# Patient Record
Sex: MALE | Race: BLACK OR AFRICAN AMERICAN | Employment: UNEMPLOYED | URBAN - METROPOLITAN AREA
[De-identification: names, ages, dates, MRNs, and addresses within clinical notes are randomized per-mention and may not be internally consistent; named-entity substitution may affect disease eponyms.]

---

## 2017-05-06 PROBLEM — S12.500A CLOSED DISPLACED FRACTURE OF SIXTH CERVICAL VERTEBRA (HCC): Status: ACTIVE | Noted: 2017-05-06

## 2017-05-06 PROBLEM — S12.301A CLOSED NONDISPLACED FRACTURE OF FOURTH CERVICAL VERTEBRA (HCC): Status: ACTIVE | Noted: 2017-05-06

## 2017-05-06 PROBLEM — S20.311A ABRASION OF RIGHT CHEST WALL: Status: ACTIVE | Noted: 2017-05-06

## 2017-05-06 PROBLEM — S27.322A CONTUSION OF LUNG, BILATERAL, INITIAL ENCOUNTER: Status: ACTIVE | Noted: 2017-05-06

## 2017-05-06 PROBLEM — S12.9XXA CERVICAL SPINE FRACTURE (HCC): Status: ACTIVE | Noted: 2017-05-06

## 2017-05-06 PROBLEM — S12.401A CLOSED NONDISPLACED FRACTURE OF FIFTH CERVICAL VERTEBRA (HCC): Status: ACTIVE | Noted: 2017-05-06

## 2017-05-06 PROBLEM — S12.101A CLOSED NONDISPLACED FRACTURE OF SECOND CERVICAL VERTEBRA (HCC): Status: ACTIVE | Noted: 2017-05-06

## 2017-05-06 PROBLEM — S22.41XA CLOSED FRACTURE OF MULTIPLE RIBS OF RIGHT SIDE: Status: ACTIVE | Noted: 2017-05-06

## 2017-05-06 PROBLEM — S00.81XA FOREHEAD ABRASION: Status: ACTIVE | Noted: 2017-05-06

## 2017-05-06 PROBLEM — I77.74 VERTEBRAL ARTERY DISSECTION (HCC): Status: ACTIVE | Noted: 2017-05-06

## 2017-05-06 PROBLEM — S70.211A ABRASION OF RIGHT HIP: Status: ACTIVE | Noted: 2017-05-06

## 2017-05-06 PROBLEM — S10.93XA HEMATOMA OF NECK: Status: ACTIVE | Noted: 2017-05-06

## 2017-05-06 PROBLEM — F19.90 ILLICIT DRUG USE: Status: ACTIVE | Noted: 2017-05-06
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
## 2017-05-06 PROBLEM — S12.201A CLOSED NONDISPLACED FRACTURE OF THIRD CERVICAL VERTEBRA (HCC): Status: ACTIVE | Noted: 2017-05-06

## 2017-05-06 PROBLEM — S12.030A: Status: ACTIVE | Noted: 2017-05-06

## 2017-05-06 PROBLEM — S80.211A ABRASION OF RIGHT KNEE: Status: ACTIVE | Noted: 2017-05-06

## 2017-05-08 PROBLEM — S06.2XAA DIFFUSE AXONAL BRAIN INJURY (HCC): Status: ACTIVE | Noted: 2017-05-08

## 2017-05-08 PROBLEM — G54.0 BRACHIAL PLEXOPATHY: Status: ACTIVE | Noted: 2017-05-08

## 2017-05-08 PROBLEM — G83.21 PARALYSIS OF RIGHT UPPER EXTREMITY (HCC): Status: ACTIVE | Noted: 2017-05-08

## 2017-05-08 PROBLEM — G83.11 PARALYSIS OF RIGHT LOWER EXTREMITY (HCC): Status: ACTIVE | Noted: 2017-05-08

## 2017-05-11 PROBLEM — I60.9 SAH (SUBARACHNOID HEMORRHAGE) (HCC): Status: ACTIVE | Noted: 2017-05-11

## 2017-05-11 PROBLEM — S06.4XAA EPIDURAL HEMATOMA (HCC): Status: ACTIVE | Noted: 2017-05-11

## 2017-05-11 PROBLEM — S14.109A CONTUSION OF CERVICAL CORD (HCC): Status: ACTIVE | Noted: 2017-05-11

## 2017-05-11 PROBLEM — S14.2XXA CERVICAL NERVE ROOT INJURY: Status: ACTIVE | Noted: 2017-05-11

## 2017-05-11 PROBLEM — T14.8XXA MUSCLE CONTUSION: Status: ACTIVE | Noted: 2017-05-11

## 2017-06-29 PROBLEM — S27.322A CONTUSION OF LUNG, BILATERAL, INITIAL ENCOUNTER: Status: RESOLVED | Noted: 2017-05-06 | Resolved: 2017-06-29

## 2017-06-29 PROBLEM — S12.201A CLOSED NONDISPLACED FRACTURE OF THIRD CERVICAL VERTEBRA (HCC): Status: RESOLVED | Noted: 2017-05-06 | Resolved: 2017-06-29

## 2017-06-29 PROBLEM — S10.93XA HEMATOMA OF NECK: Status: RESOLVED | Noted: 2017-05-06 | Resolved: 2017-06-29

## 2017-06-29 PROBLEM — S12.301A CLOSED NONDISPLACED FRACTURE OF FOURTH CERVICAL VERTEBRA (HCC): Status: RESOLVED | Noted: 2017-05-06 | Resolved: 2017-06-29

## 2017-06-29 PROBLEM — S22.41XA CLOSED FRACTURE OF MULTIPLE RIBS OF RIGHT SIDE: Status: RESOLVED | Noted: 2017-05-06 | Resolved: 2017-06-29

## 2017-06-29 PROBLEM — S12.500A CLOSED DISPLACED FRACTURE OF SIXTH CERVICAL VERTEBRA (HCC): Status: RESOLVED | Noted: 2017-05-06 | Resolved: 2017-06-29

## 2017-06-29 PROBLEM — S12.401A CLOSED NONDISPLACED FRACTURE OF FIFTH CERVICAL VERTEBRA (HCC): Status: RESOLVED | Noted: 2017-05-06 | Resolved: 2017-06-29

## 2017-07-12 ENCOUNTER — TELEPHONE (OUTPATIENT)
Dept: FAMILY MEDICINE CLINIC | Age: 37
End: 2017-07-12

## 2017-07-17 ENCOUNTER — OFFICE VISIT (OUTPATIENT)
Dept: FAMILY MEDICINE CLINIC | Age: 37
End: 2017-07-17
Payer: COMMERCIAL

## 2017-07-17 ENCOUNTER — TELEPHONE (OUTPATIENT)
Dept: FAMILY MEDICINE CLINIC | Age: 37
End: 2017-07-17

## 2017-07-17 VITALS
TEMPERATURE: 99 F | DIASTOLIC BLOOD PRESSURE: 64 MMHG | HEART RATE: 98 BPM | BODY MASS INDEX: 25.05 KG/M2 | HEIGHT: 70 IN | SYSTOLIC BLOOD PRESSURE: 118 MMHG | OXYGEN SATURATION: 98 % | WEIGHT: 175 LBS

## 2017-07-17 DIAGNOSIS — Z23 NEED FOR PROPHYLACTIC VACCINATION AGAINST STREPTOCOCCUS PNEUMONIAE (PNEUMOCOCCUS): ICD-10-CM

## 2017-07-17 DIAGNOSIS — S12.030D CLOSED DISPLACED FRACTURE OF POSTERIOR ARCH OF FIRST CERVICAL VERTEBRA WITH ROUTINE HEALING, SUBSEQUENT ENCOUNTER: ICD-10-CM

## 2017-07-17 DIAGNOSIS — Z23 NEED FOR PROPHYLACTIC VACCINATION AGAINST DIPHTHERIA-TETANUS-PERTUSSIS (DTP): ICD-10-CM

## 2017-07-17 DIAGNOSIS — I77.74 VERTEBRAL ARTERY DISSECTION (HCC): ICD-10-CM

## 2017-07-17 DIAGNOSIS — S12.101D CLOSED NONDISPLACED FRACTURE OF SECOND CERVICAL VERTEBRA WITH ROUTINE HEALING, UNSPECIFIED FRACTURE MORPHOLOGY, SUBSEQUENT ENCOUNTER: ICD-10-CM

## 2017-07-17 DIAGNOSIS — Z11.4 SCREENING FOR HIV WITHOUT PRESENCE OF RISK FACTORS: ICD-10-CM

## 2017-07-17 DIAGNOSIS — S06.2X1D: ICD-10-CM
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
## 2017-07-17 PROCEDURE — 99495 TRANSJ CARE MGMT MOD F2F 14D: CPT | Performed by: FAMILY MEDICINE

## 2017-07-17 RX ORDER — OXYCODONE HCL 10 MG/1
10 TABLET, FILM COATED, EXTENDED RELEASE ORAL EVERY 12 HOURS
Qty: 28 TABLET | Refills: 0 | Status: CANCELLED | OUTPATIENT
Start: 2017-07-17 | End: 2017-07-31

## 2017-07-17 RX ORDER — OXYCODONE HYDROCHLORIDE 10 MG/1
10 TABLET ORAL EVERY 12 HOURS PRN
Refills: 0 | Status: CANCELLED | OUTPATIENT
Start: 2017-07-17 | End: 2017-07-24

## 2017-07-17 RX ORDER — OXYCODONE HYDROCHLORIDE 10 MG/1
10 TABLET ORAL EVERY 12 HOURS PRN
Qty: 7 TABLET | Refills: 0 | Status: CANCELLED | OUTPATIENT
Start: 2017-07-17 | End: 2017-07-24

## 2017-07-17 ASSESSMENT — PATIENT HEALTH QUESTIONNAIRE - PHQ9
SUM OF ALL RESPONSES TO PHQ QUESTIONS 1-9: 1
1. LITTLE INTEREST OR PLEASURE IN DOING THINGS: 1
SUM OF ALL RESPONSES TO PHQ9 QUESTIONS 1 & 2: 1
2. FEELING DOWN, DEPRESSED OR HOPELESS: 0

## 2017-07-28 ENCOUNTER — TELEPHONE (OUTPATIENT)
Dept: FAMILY MEDICINE CLINIC | Age: 37
End: 2017-07-28
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
## 2017-08-25 ENCOUNTER — HOSPITAL ENCOUNTER (OUTPATIENT)
Dept: CT IMAGING | Age: 37
Discharge: HOME | End: 2017-08-25
Payer: COMMERCIAL

## 2017-08-25 DIAGNOSIS — S12.030S: ICD-10-CM

## 2017-08-25 DIAGNOSIS — S12.101S CLOSED NONDISPLACED FRACTURE OF SECOND CERVICAL VERTEBRA, UNSPECIFIED FRACTURE MORPHOLOGY, SEQUELA: ICD-10-CM

## 2017-08-25 PROCEDURE — 72125 CT NECK SPINE W/O DYE: CPT

## 2017-09-13 ENCOUNTER — OFFICE VISIT (OUTPATIENT)
Dept: FAMILY MEDICINE CLINIC | Age: 37
End: 2017-09-13
Payer: COMMERCIAL

## 2017-09-13 VITALS
SYSTOLIC BLOOD PRESSURE: 124 MMHG | HEIGHT: 66 IN | BODY MASS INDEX: 26.03 KG/M2 | WEIGHT: 162 LBS | DIASTOLIC BLOOD PRESSURE: 80 MMHG | OXYGEN SATURATION: 96 % | HEART RATE: 93 BPM

## 2017-09-13 DIAGNOSIS — Z00.00 ENCOUNTER FOR WELLNESS EXAMINATION: Primary | ICD-10-CM

## 2017-09-13 PROCEDURE — 90732 PPSV23 VACC 2 YRS+ SUBQ/IM: CPT | Performed by: FAMILY MEDICINE

## 2017-09-13 PROCEDURE — 99395 PREV VISIT EST AGE 18-39: CPT | Performed by: FAMILY MEDICINE

## 2017-09-13 PROCEDURE — 90688 IIV4 VACCINE SPLT 0.5 ML IM: CPT | Performed by: FAMILY MEDICINE

## 2017-09-13 PROCEDURE — 90471 IMMUNIZATION ADMIN: CPT | Performed by: FAMILY MEDICINE

## 2017-09-13 ASSESSMENT — ENCOUNTER SYMPTOMS
CHEST TIGHTNESS: 0
ABDOMINAL DISTENTION: 0
DIARRHEA: 0
ANAL BLEEDING: 0
APNEA: 0
COLOR CHANGE: 0
RECTAL PAIN: 0
ABDOMINAL PAIN: 0
SORE THROAT: 0
SINUS PRESSURE: 0
BACK PAIN: 0
CHOKING: 0
STRIDOR: 0
NAUSEA: 0
EYE DISCHARGE: 0
EYE ITCHING: 0
EYE PAIN: 0
CONSTIPATION: 0
COUGH: 0
RHINORRHEA: 0
WHEEZING: 0
BLOOD IN STOOL: 0
PHOTOPHOBIA: 0
VOMITING: 0
EYE REDNESS: 0
TROUBLE SWALLOWING: 0
FACIAL SWELLING: 0
VOICE CHANGE: 0
SHORTNESS OF BREATH: 0

## 2017-09-14 ENCOUNTER — TELEPHONE (OUTPATIENT)
Dept: FAMILY MEDICINE CLINIC | Age: 37
End: 2017-09-14

## 2017-11-21 ENCOUNTER — TELEPHONE (OUTPATIENT)
Dept: FAMILY MEDICINE CLINIC | Age: 37
End: 2017-11-21

## 2017-11-21 NOTE — TELEPHONE ENCOUNTER
Please can you find out who requested that test.  I know my name is in the order, but I did not requested that.

## 2017-11-21 NOTE — TELEPHONE ENCOUNTER
CALLED AND SPOKE WITH PATIENT. PATIENT STATES THAT  DID A NERVE TEST ON HIM AND SENT HIM TO [facility]. PATIENT STATES THAT HE WAS GOING TO HAVE TO WAIT SEVERAL HOURS TO GET TEST PERFORMED THERE SO HE CALLED [facility] TO SCHEDULE TEST. TEST WAS NEVER ORDERED BY DR. Kem Serrato, IT WAS ORDERED BY [facility]. PATIENT STATES HE DID RECEIVE ON ORDER FROM THEM. PATIENT SCHEDULED TEST HIMSELF BY CALLING [facility]. ADVISED TONO OF INFORMATION.

## 2017-11-21 NOTE — TELEPHONE ENCOUNTER
Spoke to [employer] at [facility] regarding order entered. Informed her of above information.  She verbalized understanding and said she would forward to appropriate team.

## 2017-11-21 NOTE — TELEPHONE ENCOUNTER
Central scheduling at [facility] called regarding needing an order for a MRI Brachial Plexus Right WO Contrast. Order is currently found under the Referral tab however is not the correct order. Please cancel and put in new order. Testing will need to be prior auth before appointment.

## 2017-12-13 ENCOUNTER — OFFICE VISIT (OUTPATIENT)
Dept: FAMILY MEDICINE CLINIC | Age: 37
End: 2017-12-13
Payer: COMMERCIAL

## 2017-12-13 VITALS
SYSTOLIC BLOOD PRESSURE: 116 MMHG | HEART RATE: 81 BPM | OXYGEN SATURATION: 97 % | HEIGHT: 66 IN | RESPIRATION RATE: 12 BRPM | DIASTOLIC BLOOD PRESSURE: 76 MMHG | BODY MASS INDEX: 26.52 KG/M2 | WEIGHT: 165 LBS | TEMPERATURE: 98.5 F

## 2017-12-13 DIAGNOSIS — S14.3XXS INJURY OF BRACHIAL PLEXUS, SEQUELA: Primary | ICD-10-CM

## 2017-12-13 DIAGNOSIS — Z23 NEED FOR PROPHYLACTIC VACCINATION AGAINST DIPHTHERIA-TETANUS-PERTUSSIS (DTP): ICD-10-CM

## 2017-12-13 PROCEDURE — G8417 CALC BMI ABV UP PARAM F/U: HCPCS | Performed by: FAMILY MEDICINE

## 2017-12-13 PROCEDURE — 90471 IMMUNIZATION ADMIN: CPT | Performed by: FAMILY MEDICINE

## 2017-12-13 PROCEDURE — G8484 FLU IMMUNIZE NO ADMIN: HCPCS | Performed by: FAMILY MEDICINE

## 2017-12-13 PROCEDURE — 4004F PT TOBACCO SCREEN RCVD TLK: CPT | Performed by: FAMILY MEDICINE

## 2017-12-13 PROCEDURE — 90715 TDAP VACCINE 7 YRS/> IM: CPT | Performed by: FAMILY MEDICINE

## 2017-12-13 PROCEDURE — 99212 OFFICE O/P EST SF 10 MIN: CPT | Performed by: FAMILY MEDICINE

## 2017-12-13 PROCEDURE — G8427 DOCREV CUR MEDS BY ELIG CLIN: HCPCS | Performed by: FAMILY MEDICINE

## 2017-12-13 RX ORDER — BACLOFEN 10 MG/1
10 TABLET ORAL 3 TIMES DAILY
Qty: 90 TABLET | Refills: 0 | Status: CANCELLED | OUTPATIENT
Start: 2017-12-13

## 2017-12-13 RX ORDER — HYDROCODONE BITARTRATE AND ACETAMINOPHEN 5; 325 MG/1; MG/1
1 TABLET ORAL EVERY 6 HOURS PRN
COMMUNITY
End: 2018-06-13 | Stop reason: DRUGHIGH

## 2017-12-13 NOTE — PATIENT INSTRUCTIONS
You may receive a survey about your visit with us today. The feedback from our patients helps us identify what is working well and where the service to all patients can be enhanced. Thank you! Patient Education        Tdap (Tetanus, Diphtheria, Pertussis) Vaccine: What You Need to Know  Why get vaccinated? Tetanus, diphtheria, and pertussis are very serious diseases. Tdap vaccine can protect us from these diseases. And Tdap vaccine given to pregnant women can protect  babies against pertussis. Tetanus (lockjaw) is rare in the [facility] today. It causes painful muscle tightening and stiffness, usually all over the body. · It can lead to tightening of muscles in the head and neck so you can't open your mouth, swallow, or sometimes even breathe. Tetanus kills about 1 out of 10 people who are infected even after receiving the best medical care. Diphtheria is also rare in the [place] today. It can cause a thick coating to form in the back of the throat. · It can lead to breathing problems, heart failure, paralysis, and death. Pertussis (whooping cough) causes severe coughing spells, which can cause difficulty breathing, vomiting, and disturbed sleep. · It can also lead to weight loss, incontinence, and rib fractures. Up to 2 in 100 adolescents and 5 in 100 adults with pertussis are hospitalized or have complications, which could include pneumonia or death. These diseases are caused by bacteria. Diphtheria and pertussis are spread from person to person through secretions from coughing or sneezing. Tetanus enters the body through cuts, scratches, or wounds. Before vaccines, as many as 200,000 cases of diphtheria, 200,000 cases of pertussis, and hundreds of cases of tetanus were reported in the [place] each year. Since vaccination began, reports of cases for tetanus and diphtheria have dropped by about 99% and for pertussis by about 80%.   Tdap vaccine  The Tdap vaccine can protect adolescents and adults from tetanus, diphtheria, and pertussis. One dose of Tdap is routinely given at age 6 or 15. People who did not get Tdap at that age should get it as soon as possible. Tdap is especially important for health care professionals and anyone having close contact with a baby younger than 12 months. Pregnant women should get a dose of Tdap during every pregnancy, to protect the  from pertussis. Infants are most at risk for severe, life-threatening complications from pertussis. Another vaccine, called Td, protects against tetanus and diphtheria, but not pertussis. A Td booster should be given every 10 years. Tdap may be given as one of these boosters if you have never gotten Tdap before. Tdap may also be given after a severe cut or burn to prevent tetanus infection. Your doctor or the person giving you the vaccine can give you more information. Tdap may safely be given at the same time as other vaccines. Some people should not get this vaccine  · A person who has ever had a life-threatening allergic reaction after a previous dose of any diphtheria-, tetanus-, or pertussis-containing vaccine, OR has a severe allergy to any part of this vaccine, should not get Tdap vaccine. Tell the person giving the vaccine about any severe allergies. · Anyone who had coma or long repeated seizures within 7 days after a childhood dose of DTP or DTaP, or a previous dose of Tdap, should not get Tdap, unless a cause other than the vaccine was found. They can still get Td. · Talk to your doctor if you:  ¨ Have seizures or another nervous system problem. ¨ Had severe pain or swelling after any vaccine containing diphtheria, tetanus, or pertussis. ¨ Ever had a condition called Guillain-Barré Syndrome (GBS). ¨ Aren't feeling well on the day the shot is scheduled. Risks  With any medicine, including vaccines, there is a chance of side effects. These are usually mild and go away on their own.  Serious very rare, estimated at fewer than 1 in a million doses, and would happen within a few minutes to a few hours after the vaccination. As with any medicine, there is a very remote chance of a vaccine causing a serious injury or death. The safety of vaccines is always being monitored. For more information, visit: www.cdc.gov/vaccinesafety. What if there is a serious problem? What should I look for? · Look for anything that concerns you, such as signs of a severe allergic reaction, very high fever, or unusual behavior. Signs of a severe allergic reaction can include hives, swelling of the face and throat, difficulty breathing, a fast heartbeat, dizziness, and weakness. These would usually start a few minutes to a few hours after the vaccination. What should I do? · If you think it is a severe allergic reaction or other emergency that can't wait, call 9-1-1 or get the person to the nearest hospital. Otherwise, call your doctor. · Afterward, the reaction should be reported to the Vaccine Adverse Event Reporting System (VAERS). Your doctor might file this report, or you can do it yourself through the VAERS web site at www.vaers. [facility].gov, or by calling 4-903.892.1353. [employer] does not give medical advice. The National Vaccine Injury Compensation Program  The National Vaccine Injury Compensation Program (VICP) is a federal program that was created to compensate people who may have been injured by certain vaccines. Persons who believe they may have been injured by a vaccine can learn about the program and about filing a claim by calling 1-515.847.9585 or visiting the [employer] website at www.[facility].gov/vaccinecompensation. There is a time limit to file a claim for compensation. How can I learn more? · Ask your doctor. He or she can give you the vaccine package insert or suggest other sources of information. · Call your local or state health department.   · Contact the Centers for Disease Control and Prevention ([facility]):  ¨ Call 9-886.545.3100 (1-800-CDC-INFO) or  ¨ Visit CDC's website at www.cdc.gov/vaccines  Vaccine Information Statement (Interim)  Tdap Vaccine  (2/24/15)  42 KJ Lackey [id number]  Department of Health and Human Services  Centers for Disease Control and Prevention  Many Vaccine Information Statements are available in [place] and other languages. See www.immunize.org/vis. Muchas hojas de información sobre vacunas están disponibles en español y en otros idiomas. Visite www.immunize.org/vis. Care instructions adapted under license by [facility] ([facility]). If you have questions about a medical condition or this instruction, always ask your healthcare professional. [place] [address] any warranty or liability for your use of this information. Patient Education        Stopping Smoking: Care Instructions  Your Care Instructions    Cigarette smokers crave the nicotine in cigarettes. Giving it up is much harder than simply changing a habit. Your body has to stop craving the nicotine. It is hard to quit, but you can do it. There are many tools that people use to quit smoking. You may find that combining tools works best for you. There are several steps to quitting. First you get ready to quit. Then you get support to help you. After that, you learn new skills and behaviors to become a nonsmoker. For many people, a necessary step is getting and using medicine. Your doctor will help you set up the plan that best meets your needs. You may want to attend a smoking cessation program to help you quit smoking. When you choose a program, look for one that has proven success. Ask your doctor for ideas. You will greatly increase your chances of success if you take medicine as well as get counseling or join a cessation program.  Some of the changes you feel when you first quit tobacco are uncomfortable. Your body will miss the nicotine at first, and you may feel short-tempered and grumpy.  You may have trouble sleeping or concentrating. Medicine can help you deal with these symptoms. You may struggle with changing your smoking habits and rituals. The last step is the tricky one: Be prepared for the smoking urge to continue for a time. This is a lot to deal with, but keep at it. You will feel better. Follow-up care is a key part of your treatment and safety. Be sure to make and go to all appointments, and call your doctor if you are having problems. It's also a good idea to know your test results and keep a list of the medicines you take. How can you care for yourself at home? · Ask your family, friends, and coworkers for support. You have a better chance of quitting if you have help and support. · Join a support group, such as Nicotine Anonymous, for people who are trying to quit smoking. · Consider signing up for a smoking cessation program, such as the American Lung Association's Freedom from Smoking program.  · Set a quit date. Pick your date carefully so that it is not right in the middle of a big deadline or stressful time. Once you quit, do not even take a puff. Get rid of all ashtrays and lighters after your last cigarette. Clean your house and your clothes so that they do not smell of smoke. · Learn how to be a nonsmoker. Think about ways you can avoid those things that make you reach for a cigarette. ¨ Avoid situations that put you at greatest risk for smoking. For some people, it is hard to have a drink with friends without smoking. For others, they might skip a coffee break with coworkers who smoke. ¨ Change your daily routine. Take a different route to work or eat a meal in a different place. · Cut down on stress. Calm yourself or release tension by doing an activity you enjoy, such as reading a book, taking a hot bath, or gardening. · Talk to your doctor or pharmacist about nicotine replacement therapy, which replaces the nicotine in your body.  You still get nicotine but you do not use tobacco. Nicotine replacement products help you slowly reduce the amount of nicotine you need. These products come in several forms, many of them available over-the-counter:  ¨ Nicotine patches  ¨ Nicotine gum and lozenges  ¨ Nicotine inhaler  · Ask your doctor about bupropion (Wellbutrin) or varenicline (Chantix), which are prescription medicines. They do not contain nicotine. They help you by reducing withdrawal symptoms, such as stress and anxiety. · Some people find hypnosis, acupuncture, and massage helpful for ending the smoking habit. · Eat a healthy diet and get regular exercise. Having healthy habits will help your body move past its craving for nicotine. · Be prepared to keep trying. Most people are not successful the first few times they try to quit. Do not get mad at yourself if you smoke again. Make a list of things you learned and think about when you want to try again, such as next week, next month, or next year. Where can you learn more? Go to https://Yoka.Jobbr. org and sign in to your [employer] account. Enter W676 in the iPointer box to learn more about \"Stopping Smoking: Care Instructions. \"     If you do not have an account, please click on the \"Sign Up Now\" link. Current as of: March 20, 2017  Content Version: 11.4  © 9048-1692 [employer]. Care instructions adapted under license by [facility] ([facility]). If you have questions about a medical condition or this instruction, always ask your healthcare professional. [place] [address] any warranty or liability for your use of this information. Patient Education        Learning About Benefits From Quitting Smoking  How does quitting smoking make you healthier? If you're thinking about quitting smoking, you may have a few reasons to be smoke-free. Your health may be one of them.   · When you quit smoking, you lower your risks for cancer, lung disease, heart attack, stroke, blood vessel disease, and blindness from macular degeneration. · When you're smoke-free, you get sick less often, and you heal faster. You are less likely to get colds, flu, bronchitis, and pneumonia. · As a nonsmoker, you may find that your mood is better and you are less stressed. When and how will you feel healthier? Quitting has real health benefits that start from day 1 of being smoke-free. And the longer you stay smoke-free, the healthier you get and the better you feel. The first hours  · After just 20 minutes, your blood pressure and heart rate go down. That means there's less stress on your heart and blood vessels. · Within 12 hours, the level of carbon monoxide in your blood drops back to normal. That makes room for more oxygen. With more oxygen in your body, you may notice that you have more energy than when you smoked. After 2 weeks  · Your lungs start to work better. · Your risk of heart attack starts to drop. After 1 month  · When your lungs are clear, you cough less and breathe deeper, so it's easier to be active. · Your sense of taste and smell return. That means you can enjoy food more than you have since you started smoking. Over the years  · After 1 year, your risk of heart disease is half what it would be if you kept smoking. · After 5 years, your risk of stroke starts to shrink. Within a few years after that, it's about the same as if you'd never smoked. · After 10 years, your risk of dying from lung cancer is cut by about half. And your risk for many other types of cancer is lower too. How would quitting help others in your life? When you quit smoking, you improve the health of everyone who now breathes in your smoke. · Their heart, lung, and cancer risks drop, much like yours. · They are sick less. For babies and small children, living smoke-free means they're less likely to have ear infections, pneumonia, and bronchitis.   · If you're a woman who is or will be pregnant someday, quitting smoking means a healthier . · Children who are close to you are less likely to become adult smokers. Where can you learn more? Go to https://chpepiceweb.healthBroota. org and sign in to your [employer] account. Enter [id number] [phone] in the Sphere 3d box to learn more about \"Learning About Benefits From Quitting Smoking. \"     If you do not have an account, please click on the \"Sign Up Now\" link. Current as of: 2017  Content Version: 11.4  © 7655-7531 Healthwise, Incorporated. Care instructions adapted under license by [facility] ([facility]). If you have questions about a medical condition or this instruction, always ask your healthcare professional. [address] any warranty or liability for your use of this information.

## 2017-12-20 ENCOUNTER — HOSPITAL ENCOUNTER (OUTPATIENT)
Dept: MRI IMAGING | Age: 37
Discharge: HOME | End: 2017-12-20
Payer: COMMERCIAL

## 2017-12-20 DIAGNOSIS — G54.0 BRACHIAL PLEXUS DISORDERS: ICD-10-CM

## 2017-12-20 DIAGNOSIS — S14.109A INJURY OF CERVICAL SPINAL CORD, INITIAL ENCOUNTER (HCC): ICD-10-CM

## 2017-12-20 PROCEDURE — 73218 MRI UPPER EXTREMITY W/O DYE: CPT

## 2018-06-13 ENCOUNTER — OFFICE VISIT (OUTPATIENT)
Dept: FAMILY MEDICINE CLINIC | Age: 38
End: 2018-06-13
Payer: COMMERCIAL

## 2018-06-13 VITALS
HEART RATE: 83 BPM | BODY MASS INDEX: 24.4 KG/M2 | SYSTOLIC BLOOD PRESSURE: 139 MMHG | OXYGEN SATURATION: 96 % | DIASTOLIC BLOOD PRESSURE: 79 MMHG | HEIGHT: 68 IN | RESPIRATION RATE: 10 BRPM | TEMPERATURE: 98 F | WEIGHT: 161 LBS

## 2018-06-13 DIAGNOSIS — Z93.0 TRACHEOSTOMY IN PLACE (HCC): ICD-10-CM

## 2018-06-13 DIAGNOSIS — Z13.1 DIABETES MELLITUS SCREENING: ICD-10-CM

## 2018-06-13 DIAGNOSIS — G83.21 PARALYSIS OF RIGHT UPPER EXTREMITY (HCC): ICD-10-CM

## 2018-06-13 DIAGNOSIS — S14.3XXS INJURY OF BRACHIAL PLEXUS, SEQUELA: ICD-10-CM

## 2018-06-13 DIAGNOSIS — E55.9 VITAMIN D DEFICIENCY: Primary | ICD-10-CM

## 2018-06-13 DIAGNOSIS — Z13.220 SCREENING CHOLESTEROL LEVEL: ICD-10-CM

## 2018-06-13 DIAGNOSIS — G83.11 PARALYSIS OF RIGHT LOWER EXTREMITY (HCC): ICD-10-CM

## 2018-06-13 PROCEDURE — 99213 OFFICE O/P EST LOW 20 MIN: CPT | Performed by: FAMILY MEDICINE

## 2018-06-13 PROCEDURE — G8427 DOCREV CUR MEDS BY ELIG CLIN: HCPCS | Performed by: FAMILY MEDICINE

## 2018-06-13 PROCEDURE — 4004F PT TOBACCO SCREEN RCVD TLK: CPT | Performed by: FAMILY MEDICINE

## 2018-06-13 PROCEDURE — G8420 CALC BMI NORM PARAMETERS: HCPCS | Performed by: FAMILY MEDICINE

## 2018-06-13 RX ORDER — ERGOCALCIFEROL (VITAMIN D2) 50 MCG
1 CAPSULE ORAL DAILY
Qty: 90 CAPSULE | Refills: 1 | Status: SHIPPED | OUTPATIENT
Start: 2018-06-13

## 2018-06-13 RX ORDER — HYDROCODONE BITARTRATE AND ACETAMINOPHEN 10; 325 MG/1; MG/1
TABLET ORAL
Refills: 0 | COMMUNITY
Start: 2018-05-11

## 2018-08-08 ENCOUNTER — TELEPHONE (OUTPATIENT)
Dept: FAMILY MEDICINE CLINIC | Age: 38
End: 2018-08-08

## 2023-06-08 ENCOUNTER — ANESTHESIA (OUTPATIENT)
Dept: OPERATING ROOM | Age: 43
End: 2023-06-08
Payer: COMMERCIAL

## 2023-06-08 ENCOUNTER — ANESTHESIA EVENT (OUTPATIENT)
Dept: OPERATING ROOM | Age: 43
End: 2023-06-08
Payer: COMMERCIAL

## 2023-06-08 PROBLEM — S72.141A INTERTROCHANTERIC FRACTURE OF RIGHT FEMUR, CLOSED, INITIAL ENCOUNTER (HCC): Status: ACTIVE | Noted: 2023-06-08

## 2023-06-08 PROCEDURE — 2580000003 HC RX 258: Performed by: STUDENT IN AN ORGANIZED HEALTH CARE EDUCATION/TRAINING PROGRAM

## 2023-06-08 PROCEDURE — 2500000003 HC RX 250 WO HCPCS: Performed by: STUDENT IN AN ORGANIZED HEALTH CARE EDUCATION/TRAINING PROGRAM

## 2023-06-08 PROCEDURE — 6360000002 HC RX W HCPCS: Performed by: STUDENT IN AN ORGANIZED HEALTH CARE EDUCATION/TRAINING PROGRAM

## 2023-06-08 PROCEDURE — 6360000002 HC RX W HCPCS: Performed by: ORTHOPAEDIC SURGERY

## 2023-06-08 RX ORDER — TRANEXAMIC ACID 100 MG/ML
INJECTION, SOLUTION INTRAVENOUS PRN
Status: DISCONTINUED | OUTPATIENT
Start: 2023-06-08 | End: 2023-06-08 | Stop reason: SDUPTHER

## 2023-06-08 RX ORDER — ROCURONIUM BROMIDE 10 MG/ML
INJECTION, SOLUTION INTRAVENOUS PRN
Status: DISCONTINUED | OUTPATIENT
Start: 2023-06-08 | End: 2023-06-08 | Stop reason: SDUPTHER

## 2023-06-08 RX ORDER — ONDANSETRON 2 MG/ML
INJECTION INTRAMUSCULAR; INTRAVENOUS PRN
Status: DISCONTINUED | OUTPATIENT
Start: 2023-06-08 | End: 2023-06-08 | Stop reason: SDUPTHER

## 2023-06-08 RX ORDER — PROPOFOL 10 MG/ML
INJECTION, EMULSION INTRAVENOUS PRN
Status: DISCONTINUED | OUTPATIENT
Start: 2023-06-08 | End: 2023-06-08 | Stop reason: SDUPTHER

## 2023-06-08 RX ORDER — DEXAMETHASONE SODIUM PHOSPHATE 10 MG/ML
INJECTION, EMULSION INTRAMUSCULAR; INTRAVENOUS PRN
Status: DISCONTINUED | OUTPATIENT
Start: 2023-06-08 | End: 2023-06-08 | Stop reason: SDUPTHER

## 2023-06-08 RX ORDER — SODIUM CHLORIDE 9 MG/ML
INJECTION, SOLUTION INTRAVENOUS CONTINUOUS PRN
Status: DISCONTINUED | OUTPATIENT
Start: 2023-06-08 | End: 2023-06-08 | Stop reason: SDUPTHER

## 2023-06-08 RX ORDER — LIDOCAINE HCL/PF 100 MG/5ML
SYRINGE (ML) INJECTION PRN
Status: DISCONTINUED | OUTPATIENT
Start: 2023-06-08 | End: 2023-06-08 | Stop reason: SDUPTHER

## 2023-06-08 RX ORDER — FENTANYL CITRATE 50 UG/ML
INJECTION, SOLUTION INTRAMUSCULAR; INTRAVENOUS PRN
Status: DISCONTINUED | OUTPATIENT
Start: 2023-06-08 | End: 2023-06-08 | Stop reason: SDUPTHER

## 2023-06-08 RX ADMIN — ROCURONIUM BROMIDE 10 MG: 10 INJECTION INTRAVENOUS at 08:06

## 2023-06-08 RX ADMIN — PROPOFOL 50 MG: 10 INJECTION, EMULSION INTRAVENOUS at 08:00

## 2023-06-08 RX ADMIN — TRANEXAMIC ACID 1000 MG: 100 INJECTION, SOLUTION INTRAVENOUS at 07:51

## 2023-06-08 RX ADMIN — SODIUM CHLORIDE: 9 INJECTION, SOLUTION INTRAVENOUS at 07:33

## 2023-06-08 RX ADMIN — FENTANYL CITRATE 100 MCG: 50 INJECTION, SOLUTION INTRAMUSCULAR; INTRAVENOUS at 07:28

## 2023-06-08 RX ADMIN — ROCURONIUM BROMIDE 50 MG: 10 INJECTION INTRAVENOUS at 07:37

## 2023-06-08 RX ADMIN — PROPOFOL 100 MG: 10 INJECTION, EMULSION INTRAVENOUS at 07:39

## 2023-06-08 RX ADMIN — DEXAMETHASONE SODIUM PHOSPHATE 10 MG: 10 INJECTION, EMULSION INTRAMUSCULAR; INTRAVENOUS at 07:49

## 2023-06-08 RX ADMIN — Medication 100 MG: at 07:36

## 2023-06-08 RX ADMIN — SUGAMMADEX 200 MG: 100 INJECTION, SOLUTION INTRAVENOUS at 08:21

## 2023-06-08 RX ADMIN — Medication 2000 MG: at 07:48

## 2023-06-08 RX ADMIN — ONDANSETRON 4 MG: 2 INJECTION INTRAMUSCULAR; INTRAVENOUS at 08:03

## 2023-06-08 RX ADMIN — PROPOFOL 200 MG: 10 INJECTION, EMULSION INTRAVENOUS at 07:36

## 2023-06-08 ASSESSMENT — LIFESTYLE VARIABLES: SMOKING_STATUS: 1
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
## 2023-06-08 NOTE — ANESTHESIA POSTPROCEDURE EVALUATION
Department of Anesthesiology  Postprocedure Note    Patient: Rina Corona  MRN: [id number]  YOB: 1980  Date of evaluation: 6/8/2023      Procedure Summary     Date: 06/08/23 Room / Location: [facility] Tavia Agueroferhussein Durbin    Anesthesia Start: 9753 Anesthesia Stop: Lyda Sayres    Procedure: Right Hip Metatan (Right: Hip) Diagnosis:       Closed displaced intertrochanteric fracture of right femur, initial encounter ([facility]      (Closed displaced intertrochanteric fracture of right femur, initial encounter ([facility] [V13.068T])    Surgeons: Lyn Collins MD Responsible Provider: Nita Bah DO    Anesthesia Type: general ASA Status: 2          Anesthesia Type: No value filed.     Angeli Phase I: Angeli Score: 8    Angeli Phase II:        Anesthesia Post Evaluation    Patient location during evaluation: PACU  Patient participation: complete - patient participated  Level of consciousness: awake and alert  Airway patency: patent  Nausea & Vomiting: no vomiting and no nausea  Complications: no  Cardiovascular status: hemodynamically stable  Respiratory status: acceptable  Hydration status: stable

## 2023-06-15 ENCOUNTER — APPOINTMENT (OUTPATIENT)
Dept: GENERAL RADIOLOGY | Age: 43
End: 2023-06-15
Payer: COMMERCIAL

## 2023-06-15 ENCOUNTER — HOSPITAL ENCOUNTER (EMERGENCY)
Age: 43
Discharge: HOME | End: 2023-06-15
Attending: STUDENT IN AN ORGANIZED HEALTH CARE EDUCATION/TRAINING PROGRAM
Payer: COMMERCIAL

## 2023-06-15 VITALS
BODY MASS INDEX: 24.16 KG/M2 | DIASTOLIC BLOOD PRESSURE: 89 MMHG | WEIGHT: 145 LBS | SYSTOLIC BLOOD PRESSURE: 136 MMHG | TEMPERATURE: 98.1 F | OXYGEN SATURATION: 98 % | HEIGHT: 65 IN | HEART RATE: 104 BPM | RESPIRATION RATE: 20 BRPM

## 2023-06-15 DIAGNOSIS — M96.89 POSTOPERATIVE SURGICAL COMPLICATION INVOLVING MUSCULOSKELETAL SYSTEM ASSOCIATED WITH MUSCULOSKELETAL PROCEDURE, UNSPECIFIED COMPLICATION: Primary | ICD-10-CM

## 2023-06-15 PROCEDURE — 6370000000 HC RX 637 (ALT 250 FOR IP): Performed by: STUDENT IN AN ORGANIZED HEALTH CARE EDUCATION/TRAINING PROGRAM

## 2023-06-15 PROCEDURE — 99283 EMERGENCY DEPT VISIT LOW MDM: CPT

## 2023-06-15 PROCEDURE — 73502 X-RAY EXAM HIP UNI 2-3 VIEWS: CPT

## 2023-06-15 RX ORDER — SULFAMETHOXAZOLE AND TRIMETHOPRIM 800; 160 MG/1; MG/1
1 TABLET ORAL 2 TIMES DAILY
Qty: 14 TABLET | Refills: 0 | Status: SHIPPED | OUTPATIENT
Start: 2023-06-15 | End: 2023-06-22

## 2023-06-15 RX ORDER — NAPROXEN 500 MG/1
500 TABLET ORAL 2 TIMES DAILY PRN
Qty: 14 TABLET | Refills: 0 | Status: SHIPPED | OUTPATIENT
Start: 2023-06-15 | End: 2023-06-22

## 2023-06-15 RX ORDER — OXYCODONE HYDROCHLORIDE 5 MG/1
5 TABLET ORAL ONCE
Status: COMPLETED | OUTPATIENT
Start: 2023-06-15 | End: 2023-06-15

## 2023-06-15 RX ADMIN — OXYCODONE 5 MG: 5 TABLET ORAL at 15:34

## 2023-06-15 ASSESSMENT — PAIN SCALES - GENERAL: PAINLEVEL_OUTOF10: 7

## 2023-06-15 NOTE — DISCHARGE INSTRUCTIONS
Take the full course of antibiotics  You can use Tylenol as needed for pain  You can use your oxycodone as needed for pain  You can use naproxen as needed for pain, do not use with ibuprofen or other NSAIDs  Call and follow-up with Dr. Keri Aponte regarding your leg. If they cannot get you in early next week, you can go to the walk in clinic at [facility] (orthopedics).   Return to the ED for symptoms worsen or you feel you need to be reevaluated
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
## 2023-10-09 ENCOUNTER — APPOINTMENT (OUTPATIENT)
Dept: CT IMAGING | Age: 43
DRG: 710 | End: 2023-10-09
Payer: COMMERCIAL

## 2023-10-09 ENCOUNTER — APPOINTMENT (OUTPATIENT)
Dept: GENERAL RADIOLOGY | Age: 43
DRG: 710 | End: 2023-10-09
Payer: COMMERCIAL

## 2023-10-09 ENCOUNTER — HOSPITAL ENCOUNTER (INPATIENT)
Age: 43
LOS: 15 days | Discharge: NURSING, REHAB OR LONG-TERM CARE | DRG: 710 | End: 2023-10-24
Attending: EMERGENCY MEDICINE | Admitting: INTERNAL MEDICINE
Payer: COMMERCIAL

## 2023-10-09 DIAGNOSIS — I61.9 CEREBRAL BRAIN HEMORRHAGE (HCC): ICD-10-CM

## 2023-10-09 DIAGNOSIS — L02.91 ABSCESS: ICD-10-CM

## 2023-10-09 DIAGNOSIS — I61.9 BRAIN BLEED (HCC): Primary | ICD-10-CM

## 2023-10-09 LAB
ALBUMIN SERPL BCG-MCNC: 3.5 G/DL (ref 3.5–5.1)
ALP SERPL-CCNC: 106 U/L (ref 38–126)
ALT SERPL W/O P-5'-P-CCNC: 36 U/L (ref 11–66)
AMMONIA PLAS-MCNC: 25 UMOL/L (ref 11–60)
AMPHETAMINES UR QL SCN: NEGATIVE
ANION GAP SERPL CALC-SCNC: 21 MEQ/L (ref 8–16)
ANION GAP SERPL CALC-SCNC: 22 MEQ/L (ref 8–16)
APAP SERPL-MCNC: < 5 UG/ML (ref 0–20)
APTT PPP: 37.2 SECONDS (ref 22–38)
ARTERIAL PATENCY WRIST A: POSITIVE
AST SERPL-CCNC: 91 U/L (ref 5–40)
BACTERIA: ABNORMAL
BARBITURATES UR QL SCN: NEGATIVE
BASE EXCESS BLDA CALC-SCNC: -0.8 MMOL/L (ref -2.5–2.5)
BDY SITE: ABNORMAL
BENZODIAZ UR QL SCN: NEGATIVE
BILIRUB SERPL-MCNC: 0.8 MG/DL (ref 0.3–1.2)
BILIRUB UR QL STRIP: NEGATIVE
BUN SERPL-MCNC: 58 MG/DL (ref 7–22)
BUN SERPL-MCNC: 63 MG/DL (ref 7–22)
BZE UR QL SCN: NEGATIVE
CA-I BLD ISE-SCNC: 0.99 MMOL/L (ref 1.12–1.32)
CALCIUM SERPL-MCNC: 8.6 MG/DL (ref 8.5–10.5)
CALCIUM SERPL-MCNC: 9.5 MG/DL (ref 8.5–10.5)
CANNABINOIDS UR QL SCN: POSITIVE
CASTS #/AREA URNS LPF: ABNORMAL /LPF
CASTS #/AREA URNS LPF: ABNORMAL /LPF
CHARACTER UR: ABNORMAL
CHARCOAL URNS QL MICRO: ABNORMAL
CHLORIDE SERPL-SCNC: 83 MEQ/L (ref 98–111)
CHLORIDE SERPL-SCNC: 88 MEQ/L (ref 98–111)
CK SERPL-CCNC: 835 U/L (ref 55–170)
CO2 SERPL-SCNC: 18 MEQ/L (ref 23–33)
CO2 SERPL-SCNC: 22 MEQ/L (ref 23–33)
COLLECTED BY:: ABNORMAL
COLOR UR: YELLOW
CREAT SERPL-MCNC: 1.5 MG/DL (ref 0.4–1.2)
CREAT SERPL-MCNC: 1.8 MG/DL (ref 0.4–1.2)
CREAT UR-MCNC: 89.2 MG/DL
CRYSTALS URNS QL MICRO: ABNORMAL
DEVICE: ABNORMAL
EKG ATRIAL RATE: 155 BPM
EKG P AXIS: 79 DEGREES
EKG P-R INTERVAL: 136 MS
EKG Q-T INTERVAL: 322 MS
EKG QRS DURATION: 80 MS
EKG QTC CALCULATION (BAZETT): 517 MS
EKG R AXIS: 78 DEGREES
EKG T AXIS: 67 DEGREES
EKG VENTRICULAR RATE: 155 BPM
EPITHELIAL CELLS, UA: ABNORMAL /HPF
ETHANOL SERPL-MCNC: < 0.01 %
FENTANYL: NEGATIVE
GFR SERPL CREATININE-BSD FRML MDRD: 48 ML/MIN/1.73M2
GFR SERPL CREATININE-BSD FRML MDRD: 59 ML/MIN/1.73M2
GLUCOSE BLD STRIP.AUTO-MCNC: 118 MG/DL (ref 70–108)
GLUCOSE BLD-MCNC: 134 MG/DL (ref 70–108)
GLUCOSE SERPL-MCNC: 126 MG/DL (ref 70–108)
GLUCOSE SERPL-MCNC: 152 MG/DL (ref 70–108)
GLUCOSE UR QL STRIP.AUTO: NEGATIVE MG/DL
HCO3 BLDA-SCNC: 20 MMOL/L (ref 23–28)
HCT VFR BLD AUTO: 35.9 % (ref 42–52)
HGB BLD-MCNC: 12.2 GM/DL (ref 14–18)
HGB UR QL STRIP.AUTO: ABNORMAL
INR PPP: 1.01 (ref 0.85–1.13)
KETONES UR QL STRIP.AUTO: NEGATIVE
LACTATE SERPL-SCNC: 3.8 MMOL/L (ref 0.5–2)
LACTATE SERPL-SCNC: 4.1 MMOL/L (ref 0.5–2)
LACTIC ACID, SEPSIS: 4.5 MMOL/L (ref 0.5–1.9)
LACTIC ACID, SEPSIS: 4.9 MMOL/L (ref 0.5–1.9)
LEUKOCYTE ESTERASE UR QL STRIP.AUTO: NEGATIVE
MAGNESIUM SERPL-MCNC: 2 MG/DL (ref 1.6–2.4)
NITRITE UR QL STRIP.AUTO: NEGATIVE
OPIATES UR QL SCN: NEGATIVE
OSMOLALITY SERPL CALC.SUM OF ELEC: 272.9 MOSMOL/KG (ref 275–300)
OSMOLALITY SERPL CALC.SUM OF ELEC: 276.2 MOSMOL/KG (ref 275–300)
OXYCODONE: NEGATIVE
PCO2 BLDA: 23 MMHG (ref 35–45)
PCP UR QL SCN: NEGATIVE
PH BLDA: 7.55 [PH] (ref 7.35–7.45)
PH UR STRIP.AUTO: 5.5 [PH] (ref 5–9)
PO2 BLDA: 88 MMHG (ref 71–104)
POTASSIUM SERPL-SCNC: 4.2 MEQ/L (ref 3.5–5.2)
POTASSIUM SERPL-SCNC: 4.3 MEQ/L (ref 3.5–5.2)
PROT SERPL-MCNC: 7.9 G/DL (ref 6.1–8)
PROT UR STRIP.AUTO-MCNC: 100 MG/DL
RBC #/AREA URNS HPF: ABNORMAL /HPF
RENAL EPI CELLS #/AREA URNS HPF: ABNORMAL /[HPF]
SALICYLATES SERPL-MCNC: < 0.3 MG/DL (ref 2–10)
SAO2 % BLDA: 98 %
SCAN OF BLOOD SMEAR: NORMAL
SODIUM SERPL-SCNC: 127 MEQ/L (ref 135–145)
SODIUM SERPL-SCNC: 127 MEQ/L (ref 135–145)
SODIUM UR-SCNC: < 20 MEQ/L
SPECIFIC GRAVITY UA: > 1.03 (ref 1–1.03)
T4 FREE SERPL-MCNC: 0.57 NG/DL (ref 0.93–1.76)
TROPONIN, HIGH SENSITIVITY: 37 NG/L (ref 0–12)
TROPONIN, HIGH SENSITIVITY: 41 NG/L (ref 0–12)
TSH SERPL DL<=0.005 MIU/L-ACNC: 0.86 UIU/ML (ref 0.4–4.2)
UROBILINOGEN, URINE: 0.2 EU/DL (ref 0–1)
WBC #/AREA URNS HPF: ABNORMAL /HPF
YEAST LIKE FUNGI URNS QL MICRO: ABNORMAL

## 2023-10-09 PROCEDURE — 70450 CT HEAD/BRAIN W/O DYE: CPT

## 2023-10-09 PROCEDURE — 87641 MR-STAPH DNA AMP PROBE: CPT

## 2023-10-09 PROCEDURE — 99254 IP/OBS CNSLTJ NEW/EST MOD 60: CPT | Performed by: NEUROLOGICAL SURGERY

## 2023-10-09 PROCEDURE — 80179 DRUG ASSAY SALICYLATE: CPT

## 2023-10-09 PROCEDURE — 81001 URINALYSIS AUTO W/SCOPE: CPT

## 2023-10-09 PROCEDURE — 87040 BLOOD CULTURE FOR BACTERIA: CPT

## 2023-10-09 PROCEDURE — 71045 X-RAY EXAM CHEST 1 VIEW: CPT

## 2023-10-09 PROCEDURE — 87086 URINE CULTURE/COLONY COUNT: CPT

## 2023-10-09 PROCEDURE — 87070 CULTURE OTHR SPECIMN AEROBIC: CPT

## 2023-10-09 PROCEDURE — 6360000002 HC RX W HCPCS: Performed by: EMERGENCY MEDICINE

## 2023-10-09 PROCEDURE — 80053 COMPREHEN METABOLIC PANEL: CPT

## 2023-10-09 PROCEDURE — 82570 ASSAY OF URINE CREATININE: CPT

## 2023-10-09 PROCEDURE — 82948 REAGENT STRIP/BLOOD GLUCOSE: CPT

## 2023-10-09 PROCEDURE — 83735 ASSAY OF MAGNESIUM: CPT

## 2023-10-09 PROCEDURE — 82140 ASSAY OF AMMONIA: CPT

## 2023-10-09 PROCEDURE — 84439 ASSAY OF FREE THYROXINE: CPT

## 2023-10-09 PROCEDURE — 6370000000 HC RX 637 (ALT 250 FOR IP): Performed by: EMERGENCY MEDICINE

## 2023-10-09 PROCEDURE — 2580000003 HC RX 258: Performed by: EMERGENCY MEDICINE

## 2023-10-09 PROCEDURE — 84300 ASSAY OF URINE SODIUM: CPT

## 2023-10-09 PROCEDURE — 99285 EMERGENCY DEPT VISIT HI MDM: CPT

## 2023-10-09 PROCEDURE — 82803 BLOOD GASES ANY COMBINATION: CPT

## 2023-10-09 PROCEDURE — 85610 PROTHROMBIN TIME: CPT

## 2023-10-09 PROCEDURE — 36600 WITHDRAWAL OF ARTERIAL BLOOD: CPT

## 2023-10-09 PROCEDURE — 6360000004 HC RX CONTRAST MEDICATION: Performed by: EMERGENCY MEDICINE

## 2023-10-09 PROCEDURE — 2000000000 HC ICU R&B

## 2023-10-09 PROCEDURE — 80143 DRUG ASSAY ACETAMINOPHEN: CPT

## 2023-10-09 PROCEDURE — 2500000003 HC RX 250 WO HCPCS: Performed by: EMERGENCY MEDICINE

## 2023-10-09 PROCEDURE — 96376 TX/PRO/DX INJ SAME DRUG ADON: CPT

## 2023-10-09 PROCEDURE — 96375 TX/PRO/DX INJ NEW DRUG ADDON: CPT

## 2023-10-09 PROCEDURE — 94761 N-INVAS EAR/PLS OXIMETRY MLT: CPT

## 2023-10-09 PROCEDURE — 70496 CT ANGIOGRAPHY HEAD: CPT

## 2023-10-09 PROCEDURE — 36415 COLL VENOUS BLD VENIPUNCTURE: CPT

## 2023-10-09 PROCEDURE — 6360000002 HC RX W HCPCS: Performed by: NURSE PRACTITIONER

## 2023-10-09 PROCEDURE — 93010 ELECTROCARDIOGRAM REPORT: CPT | Performed by: INTERNAL MEDICINE

## 2023-10-09 PROCEDURE — 82330 ASSAY OF CALCIUM: CPT

## 2023-10-09 PROCEDURE — 6360000002 HC RX W HCPCS

## 2023-10-09 PROCEDURE — 84484 ASSAY OF TROPONIN QUANT: CPT

## 2023-10-09 PROCEDURE — 96365 THER/PROPH/DIAG IV INF INIT: CPT

## 2023-10-09 PROCEDURE — 96361 HYDRATE IV INFUSION ADD-ON: CPT

## 2023-10-09 PROCEDURE — 80307 DRUG TEST PRSMV CHEM ANLYZR: CPT

## 2023-10-09 PROCEDURE — 85018 HEMOGLOBIN: CPT

## 2023-10-09 PROCEDURE — 94660 CPAP INITIATION&MGMT: CPT

## 2023-10-09 PROCEDURE — 85025 COMPLETE CBC W/AUTO DIFF WBC: CPT

## 2023-10-09 PROCEDURE — 85014 HEMATOCRIT: CPT

## 2023-10-09 PROCEDURE — 82077 ASSAY SPEC XCP UR&BREATH IA: CPT

## 2023-10-09 PROCEDURE — 82550 ASSAY OF CK (CPK): CPT

## 2023-10-09 PROCEDURE — 2700000000 HC OXYGEN THERAPY PER DAY

## 2023-10-09 PROCEDURE — 83605 ASSAY OF LACTIC ACID: CPT

## 2023-10-09 PROCEDURE — 93005 ELECTROCARDIOGRAM TRACING: CPT

## 2023-10-09 PROCEDURE — 82947 ASSAY GLUCOSE BLOOD QUANT: CPT

## 2023-10-09 PROCEDURE — 85730 THROMBOPLASTIN TIME PARTIAL: CPT

## 2023-10-09 PROCEDURE — 2580000003 HC RX 258: Performed by: NURSE PRACTITIONER

## 2023-10-09 PROCEDURE — 84443 ASSAY THYROID STIM HORMONE: CPT

## 2023-10-09 PROCEDURE — 71275 CT ANGIOGRAPHY CHEST: CPT

## 2023-10-09 RX ORDER — ACETAMINOPHEN 325 MG/1
650 TABLET ORAL EVERY 6 HOURS PRN
Status: DISCONTINUED | OUTPATIENT
Start: 2023-10-09 | End: 2023-10-11

## 2023-10-09 RX ORDER — LORAZEPAM 2 MG/ML
INJECTION INTRAMUSCULAR
Status: COMPLETED
Start: 2023-10-09 | End: 2023-10-09

## 2023-10-09 RX ORDER — ONDANSETRON 4 MG/1
4 TABLET, ORALLY DISINTEGRATING ORAL EVERY 8 HOURS PRN
Status: DISCONTINUED | OUTPATIENT
Start: 2023-10-09 | End: 2023-10-24 | Stop reason: HOSPADM

## 2023-10-09 RX ORDER — LORAZEPAM 2 MG/ML
2 INJECTION INTRAMUSCULAR ONCE
Status: COMPLETED | OUTPATIENT
Start: 2023-10-09 | End: 2023-10-09

## 2023-10-09 RX ORDER — PHENOBARBITAL SODIUM 65 MG/ML
65 INJECTION INTRAMUSCULAR 2 TIMES DAILY
Status: COMPLETED | OUTPATIENT
Start: 2023-10-10 | End: 2023-10-11

## 2023-10-09 RX ORDER — SODIUM CHLORIDE 9 MG/ML
INJECTION, SOLUTION INTRAVENOUS PRN
Status: DISCONTINUED | OUTPATIENT
Start: 2023-10-09 | End: 2023-10-14 | Stop reason: SDUPTHER

## 2023-10-09 RX ORDER — LORAZEPAM 2 MG/ML
1 INJECTION INTRAMUSCULAR ONCE
Status: COMPLETED | OUTPATIENT
Start: 2023-10-09 | End: 2023-10-09

## 2023-10-09 RX ORDER — SODIUM CHLORIDE 9 MG/ML
INJECTION, SOLUTION INTRAVENOUS PRN
Status: DISCONTINUED | OUTPATIENT
Start: 2023-10-09 | End: 2023-10-24 | Stop reason: HOSPADM

## 2023-10-09 RX ORDER — SODIUM CHLORIDE 0.9 % (FLUSH) 0.9 %
5-40 SYRINGE (ML) INJECTION PRN
Status: DISCONTINUED | OUTPATIENT
Start: 2023-10-09 | End: 2023-10-24 | Stop reason: HOSPADM

## 2023-10-09 RX ORDER — SODIUM CHLORIDE 9 MG/ML
INJECTION, SOLUTION INTRAVENOUS CONTINUOUS
Status: DISCONTINUED | OUTPATIENT
Start: 2023-10-09 | End: 2023-10-20

## 2023-10-09 RX ORDER — POLYETHYLENE GLYCOL 3350 17 G/17G
17 POWDER, FOR SOLUTION ORAL DAILY PRN
Status: DISCONTINUED | OUTPATIENT
Start: 2023-10-09 | End: 2023-10-24 | Stop reason: HOSPADM

## 2023-10-09 RX ORDER — SODIUM CHLORIDE 0.9 % (FLUSH) 0.9 %
5-40 SYRINGE (ML) INJECTION EVERY 12 HOURS SCHEDULED
Status: DISCONTINUED | OUTPATIENT
Start: 2023-10-09 | End: 2023-10-12 | Stop reason: SDUPTHER

## 2023-10-09 RX ORDER — MULTIVITAMIN WITH IRON
1 TABLET ORAL DAILY
Status: DISCONTINUED | OUTPATIENT
Start: 2023-10-09 | End: 2023-10-24 | Stop reason: HOSPADM

## 2023-10-09 RX ORDER — THIAMINE HYDROCHLORIDE 100 MG/ML
100 INJECTION, SOLUTION INTRAMUSCULAR; INTRAVENOUS DAILY
Status: DISCONTINUED | OUTPATIENT
Start: 2023-10-09 | End: 2023-10-19

## 2023-10-09 RX ORDER — LABETALOL HYDROCHLORIDE 5 MG/ML
10 INJECTION INTRAVENOUS ONCE
Status: COMPLETED | OUTPATIENT
Start: 2023-10-09 | End: 2023-10-09

## 2023-10-09 RX ORDER — PHENOBARBITAL SODIUM 65 MG/ML
130 INJECTION INTRAMUSCULAR
Status: COMPLETED | OUTPATIENT
Start: 2023-10-09 | End: 2023-10-12

## 2023-10-09 RX ORDER — ONDANSETRON 2 MG/ML
4 INJECTION INTRAMUSCULAR; INTRAVENOUS EVERY 6 HOURS PRN
Status: DISCONTINUED | OUTPATIENT
Start: 2023-10-09 | End: 2023-10-24 | Stop reason: HOSPADM

## 2023-10-09 RX ORDER — ACETAMINOPHEN 650 MG/1
650 SUPPOSITORY RECTAL EVERY 6 HOURS PRN
Status: DISCONTINUED | OUTPATIENT
Start: 2023-10-09 | End: 2023-10-11

## 2023-10-09 RX ORDER — LEVOTHYROXINE SODIUM 0.1 MG/1
100 TABLET ORAL DAILY
Status: CANCELLED | OUTPATIENT
Start: 2023-10-09

## 2023-10-09 RX ORDER — 0.9 % SODIUM CHLORIDE 0.9 %
1000 INTRAVENOUS SOLUTION INTRAVENOUS ONCE
Status: COMPLETED | OUTPATIENT
Start: 2023-10-09 | End: 2023-10-09

## 2023-10-09 RX ORDER — SODIUM CHLORIDE 0.9 % (FLUSH) 0.9 %
5-40 SYRINGE (ML) INJECTION EVERY 12 HOURS SCHEDULED
Status: DISCONTINUED | OUTPATIENT
Start: 2023-10-09 | End: 2023-10-24 | Stop reason: HOSPADM

## 2023-10-09 RX ORDER — SODIUM CHLORIDE 0.9 % (FLUSH) 0.9 %
5-40 SYRINGE (ML) INJECTION PRN
Status: DISCONTINUED | OUTPATIENT
Start: 2023-10-09 | End: 2023-10-12 | Stop reason: SDUPTHER

## 2023-10-09 RX ORDER — SODIUM CHLORIDE 9 MG/ML
50 INJECTION, SOLUTION INTRAVENOUS ONCE
Status: DISCONTINUED | OUTPATIENT
Start: 2023-10-09 | End: 2023-10-09 | Stop reason: ALTCHOICE

## 2023-10-09 RX ORDER — DEXMEDETOMIDINE HYDROCHLORIDE 4 UG/ML
.1-1.5 INJECTION, SOLUTION INTRAVENOUS CONTINUOUS
Status: DISCONTINUED | OUTPATIENT
Start: 2023-10-09 | End: 2023-10-11

## 2023-10-09 RX ADMIN — LEVETIRACETAM 1500 MG: 100 INJECTION, SOLUTION INTRAVENOUS at 22:57

## 2023-10-09 RX ADMIN — PHENOBARBITAL SODIUM 130 MG: 65 INJECTION INTRAMUSCULAR at 20:52

## 2023-10-09 RX ADMIN — SODIUM CHLORIDE: 9 INJECTION, SOLUTION INTRAVENOUS at 20:02

## 2023-10-09 RX ADMIN — SODIUM CHLORIDE 1000 ML: 9 INJECTION, SOLUTION INTRAVENOUS at 21:14

## 2023-10-09 RX ADMIN — ACETAMINOPHEN 650 MG: 650 SUPPOSITORY RECTAL at 21:38

## 2023-10-09 RX ADMIN — CALCIUM GLUCONATE 4000 MG: 98 INJECTION, SOLUTION INTRAVENOUS at 20:07

## 2023-10-09 RX ADMIN — Medication 0.2 MCG/KG/HR: at 22:58

## 2023-10-09 RX ADMIN — LORAZEPAM 1 MG: 2 INJECTION INTRAMUSCULAR; INTRAVENOUS at 15:35

## 2023-10-09 RX ADMIN — SODIUM CHLORIDE: 9 INJECTION, SOLUTION INTRAVENOUS at 22:27

## 2023-10-09 RX ADMIN — THIAMINE HYDROCHLORIDE 100 MG: 100 INJECTION, SOLUTION INTRAMUSCULAR; INTRAVENOUS at 22:47

## 2023-10-09 RX ADMIN — LORAZEPAM 2 MG: 2 INJECTION, SOLUTION INTRAMUSCULAR; INTRAVENOUS at 21:26

## 2023-10-09 RX ADMIN — IOPAMIDOL 80 ML: 755 INJECTION, SOLUTION INTRAVENOUS at 15:43

## 2023-10-09 RX ADMIN — LORAZEPAM 2 MG: 2 INJECTION INTRAMUSCULAR at 21:26

## 2023-10-09 RX ADMIN — LABETALOL HYDROCHLORIDE 10 MG: 5 INJECTION INTRAVENOUS at 18:37

## 2023-10-09 RX ADMIN — SODIUM CHLORIDE 1000 ML: 9 INJECTION, SOLUTION INTRAVENOUS at 15:34

## 2023-10-09 RX ADMIN — SODIUM CHLORIDE 5 MG/HR: 9 INJECTION, SOLUTION INTRAVENOUS at 17:13

## 2023-10-09 RX ADMIN — LORAZEPAM 1 MG: 2 INJECTION INTRAMUSCULAR; INTRAVENOUS at 17:17

## 2023-10-09 ASSESSMENT — ENCOUNTER SYMPTOMS
COUGH: 0
NAUSEA: 0
VOMITING: 0
DIARRHEA: 0
ABDOMINAL PAIN: 0

## 2023-10-09 ASSESSMENT — PAIN - FUNCTIONAL ASSESSMENT: PAIN_FUNCTIONAL_ASSESSMENT: NONE - DENIES PAIN

## 2023-10-09 NOTE — FLOWSHEET NOTE
Pt admit to ICU report given to Micah Cheung RN. Writer is scribe. Pt attached to ICU monitor and HR in 160s. Cardene infusion at 5 mg/Hr through left forearm 20 gauge IV. Pt also with a right hand 20 gauge INT. Sheri Burkett CNP called to bedside per Micah Cheung for tachycardia. Pt difficult to understand with garbled speech verses very dry mucous membranes. pt following some commands.

## 2023-10-09 NOTE — ED NOTES
Medication administered per MAR. Patient resting in bed. Respirations easy and unlabored. No distress noted. Call light within reach.      Carmenza Chandler RN  10/09/23 6010

## 2023-10-09 NOTE — H&P
CRITICAL CARE PROGRESS NOTE      Patient:  Roxanne Garsia    Unit/Bed:05/005A  YOB: 1980  MRN: [id number]   PCP: No primary care provider on file. Date of Admission: 10/9/2023  Chief Complaint:- AMS    Assessment and Plan:    Left basal ganglia hematoma:  Seen on [facility] 10/09, 2 x 1.3 x 1.6 cm. Dr Cari Rebolledo, neurosurgery, consulted in the ED. Unclear etiology, underlying cause includes but is not limited to uncontrolled blood pressure, trauma, aneurysm. CTA Head 10/09 negative for aneurysm. No visible trauma on Pt, no witnessed falls. Pt drinks 1 bottle of liquor a day, he is a high fall risk. I have spoken with the neurosurgeon after the repeat [facility]. He suspects this hematoma likely occurred on Saturday when the Pt's mental status changed, he states that it is stable. Repeat CTH 6 hours after initial. Repeat CTH 24 hours after 2nd CT. Neurochecks q1hr  Neurosurgery following  SBP goal 100-160 mmHg  Head of bed to 30 degrees  Maintain PaCO2 35-45  EtOH withdrawal:  Drinks 1 bottle of vodka or tequila a day. ED EtOH level <0.01. Started on Ativan in the ED. Start Phenobarbital.   Multivitamin  NS @ 100 cc/hr  Daily Thiamine  Respiratory alkalosis with metabolic compensation: pH 7.55, pCO2 23, pO2 88, HCO3 20. Pt is tachypneic, possibly from intracranial hematoma or alcohol withdrawal. Pt is agitated, we will start precedex hoping that it will relax the Pt so he doesn't suffer respiratory fatigue necessitating intubation. Tachycardia, improving:  HR around 160 upon arrival to the ICU, appears sinus. Will bolus 2L IVF. He received 1L IVF in the ED, has very dry mucous membranes. 10 mg Labetalol IV  Management as above  On telemetry  Repeat EKG  Sepsis, suspected: Initially afebrile. Jenaro a temperature of 103.3 a couple hours after arriving to ICU. UA negative for UTI. CTA chest significant for minimal atelectasis/pneumonia.  Pt is tachycardic and tachypneic, initial WBC normal. Pt is coughing
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
## 2023-10-09 NOTE — ED TRIAGE NOTES
Patient to ED with friends c/o AMS. Friends at bedside states patient has been \"in and out\" for a couple days. States patient has been talking to things/people that are not there. Patient denies hallucinations. Reports patient has been having hiccups for about 5-6 days. Patient denies pain.

## 2023-10-10 ENCOUNTER — APPOINTMENT (OUTPATIENT)
Dept: GENERAL RADIOLOGY | Age: 43
DRG: 710 | End: 2023-10-10
Payer: COMMERCIAL

## 2023-10-10 LAB
ACINETOBACTER CALCOACETICUS-BAUMANNII BY PCR: NOT DETECTED
ANION GAP SERPL CALC-SCNC: 15 MEQ/L (ref 8–16)
ANION GAP SERPL CALC-SCNC: 15 MEQ/L (ref 8–16)
ARTERIAL PATENCY WRIST A: POSITIVE
BASE EXCESS BLDA CALC-SCNC: -2.5 MMOL/L (ref -2.5–2.5)
BASOPHILS ABSOLUTE: 0 THOU/MM3 (ref 0–0.1)
BASOPHILS NFR BLD AUTO: 0.2 %
BDY SITE: ABNORMAL
BLACTX-M ISLT/SPM QL: ABNORMAL
BLAIMP ISLT/SPM QL: ABNORMAL
BLAKPC ISLT/SPM QL: ABNORMAL
BLAOXA-48-LIKE ISLT/SPM QL: ABNORMAL
BLAVIM ISLT/SPM QL: ABNORMAL
BREATHS SETTING VENT: 16 BPM
BUN SERPL-MCNC: 41 MG/DL (ref 7–22)
BUN SERPL-MCNC: 58 MG/DL (ref 7–22)
C PNEUM DNA LOWER RESP QL NAA+NON-PROBE: NOT DETECTED
CA-I BLD ISE-SCNC: 1.21 MMOL/L (ref 1.12–1.32)
CALCIUM SERPL-MCNC: 8.4 MG/DL (ref 8.5–10.5)
CALCIUM SERPL-MCNC: 8.7 MG/DL (ref 8.5–10.5)
CHLORIDE SERPL-SCNC: 102 MEQ/L (ref 98–111)
CHLORIDE SERPL-SCNC: 97 MEQ/L (ref 98–111)
CO2 SERPL-SCNC: 18 MEQ/L (ref 23–33)
CO2 SERPL-SCNC: 21 MEQ/L (ref 23–33)
COLLECTED BY:: ABNORMAL
CREAT SERPL-MCNC: 1 MG/DL (ref 0.4–1.2)
CREAT SERPL-MCNC: 1.4 MG/DL (ref 0.4–1.2)
DEPRECATED RDW RBC AUTO: 55.1 FL (ref 35–45)
DEPRECATED RDW RBC AUTO: 57.9 FL (ref 35–45)
DEPRECATED RDW RBC AUTO: 59.3 FL (ref 35–45)
DEVICE: ABNORMAL
ENTEROBACTER CLOACAE COMPLEX BY PCR: NOT DETECTED
EOSINOPHIL NFR BLD AUTO: 0 %
EOSINOPHILS ABSOLUTE: 0 THOU/MM3 (ref 0–0.4)
ERYTHROCYTE [DISTWIDTH] IN BLOOD BY AUTOMATED COUNT: 18.3 % (ref 11.5–14.5)
ERYTHROCYTE [DISTWIDTH] IN BLOOD BY AUTOMATED COUNT: 18.5 % (ref 11.5–14.5)
ERYTHROCYTE [DISTWIDTH] IN BLOOD BY AUTOMATED COUNT: 19 % (ref 11.5–14.5)
ESCHERICHIA COLI BY PCR: NOT DETECTED
FIO2 ON VENT O2 ANALYZER: 80 %
FLUAV RNA LOWER RESP QL NAA+NON-PROBE: NOT DETECTED
FLUBV RNA LOWER RESP QL NAA+NON-PROBE: NOT DETECTED
GFR SERPL CREATININE-BSD FRML MDRD: > 60 ML/MIN/1.73M2
GFR SERPL CREATININE-BSD FRML MDRD: > 60 ML/MIN/1.73M2
GLUCOSE BLD-MCNC: 142 MG/DL (ref 70–108)
GLUCOSE SERPL-MCNC: 140 MG/DL (ref 70–108)
GLUCOSE SERPL-MCNC: 78 MG/DL (ref 70–108)
HADV DNA LOWER RESP QL NAA+NON-PROBE: NOT DETECTED
HAEMOPHILUS INFLUENZAE BY PCR: NOT DETECTED
HCO3 BLDA-SCNC: 21 MMOL/L (ref 23–28)
HCOV RNA LOWER RESP QL NAA+NON-PROBE: NOT DETECTED
HCT VFR BLD AUTO: 29.3 % (ref 42–52)
HCT VFR BLD AUTO: 33 % (ref 42–52)
HCT VFR BLD AUTO: 42.2 % (ref 42–52)
HGB BLD-MCNC: 10 GM/DL (ref 14–18)
HGB BLD-MCNC: 11 GM/DL (ref 14–18)
HGB BLD-MCNC: 14.2 GM/DL (ref 14–18)
HMPV RNA LOWER RESP QL NAA+NON-PROBE: NOT DETECTED
HPIV RNA LOWER RESP QL NAA+NON-PROBE: NOT DETECTED
IMM GRANULOCYTES # BLD AUTO: 0.48 THOU/MM3 (ref 0–0.07)
IMM GRANULOCYTES NFR BLD AUTO: 5.3 %
KLEBSIELLA AEROGENES BY PCR: NOT DETECTED
KLEBSIELLA OXYTOCA BY PCR: NOT DETECTED
KLEBSIELLA PNEUMONIAE GROUP BY PCR: NOT DETECTED
L PNEUMO DNA LOWER RESP QL NAA+NON-PROBE: NOT DETECTED
LACTATE SERPL-SCNC: 2.8 MMOL/L (ref 0.5–2)
LACTATE SERPL-SCNC: 3 MMOL/L (ref 0.5–2)
LACTATE SERPL-SCNC: 3.6 MMOL/L (ref 0.5–2)
LYMPHOCYTES ABSOLUTE: 0.4 THOU/MM3 (ref 1–4.8)
LYMPHOCYTES NFR BLD AUTO: 3.9 %
M PNEUMO DNA LOWER RESP QL NAA+NON-PROBE: NOT DETECTED
MCH RBC QN AUTO: 28.5 PG (ref 26–33)
MCH RBC QN AUTO: 28.8 PG (ref 26–33)
MCH RBC QN AUTO: 29.2 PG (ref 26–33)
MCHC RBC AUTO-ENTMCNC: 33.3 GM/DL (ref 32.2–35.5)
MCHC RBC AUTO-ENTMCNC: 33.6 GM/DL (ref 32.2–35.5)
MCHC RBC AUTO-ENTMCNC: 34.1 GM/DL (ref 32.2–35.5)
MCV RBC AUTO: 84.6 FL (ref 80–94)
MCV RBC AUTO: 85.7 FL (ref 80–94)
MCV RBC AUTO: 86.4 FL (ref 80–94)
MONOCYTES ABSOLUTE: 0.5 THOU/MM3 (ref 0.4–1.3)
MONOCYTES NFR BLD AUTO: 5.9 %
MORAXELLA CATARRHALIS BY PCR: DETECTED
MRSA DNA SPEC QL NAA+PROBE: NEGATIVE
NEUTROPHILS NFR BLD AUTO: 84.7 %
NRBC BLD AUTO-RTO: 1 /100 WBC
PATHOLOGIST REVIEW: ABNORMAL
PCO2 BLDA: 30 MMHG (ref 35–45)
PEEP SETTING VENT: 6 MMHG
PH BLDA: 7.45 [PH] (ref 7.35–7.45)
PIP: 24 CMH2O
PLATELET # BLD AUTO: 137 THOU/MM3 (ref 130–400)
PLATELET # BLD AUTO: 148 THOU/MM3 (ref 130–400)
PLATELET # BLD AUTO: 200 THOU/MM3 (ref 130–400)
PLATELET BLD QL SMEAR: ADEQUATE
PMV BLD AUTO: 11.1 FL (ref 9.4–12.4)
PMV BLD AUTO: 11.3 FL (ref 9.4–12.4)
PMV BLD AUTO: 11.9 FL (ref 9.4–12.4)
PO2 BLDA: 431 MMHG (ref 71–104)
POTASSIUM SERPL-SCNC: 3.8 MEQ/L (ref 3.5–5.2)
POTASSIUM SERPL-SCNC: 4.3 MEQ/L (ref 3.5–5.2)
PROTEUS SPECIES BY PCR: NOT DETECTED
PSEUDOMONAS AERUGINOSA BY PCR: NOT DETECTED
RBC # BLD AUTO: 3.42 MILL/MM3 (ref 4.7–6.1)
RBC # BLD AUTO: 3.82 MILL/MM3 (ref 4.7–6.1)
RBC # BLD AUTO: 4.99 MILL/MM3 (ref 4.7–6.1)
RESISTANT GENE MECA/C & MREJ BY PCR: ABNORMAL
RESISTANT GENE NDM BY PCR: ABNORMAL
RSV RNA LOWER RESP QL NAA+NON-PROBE: NOT DETECTED
RV+EV RNA LOWER RESP QL NAA+NON-PROBE: NOT DETECTED
SAO2 % BLDA: 100 %
SEGMENTED NEUTROPHILS ABSOLUTE COUNT: 7.6 THOU/MM3 (ref 1.8–7.7)
SERRATIA MARCESCENS BY PCR: NOT DETECTED
SODIUM SERPL-SCNC: 133 MEQ/L (ref 135–145)
SODIUM SERPL-SCNC: 135 MEQ/L (ref 135–145)
SOURCE: ABNORMAL
SPECIMEN ACCEPTABILITY: ABNORMAL
STAPH AUREUS BY PCR: NOT DETECTED
STREP AGALACTIAE BY PCR: NOT DETECTED
STREP PNEUMONIAE BY PCR: NOT DETECTED
STREP PYOGENES BY PCR: NOT DETECTED
VENTILATION MODE VENT: ABNORMAL
WBC # BLD AUTO: 9 THOU/MM3 (ref 4.8–10.8)
WBC # BLD AUTO: 9.4 THOU/MM3 (ref 4.8–10.8)
WBC # BLD AUTO: 9.8 THOU/MM3 (ref 4.8–10.8)

## 2023-10-10 PROCEDURE — 6360000002 HC RX W HCPCS: Performed by: NURSE PRACTITIONER

## 2023-10-10 PROCEDURE — 2500000003 HC RX 250 WO HCPCS

## 2023-10-10 PROCEDURE — 94002 VENT MGMT INPAT INIT DAY: CPT

## 2023-10-10 PROCEDURE — 6360000002 HC RX W HCPCS

## 2023-10-10 PROCEDURE — 94761 N-INVAS EAR/PLS OXIMETRY MLT: CPT

## 2023-10-10 PROCEDURE — 82803 BLOOD GASES ANY COMBINATION: CPT

## 2023-10-10 PROCEDURE — 2000000000 HC ICU R&B

## 2023-10-10 PROCEDURE — 87205 SMEAR GRAM STAIN: CPT

## 2023-10-10 PROCEDURE — 0BH17EZ INSERTION OF ENDOTRACHEAL AIRWAY INTO TRACHEA, VIA NATURAL OR ARTIFICIAL OPENING: ICD-10-PCS | Performed by: EMERGENCY MEDICINE

## 2023-10-10 PROCEDURE — 02HV33Z INSERTION OF INFUSION DEVICE INTO SUPERIOR VENA CAVA, PERCUTANEOUS APPROACH: ICD-10-PCS | Performed by: EMERGENCY MEDICINE

## 2023-10-10 PROCEDURE — 87631 RESP VIRUS 3-5 TARGETS: CPT

## 2023-10-10 PROCEDURE — 80048 BASIC METABOLIC PNL TOTAL CA: CPT

## 2023-10-10 PROCEDURE — 71045 X-RAY EXAM CHEST 1 VIEW: CPT

## 2023-10-10 PROCEDURE — 83605 ASSAY OF LACTIC ACID: CPT

## 2023-10-10 PROCEDURE — 87798 DETECT AGENT NOS DNA AMP: CPT

## 2023-10-10 PROCEDURE — 2700000000 HC OXYGEN THERAPY PER DAY

## 2023-10-10 PROCEDURE — 2580000003 HC RX 258: Performed by: EMERGENCY MEDICINE

## 2023-10-10 PROCEDURE — 36556 INSERT NON-TUNNEL CV CATH: CPT

## 2023-10-10 PROCEDURE — 36600 WITHDRAWAL OF ARTERIAL BLOOD: CPT

## 2023-10-10 PROCEDURE — 87541 LEGION PNEUMO DNA AMP PROB: CPT

## 2023-10-10 PROCEDURE — 87486 CHLMYD PNEUM DNA AMP PROBE: CPT

## 2023-10-10 PROCEDURE — 36415 COLL VENOUS BLD VENIPUNCTURE: CPT

## 2023-10-10 PROCEDURE — 6370000000 HC RX 637 (ALT 250 FOR IP)

## 2023-10-10 PROCEDURE — 6360000002 HC RX W HCPCS: Performed by: EMERGENCY MEDICINE

## 2023-10-10 PROCEDURE — 99291 CRITICAL CARE FIRST HOUR: CPT | Performed by: INTERNAL MEDICINE

## 2023-10-10 PROCEDURE — 89220 SPUTUM SPECIMEN COLLECTION: CPT

## 2023-10-10 PROCEDURE — 85027 COMPLETE CBC AUTOMATED: CPT

## 2023-10-10 PROCEDURE — 5A1955Z RESPIRATORY VENTILATION, GREATER THAN 96 CONSECUTIVE HOURS: ICD-10-PCS | Performed by: SURGERY

## 2023-10-10 PROCEDURE — 82947 ASSAY GLUCOSE BLOOD QUANT: CPT

## 2023-10-10 PROCEDURE — 82330 ASSAY OF CALCIUM: CPT

## 2023-10-10 PROCEDURE — 6370000000 HC RX 637 (ALT 250 FOR IP): Performed by: EMERGENCY MEDICINE

## 2023-10-10 PROCEDURE — 31500 INSERT EMERGENCY AIRWAY: CPT

## 2023-10-10 PROCEDURE — 99231 SBSQ HOSP IP/OBS SF/LOW 25: CPT | Performed by: NEUROLOGICAL SURGERY

## 2023-10-10 PROCEDURE — 2500000003 HC RX 250 WO HCPCS: Performed by: EMERGENCY MEDICINE

## 2023-10-10 PROCEDURE — 87581 M.PNEUMON DNA AMP PROBE: CPT

## 2023-10-10 PROCEDURE — 87070 CULTURE OTHR SPECIMN AEROBIC: CPT

## 2023-10-10 RX ORDER — NOREPINEPHRINE BITARTRATE 0.06 MG/ML
INJECTION, SOLUTION INTRAVENOUS
Status: COMPLETED
Start: 2023-10-10 | End: 2023-10-10

## 2023-10-10 RX ORDER — LORAZEPAM 2 MG/ML
4 INJECTION INTRAMUSCULAR ONCE
Status: COMPLETED | OUTPATIENT
Start: 2023-10-10 | End: 2023-10-10

## 2023-10-10 RX ORDER — MIDAZOLAM HYDROCHLORIDE 1 MG/ML
4 INJECTION INTRAMUSCULAR; INTRAVENOUS ONCE
Status: COMPLETED | OUTPATIENT
Start: 2023-10-10 | End: 2023-10-10

## 2023-10-10 RX ORDER — NOREPINEPHRINE BITARTRATE 0.06 MG/ML
1-100 INJECTION, SOLUTION INTRAVENOUS CONTINUOUS
Status: DISCONTINUED | OUTPATIENT
Start: 2023-10-10 | End: 2023-10-10

## 2023-10-10 RX ORDER — KETAMINE HCL IN NACL, ISO-OSM 100MG/10ML
SYRINGE (ML) INJECTION
Status: DISPENSED
Start: 2023-10-10 | End: 2023-10-10

## 2023-10-10 RX ORDER — LORAZEPAM 2 MG/ML
INJECTION INTRAMUSCULAR
Status: DISPENSED
Start: 2023-10-10 | End: 2023-10-10

## 2023-10-10 RX ORDER — MORPHINE SULFATE 4 MG/ML
4 INJECTION, SOLUTION INTRAMUSCULAR; INTRAVENOUS ONCE
Status: COMPLETED | OUTPATIENT
Start: 2023-10-10 | End: 2023-10-10

## 2023-10-10 RX ORDER — CHLORHEXIDINE GLUCONATE ORAL RINSE 1.2 MG/ML
15 SOLUTION DENTAL 2 TIMES DAILY
Status: DISCONTINUED | OUTPATIENT
Start: 2023-10-10 | End: 2023-10-18

## 2023-10-10 RX ORDER — GABAPENTIN 300 MG/1
300 CAPSULE ORAL 3 TIMES DAILY
Status: ON HOLD | COMMUNITY
Start: 2023-10-05

## 2023-10-10 RX ORDER — NOREPINEPHRINE BITARTRATE 0.06 MG/ML
1-100 INJECTION, SOLUTION INTRAVENOUS CONTINUOUS
Status: DISCONTINUED | OUTPATIENT
Start: 2023-10-10 | End: 2023-10-17

## 2023-10-10 RX ORDER — HYDROCODONE BITARTRATE AND ACETAMINOPHEN 10; 325 MG/1; MG/1
1 TABLET ORAL EVERY 8 HOURS PRN
Status: ON HOLD | COMMUNITY
Start: 2023-10-05 | End: 2023-10-24 | Stop reason: HOSPADM

## 2023-10-10 RX ORDER — ETOMIDATE 2 MG/ML
INJECTION INTRAVENOUS
Status: COMPLETED
Start: 2023-10-10 | End: 2023-10-10

## 2023-10-10 RX ORDER — ERGOCALCIFEROL 1.25 MG/1
1 CAPSULE ORAL
Status: ON HOLD | COMMUNITY
Start: 2023-08-30 | End: 2023-10-24 | Stop reason: HOSPADM

## 2023-10-10 RX ORDER — PROPOFOL 10 MG/ML
5-50 INJECTION, EMULSION INTRAVENOUS CONTINUOUS
Status: DISCONTINUED | OUTPATIENT
Start: 2023-10-10 | End: 2023-10-10

## 2023-10-10 RX ORDER — PROPOFOL 10 MG/ML
5-50 INJECTION, EMULSION INTRAVENOUS CONTINUOUS
Status: DISCONTINUED | OUTPATIENT
Start: 2023-10-10 | End: 2023-10-11

## 2023-10-10 RX ORDER — HYDROCODONE BITARTRATE AND ACETAMINOPHEN 5; 325 MG/1; MG/1
1 TABLET ORAL
Status: ON HOLD | COMMUNITY
Start: 2023-10-05 | End: 2023-10-24 | Stop reason: HOSPADM

## 2023-10-10 RX ORDER — MORPHINE SULFATE 2 MG/ML
INJECTION, SOLUTION INTRAMUSCULAR; INTRAVENOUS
Status: DISPENSED
Start: 2023-10-10 | End: 2023-10-10

## 2023-10-10 RX ORDER — LORAZEPAM 2 MG/ML
INJECTION INTRAMUSCULAR
Status: COMPLETED
Start: 2023-10-10 | End: 2023-10-10

## 2023-10-10 RX ORDER — PROPOFOL 10 MG/ML
INJECTION, EMULSION INTRAVENOUS
Status: COMPLETED
Start: 2023-10-10 | End: 2023-10-10

## 2023-10-10 RX ORDER — ETOMIDATE 2 MG/ML
20 INJECTION INTRAVENOUS ONCE
Status: COMPLETED | OUTPATIENT
Start: 2023-10-10 | End: 2023-10-10

## 2023-10-10 RX ADMIN — LEVETIRACETAM 500 MG: 100 INJECTION, SOLUTION INTRAVENOUS at 22:58

## 2023-10-10 RX ADMIN — Medication 10 MCG/MIN: at 06:36

## 2023-10-10 RX ADMIN — PHENOBARBITAL SODIUM 65 MG: 65 INJECTION INTRAMUSCULAR at 19:52

## 2023-10-10 RX ADMIN — LORAZEPAM 4 MG: 2 INJECTION INTRAMUSCULAR at 00:27

## 2023-10-10 RX ADMIN — Medication 24 MCG/MIN: at 23:59

## 2023-10-10 RX ADMIN — PROPOFOL 45 MCG/KG/MIN: 10 INJECTION, EMULSION INTRAVENOUS at 06:33

## 2023-10-10 RX ADMIN — Medication 1.2 MCG/KG/HR: at 11:40

## 2023-10-10 RX ADMIN — MIDAZOLAM 4 MG: 1 INJECTION INTRAMUSCULAR; INTRAVENOUS at 19:52

## 2023-10-10 RX ADMIN — Medication 1 TABLET: at 09:15

## 2023-10-10 RX ADMIN — CHLORHEXIDINE GLUCONATE 15 ML: 1.2 RINSE ORAL at 19:52

## 2023-10-10 RX ADMIN — NOREPINEPHRINE BITARTRATE 5 MCG/MIN: 0.06 INJECTION, SOLUTION INTRAVENOUS at 01:22

## 2023-10-10 RX ADMIN — PHENOBARBITAL SODIUM 65 MG: 65 INJECTION INTRAMUSCULAR at 06:44

## 2023-10-10 RX ADMIN — ETOMIDATE 20 MG: 2 INJECTION INTRAVENOUS at 00:28

## 2023-10-10 RX ADMIN — CEFTRIAXONE SODIUM 1000 MG: 1 INJECTION, POWDER, FOR SOLUTION INTRAMUSCULAR; INTRAVENOUS at 02:15

## 2023-10-10 RX ADMIN — SODIUM CHLORIDE, PRESERVATIVE FREE 10 ML: 5 INJECTION INTRAVENOUS at 08:59

## 2023-10-10 RX ADMIN — Medication 5 MCG/MIN: at 01:22

## 2023-10-10 RX ADMIN — THIAMINE HYDROCHLORIDE 100 MG: 100 INJECTION, SOLUTION INTRAMUSCULAR; INTRAVENOUS at 08:52

## 2023-10-10 RX ADMIN — ACETAMINOPHEN 650 MG: 650 SUPPOSITORY RECTAL at 19:53

## 2023-10-10 RX ADMIN — SODIUM CHLORIDE: 9 INJECTION, SOLUTION INTRAVENOUS at 09:26

## 2023-10-10 RX ADMIN — Medication 1.2 MCG/KG/HR: at 06:34

## 2023-10-10 RX ADMIN — SODIUM CHLORIDE, PRESERVATIVE FREE 10 ML: 5 INJECTION INTRAVENOUS at 19:53

## 2023-10-10 RX ADMIN — PROPOFOL 20 MCG/KG/MIN: 10 INJECTION, EMULSION INTRAVENOUS at 00:59

## 2023-10-10 RX ADMIN — CHLORHEXIDINE GLUCONATE 15 ML: 1.2 RINSE ORAL at 17:25

## 2023-10-10 RX ADMIN — SODIUM CHLORIDE: 9 INJECTION, SOLUTION INTRAVENOUS at 19:44

## 2023-10-10 RX ADMIN — FOLIC ACID 1 MG: 5 INJECTION, SOLUTION INTRAMUSCULAR; INTRAVENOUS; SUBCUTANEOUS at 08:58

## 2023-10-10 RX ADMIN — FOLIC ACID 1 MG: 5 INJECTION, SOLUTION INTRAMUSCULAR; INTRAVENOUS; SUBCUTANEOUS at 01:11

## 2023-10-10 RX ADMIN — MORPHINE SULFATE 4 MG: 4 INJECTION, SOLUTION INTRAMUSCULAR; INTRAVENOUS at 00:28

## 2023-10-10 RX ADMIN — ACETAMINOPHEN 650 MG: 325 TABLET ORAL at 13:36

## 2023-10-10 RX ADMIN — ACETAMINOPHEN 650 MG: 650 SUPPOSITORY RECTAL at 07:35

## 2023-10-10 RX ADMIN — LORAZEPAM 4 MG: 2 INJECTION INTRAMUSCULAR; INTRAVENOUS at 00:27

## 2023-10-10 RX ADMIN — PROPOFOL 20 MCG/KG/MIN: 10 INJECTION, EMULSION INTRAVENOUS at 13:35

## 2023-10-10 RX ADMIN — ETOMIDATE 20 MG: 20 INJECTION, SOLUTION INTRAVENOUS at 00:28

## 2023-10-10 RX ADMIN — LEVETIRACETAM 500 MG: 100 INJECTION, SOLUTION INTRAVENOUS at 11:29

## 2023-10-10 ASSESSMENT — PULMONARY FUNCTION TESTS
PIF_VALUE: 20
PIF_VALUE: 24
PIF_VALUE: 28
PIF_VALUE: 26
PIF_VALUE: 29
PIF_VALUE: 22

## 2023-10-10 NOTE — PROCEDURES
Central Line    Date/Time: 10/10/2023 2:52 AM    Performed by: Luiz Last DO  Authorized by: Luiz Last DO  Consent: Verbal consent obtained. Risks and benefits: risks, benefits and alternatives were discussed  Consent given by: siblings, only son.   Patient identity confirmed: arm band and hospital-assigned identification number  Indications: vascular access  Anesthesia: local infiltration    Anesthesia:  Local Anesthetic: lidocaine 1% without epinephrine    Sedation:  Patient sedated: yes  Sedatives: propofol    Preparation: skin prepped with 2% chlorhexidine  Skin prep agent dried: skin prep agent completely dried prior to procedure  Sterile barriers: all five maximum sterile barriers used - cap, mask, sterile gown, sterile gloves, and large sterile sheet  Hand hygiene: hand hygiene performed prior to central venous catheter insertion  Location details: right internal jugular  Patient position: Trendelenburg  Catheter type: triple lumen  Catheter size: 7 Fr  Pre-procedure: landmarks identified  Ultrasound guidance: yes  Sterile ultrasound techniques: sterile gel and sterile probe covers were used  Number of attempts: 5 or more (2 failed attempts at subclavian, 3 attempts at IJ)  Successful placement: yes  Post-procedure: line sutured and dressing applied  Assessment: blood return through all ports, free fluid flow, placement verified by x-ray and no pneumothorax on x-ray  Patient tolerance: patient tolerated the procedure well with no immediate complications

## 2023-10-10 NOTE — FLOWSHEET NOTE
7923 Scribing for Primary RN Rafael Sanders RN. Also present at bedside [facility] RT, Rafael Sanders RN, Dr Welch Officer, and Allyson FERNANDEZ.     8787 Timeout performed by Dr. Welch Officer and this RN for intubation. Pt being pre-oxygenated, sats 99% at this time on bipap, RR 40.    0025 Ralph Milesing RN at bedside to place PIV 22g L wrist at this time. Sat 100% on bipap, RR 39.    0027 4mg Ativan given by Rafael Sanders RN. Bipap placed on standby, pt now being bagged manually by [facility] RT.   0028 4mg Morphine given by   0028 10mg Etomidate given by Rafael Sanders RN.   9639 Sats 100% at this time. 0030 10mg Etomidate given by Rafael Sanders RN.     0030 Pt being manually bagged again at this time by RT [facility]. Sat 100%    0033 Sat 100% 7.5 ETT placed, secured at lip @26 positive color change, equal chest rise/fall , negative epigastric sounds, bilateral breath sounds auscultated.     0035 OG placed and secured at 65cm

## 2023-10-10 NOTE — SIGNIFICANT EVENT
Pt was hypercapnic but not hypoxic. First we tried high flow in the hopes that the Pt wouldn't feel an oxygen demand causing him to breath more. That did not bring relief. Pt was a little agitated and quite tachypneic. I started low dose Precedex in the hope to get the Pt to relax and breathe a normal rate, while still maintaining his respiratory drive. The became more relaxed, and at this point we thought he could tolerate BIPAP. We did not start it initially as I was concerned he was high risk for aspiration. About 15 minutes after BIPAP Pt began to snore and was no longer arousable by loud voice or sternal rub. I was concerned the Pt was not protecting his airway and that this persistent tachypnea would lead to respiratory muscle fatigue and hypoxia, as well as this hypercapnia causing worsening of his mental status. We decided to intubate. I spoke with family beforehand about this possibility. After intubation I updated them and also discussed risks and benefits of central line, Pt's brother and his sister Jill Jansen verbally consented.

## 2023-10-10 NOTE — PROCEDURES
Intubation    Date/Time: 10/10/2023 1:43 AM    Performed by: Kane Stroud DO  Authorized by: Kane Stroud DO  Consent: The procedure was performed in an emergent situation. Patient identity confirmed: arm band and hospital-assigned identification number  Time out: Immediately prior to procedure a \"time out\" was called to verify the correct patient, procedure, equipment, support staff and site/side marked as required. Indications: airway protection and hypercapnia (Pt was hypercapnic, and despite attempts with high flow and BIPAP we were unable to get the Pt to settle down. He had a persistent tachypnea with rate)  Intubation method: video-assisted  Patient status: sedated  Preoxygenation: BVM  Sedation: Ativan, morphine, and etomidate. Paralytic: none  Laryngoscope size: s4. Tube size: 8.0 mm  Tube type: cuffed  Number of attempts: 2  Ventilation between attempts: BVM  Cricoid pressure: yes  Cords visualized: yes  Post-procedure assessment: chest rise and ETCO2 monitor  Breath sounds: equal  Cuff inflated: yes  ETT to lip: 24 cm  Tube secured with: ETT hart  Chest x-ray interpreted by me.   Chest x-ray findings: endotracheal tube too low  Tube repositioned: tube repositioned successfully  Patient tolerance: patient tolerated the procedure well with no immediate complications  Comments: Initial CXR reading, ETT was about 2.3 cm above the michelle, it was retracted to 24 at the lip

## 2023-10-11 ENCOUNTER — APPOINTMENT (OUTPATIENT)
Dept: GENERAL RADIOLOGY | Age: 43
DRG: 710 | End: 2023-10-11
Payer: COMMERCIAL

## 2023-10-11 ENCOUNTER — APPOINTMENT (OUTPATIENT)
Dept: CT IMAGING | Age: 43
DRG: 710 | End: 2023-10-11
Payer: COMMERCIAL

## 2023-10-11 PROBLEM — E43 SEVERE MALNUTRITION (HCC): Status: ACTIVE | Noted: 2023-10-11

## 2023-10-11 LAB
ALBUMIN SERPL BCG-MCNC: 1.9 G/DL (ref 3.5–5.1)
ALP SERPL-CCNC: 99 U/L (ref 38–126)
ALT SERPL W/O P-5'-P-CCNC: 38 U/L (ref 11–66)
ANION GAP SERPL CALC-SCNC: 16 MEQ/L (ref 8–16)
ANION GAP SERPL CALC-SCNC: 17 MEQ/L (ref 8–16)
AST SERPL-CCNC: 114 U/L (ref 5–40)
BACTERIA UR CULT: NORMAL
BILIRUB SERPL-MCNC: 0.7 MG/DL (ref 0.3–1.2)
BUN SERPL-MCNC: 40 MG/DL (ref 7–22)
BUN SERPL-MCNC: 41 MG/DL (ref 7–22)
CA-I BLD ISE-SCNC: 1.09 MMOL/L (ref 1.12–1.32)
CA-I BLD ISE-SCNC: 1.14 MMOL/L (ref 1.12–1.32)
CALCIUM SERPL-MCNC: 8 MG/DL (ref 8.5–10.5)
CALCIUM SERPL-MCNC: 8 MG/DL (ref 8.5–10.5)
CHLORIDE SERPL-SCNC: 105 MEQ/L (ref 98–111)
CHLORIDE SERPL-SCNC: 108 MEQ/L (ref 98–111)
CO2 SERPL-SCNC: 15 MEQ/L (ref 23–33)
CO2 SERPL-SCNC: 17 MEQ/L (ref 23–33)
CREAT SERPL-MCNC: 1 MG/DL (ref 0.4–1.2)
CREAT SERPL-MCNC: 1 MG/DL (ref 0.4–1.2)
DEPRECATED RDW RBC AUTO: 59.7 FL (ref 35–45)
ERYTHROCYTE [DISTWIDTH] IN BLOOD BY AUTOMATED COUNT: 18.9 % (ref 11.5–14.5)
GFR SERPL CREATININE-BSD FRML MDRD: > 60 ML/MIN/1.73M2
GFR SERPL CREATININE-BSD FRML MDRD: > 60 ML/MIN/1.73M2
GLUCOSE BLD STRIP.AUTO-MCNC: 54 MG/DL (ref 70–108)
GLUCOSE BLD STRIP.AUTO-MCNC: 77 MG/DL (ref 70–108)
GLUCOSE BLD STRIP.AUTO-MCNC: 83 MG/DL (ref 70–108)
GLUCOSE SERPL-MCNC: 79 MG/DL (ref 70–108)
GLUCOSE SERPL-MCNC: 97 MG/DL (ref 70–108)
HCT VFR BLD AUTO: 29.6 % (ref 42–52)
HGB BLD-MCNC: 9.9 GM/DL (ref 14–18)
LACTATE SERPL-SCNC: 3.2 MMOL/L (ref 0.5–2)
LACTATE SERPL-SCNC: 3.7 MMOL/L (ref 0.5–2)
LACTATE SERPL-SCNC: 3.7 MMOL/L (ref 0.5–2)
LACTATE SERPL-SCNC: 3.8 MMOL/L (ref 0.5–2)
MAGNESIUM SERPL-MCNC: 2 MG/DL (ref 1.6–2.4)
MCH RBC QN AUTO: 28.9 PG (ref 26–33)
MCHC RBC AUTO-ENTMCNC: 33.4 GM/DL (ref 32.2–35.5)
MCV RBC AUTO: 86.3 FL (ref 80–94)
PLATELET # BLD AUTO: 114 THOU/MM3 (ref 130–400)
PMV BLD AUTO: 10.8 FL (ref 9.4–12.4)
POTASSIUM SERPL-SCNC: 3.7 MEQ/L (ref 3.5–5.2)
POTASSIUM SERPL-SCNC: 3.9 MEQ/L (ref 3.5–5.2)
PROT SERPL-MCNC: 5.1 G/DL (ref 6.1–8)
RBC # BLD AUTO: 3.43 MILL/MM3 (ref 4.7–6.1)
SODIUM SERPL-SCNC: 138 MEQ/L (ref 135–145)
SODIUM SERPL-SCNC: 140 MEQ/L (ref 135–145)
T4 FREE SERPL-MCNC: 0.46 NG/DL (ref 0.93–1.76)
TSH SERPL DL<=0.005 MIU/L-ACNC: 2.03 UIU/ML (ref 0.4–4.2)
WBC # BLD AUTO: 11.9 THOU/MM3 (ref 4.8–10.8)

## 2023-10-11 PROCEDURE — 85027 COMPLETE CBC AUTOMATED: CPT

## 2023-10-11 PROCEDURE — 6370000000 HC RX 637 (ALT 250 FOR IP): Performed by: EMERGENCY MEDICINE

## 2023-10-11 PROCEDURE — 80053 COMPREHEN METABOLIC PANEL: CPT

## 2023-10-11 PROCEDURE — 6360000002 HC RX W HCPCS: Performed by: INTERNAL MEDICINE

## 2023-10-11 PROCEDURE — 6360000002 HC RX W HCPCS

## 2023-10-11 PROCEDURE — 6370000000 HC RX 637 (ALT 250 FOR IP): Performed by: INTERNAL MEDICINE

## 2023-10-11 PROCEDURE — 2000000000 HC ICU R&B

## 2023-10-11 PROCEDURE — 82330 ASSAY OF CALCIUM: CPT

## 2023-10-11 PROCEDURE — 2500000003 HC RX 250 WO HCPCS: Performed by: INTERNAL MEDICINE

## 2023-10-11 PROCEDURE — 82948 REAGENT STRIP/BLOOD GLUCOSE: CPT

## 2023-10-11 PROCEDURE — 71045 X-RAY EXAM CHEST 1 VIEW: CPT

## 2023-10-11 PROCEDURE — 6360000002 HC RX W HCPCS: Performed by: STUDENT IN AN ORGANIZED HEALTH CARE EDUCATION/TRAINING PROGRAM

## 2023-10-11 PROCEDURE — 2500000003 HC RX 250 WO HCPCS: Performed by: EMERGENCY MEDICINE

## 2023-10-11 PROCEDURE — 70450 CT HEAD/BRAIN W/O DYE: CPT

## 2023-10-11 PROCEDURE — 95711 VEEG 2-12 HR UNMONITORED: CPT

## 2023-10-11 PROCEDURE — 95819 EEG AWAKE AND ASLEEP: CPT

## 2023-10-11 PROCEDURE — 2580000003 HC RX 258: Performed by: STUDENT IN AN ORGANIZED HEALTH CARE EDUCATION/TRAINING PROGRAM

## 2023-10-11 PROCEDURE — P9047 ALBUMIN (HUMAN), 25%, 50ML: HCPCS | Performed by: STUDENT IN AN ORGANIZED HEALTH CARE EDUCATION/TRAINING PROGRAM

## 2023-10-11 PROCEDURE — 6370000000 HC RX 637 (ALT 250 FOR IP): Performed by: NURSE PRACTITIONER

## 2023-10-11 PROCEDURE — 2580000003 HC RX 258

## 2023-10-11 PROCEDURE — 95718 EEG PHYS/QHP 2-12 HR W/VEEG: CPT | Performed by: PSYCHIATRY & NEUROLOGY

## 2023-10-11 PROCEDURE — 83735 ASSAY OF MAGNESIUM: CPT

## 2023-10-11 PROCEDURE — 6370000000 HC RX 637 (ALT 250 FOR IP)

## 2023-10-11 PROCEDURE — 94761 N-INVAS EAR/PLS OXIMETRY MLT: CPT

## 2023-10-11 PROCEDURE — 36415 COLL VENOUS BLD VENIPUNCTURE: CPT

## 2023-10-11 PROCEDURE — 2700000000 HC OXYGEN THERAPY PER DAY

## 2023-10-11 PROCEDURE — 99291 CRITICAL CARE FIRST HOUR: CPT | Performed by: INTERNAL MEDICINE

## 2023-10-11 PROCEDURE — 83605 ASSAY OF LACTIC ACID: CPT

## 2023-10-11 PROCEDURE — 2580000003 HC RX 258: Performed by: EMERGENCY MEDICINE

## 2023-10-11 PROCEDURE — 84439 ASSAY OF FREE THYROXINE: CPT

## 2023-10-11 PROCEDURE — 6360000002 HC RX W HCPCS: Performed by: EMERGENCY MEDICINE

## 2023-10-11 PROCEDURE — 94003 VENT MGMT INPAT SUBQ DAY: CPT

## 2023-10-11 PROCEDURE — 84481 FREE ASSAY (FT-3): CPT

## 2023-10-11 PROCEDURE — 84443 ASSAY THYROID STIM HORMONE: CPT

## 2023-10-11 RX ORDER — DEXMEDETOMIDINE HYDROCHLORIDE 4 UG/ML
.1-1.5 INJECTION, SOLUTION INTRAVENOUS CONTINUOUS
Status: DISCONTINUED | OUTPATIENT
Start: 2023-10-11 | End: 2023-10-17

## 2023-10-11 RX ORDER — HYDROCODONE BITARTRATE AND ACETAMINOPHEN 5; 325 MG/1; MG/1
2 TABLET ORAL EVERY 6 HOURS PRN
Status: DISCONTINUED | OUTPATIENT
Start: 2023-10-11 | End: 2023-10-24 | Stop reason: HOSPADM

## 2023-10-11 RX ORDER — DEXTROSE MONOHYDRATE 100 MG/ML
INJECTION, SOLUTION INTRAVENOUS CONTINUOUS PRN
Status: DISCONTINUED | OUTPATIENT
Start: 2023-10-11 | End: 2023-10-24 | Stop reason: HOSPADM

## 2023-10-11 RX ORDER — CALCIUM GLUCONATE 20 MG/ML
2000 INJECTION, SOLUTION INTRAVENOUS ONCE
Status: COMPLETED | OUTPATIENT
Start: 2023-10-11 | End: 2023-10-11

## 2023-10-11 RX ORDER — LEVOTHYROXINE SODIUM 0.05 MG/1
50 TABLET ORAL DAILY
Status: DISCONTINUED | OUTPATIENT
Start: 2023-10-11 | End: 2023-10-24 | Stop reason: HOSPADM

## 2023-10-11 RX ORDER — MORPHINE SULFATE 2 MG/ML
2 INJECTION, SOLUTION INTRAMUSCULAR; INTRAVENOUS
Status: DISCONTINUED | OUTPATIENT
Start: 2023-10-11 | End: 2023-10-18

## 2023-10-11 RX ORDER — IBUPROFEN 600 MG/1
1 TABLET ORAL PRN
Status: DISCONTINUED | OUTPATIENT
Start: 2023-10-11 | End: 2023-10-24 | Stop reason: HOSPADM

## 2023-10-11 RX ORDER — HYDROCODONE BITARTRATE AND ACETAMINOPHEN 5; 325 MG/1; MG/1
1 TABLET ORAL EVERY 6 HOURS PRN
Status: DISCONTINUED | OUTPATIENT
Start: 2023-10-11 | End: 2023-10-24 | Stop reason: HOSPADM

## 2023-10-11 RX ORDER — ALBUMIN (HUMAN) 12.5 G/50ML
25 SOLUTION INTRAVENOUS EVERY 6 HOURS
Status: COMPLETED | OUTPATIENT
Start: 2023-10-11 | End: 2023-10-12

## 2023-10-11 RX ORDER — ACETAMINOPHEN 325 MG/1
650 TABLET ORAL EVERY 6 HOURS PRN
Status: DISCONTINUED | OUTPATIENT
Start: 2023-10-11 | End: 2023-10-11

## 2023-10-11 RX ORDER — ACETAMINOPHEN 325 MG/1
650 TABLET ORAL EVERY 6 HOURS PRN
Status: DISCONTINUED | OUTPATIENT
Start: 2023-10-11 | End: 2023-10-18

## 2023-10-11 RX ORDER — LANSOPRAZOLE 30 MG/1
30 TABLET, ORALLY DISINTEGRATING, DELAYED RELEASE ORAL DAILY
Status: DISCONTINUED | OUTPATIENT
Start: 2023-10-11 | End: 2023-10-20

## 2023-10-11 RX ORDER — PROPOFOL 10 MG/ML
5-50 INJECTION, EMULSION INTRAVENOUS CONTINUOUS
Status: DISCONTINUED | OUTPATIENT
Start: 2023-10-11 | End: 2023-10-16

## 2023-10-11 RX ADMIN — MORPHINE SULFATE 2 MG: 2 INJECTION, SOLUTION INTRAMUSCULAR; INTRAVENOUS at 09:44

## 2023-10-11 RX ADMIN — PROPOFOL 10 MCG/KG/MIN: 10 INJECTION, EMULSION INTRAVENOUS at 15:40

## 2023-10-11 RX ADMIN — CEFTRIAXONE SODIUM 1000 MG: 1 INJECTION, POWDER, FOR SOLUTION INTRAMUSCULAR; INTRAVENOUS at 02:09

## 2023-10-11 RX ADMIN — DEXTROSE MONOHYDRATE 125 ML: 100 INJECTION, SOLUTION INTRAVENOUS at 20:32

## 2023-10-11 RX ADMIN — Medication 0.8 MCG/KG/HR: at 00:18

## 2023-10-11 RX ADMIN — Medication 20 MCG/MIN: at 22:15

## 2023-10-11 RX ADMIN — PHENOBARBITAL SODIUM 65 MG: 65 INJECTION INTRAMUSCULAR at 09:46

## 2023-10-11 RX ADMIN — Medication 5 MCG/MIN: at 09:33

## 2023-10-11 RX ADMIN — Medication 1 TABLET: at 09:50

## 2023-10-11 RX ADMIN — SODIUM CHLORIDE, PRESERVATIVE FREE 10 ML: 5 INJECTION INTRAVENOUS at 21:23

## 2023-10-11 RX ADMIN — PHENOBARBITAL SODIUM 65 MG: 65 INJECTION INTRAMUSCULAR at 21:23

## 2023-10-11 RX ADMIN — Medication 1.2 MCG/KG/HR: at 20:09

## 2023-10-11 RX ADMIN — LEVETIRACETAM 500 MG: 100 INJECTION, SOLUTION INTRAVENOUS at 23:23

## 2023-10-11 RX ADMIN — Medication 0.8 MCG/KG/HR: at 09:28

## 2023-10-11 RX ADMIN — THIAMINE HYDROCHLORIDE 100 MG: 100 INJECTION, SOLUTION INTRAMUSCULAR; INTRAVENOUS at 09:55

## 2023-10-11 RX ADMIN — CALCIUM GLUCONATE 2000 MG: 20 INJECTION, SOLUTION INTRAVENOUS at 13:08

## 2023-10-11 RX ADMIN — LANSOPRAZOLE 30 MG: 30 TABLET, ORALLY DISINTEGRATING, DELAYED RELEASE ORAL at 16:11

## 2023-10-11 RX ADMIN — SODIUM CHLORIDE: 9 INJECTION, SOLUTION INTRAVENOUS at 21:27

## 2023-10-11 RX ADMIN — SODIUM CHLORIDE: 9 INJECTION, SOLUTION INTRAVENOUS at 13:06

## 2023-10-11 RX ADMIN — LEVETIRACETAM 500 MG: 100 INJECTION, SOLUTION INTRAVENOUS at 11:57

## 2023-10-11 RX ADMIN — CHLORHEXIDINE GLUCONATE 15 ML: 1.2 RINSE ORAL at 21:23

## 2023-10-11 RX ADMIN — LEVOTHYROXINE SODIUM 50 MCG: 0.05 TABLET ORAL at 16:24

## 2023-10-11 RX ADMIN — SODIUM CHLORIDE, PRESERVATIVE FREE 10 ML: 5 INJECTION INTRAVENOUS at 09:52

## 2023-10-11 RX ADMIN — ALBUMIN (HUMAN) 25 G: 0.25 INJECTION, SOLUTION INTRAVENOUS at 23:44

## 2023-10-11 RX ADMIN — ACETAMINOPHEN 650 MG: 325 TABLET ORAL at 09:50

## 2023-10-11 RX ADMIN — HYDROCODONE BITARTRATE AND ACETAMINOPHEN 2 TABLET: 5; 325 TABLET ORAL at 13:02

## 2023-10-11 RX ADMIN — DEXTROSE MONOHYDRATE 125 ML: 100 INJECTION, SOLUTION INTRAVENOUS at 03:58

## 2023-10-11 RX ADMIN — FOLIC ACID 1 MG: 5 INJECTION, SOLUTION INTRAMUSCULAR; INTRAVENOUS; SUBCUTANEOUS at 10:00

## 2023-10-11 RX ADMIN — CHLORHEXIDINE GLUCONATE 15 ML: 1.2 RINSE ORAL at 09:53

## 2023-10-11 RX ADMIN — Medication 1.2 MCG/KG/HR: at 14:34

## 2023-10-11 RX ADMIN — ACETAMINOPHEN 650 MG: 325 TABLET ORAL at 20:35

## 2023-10-11 RX ADMIN — ACETAMINOPHEN 650 MG: 325 TABLET ORAL at 02:15

## 2023-10-11 RX ADMIN — SODIUM CHLORIDE: 9 INJECTION, SOLUTION INTRAVENOUS at 04:22

## 2023-10-11 ASSESSMENT — PULMONARY FUNCTION TESTS
PIF_VALUE: 30
PIF_VALUE: 30
PIF_VALUE: 28
PIF_VALUE: 29

## 2023-10-11 ASSESSMENT — PAIN SCALES - GENERAL: PAINLEVEL_OUTOF10: 7
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
## 2023-10-12 ENCOUNTER — ANESTHESIA EVENT (OUTPATIENT)
Dept: OPERATING ROOM | Age: 43
End: 2023-10-12
Payer: COMMERCIAL

## 2023-10-12 ENCOUNTER — APPOINTMENT (OUTPATIENT)
Dept: GENERAL RADIOLOGY | Age: 43
DRG: 710 | End: 2023-10-12
Payer: COMMERCIAL

## 2023-10-12 LAB
ALBUMIN SERPL BCG-MCNC: 2.6 G/DL (ref 3.5–5.1)
ALP SERPL-CCNC: 82 U/L (ref 38–126)
ALT SERPL W/O P-5'-P-CCNC: 31 U/L (ref 11–66)
ANION GAP SERPL CALC-SCNC: 14 MEQ/L (ref 8–16)
APTT PPP: 41 SECONDS (ref 22–38)
AST SERPL-CCNC: 86 U/L (ref 5–40)
BACTERIA SPEC AEROBE CULT: NORMAL
BACTERIA SPEC RESP CULT: NORMAL
BASE EXCESS BLDA CALC-SCNC: -7.6 MMOL/L (ref -2–3)
BILIRUB SERPL-MCNC: 0.7 MG/DL (ref 0.3–1.2)
BUN SERPL-MCNC: 36 MG/DL (ref 7–22)
CALCIUM SERPL-MCNC: 8.1 MG/DL (ref 8.5–10.5)
CHLORIDE SERPL-SCNC: 111 MEQ/L (ref 98–111)
CO2 SERPL-SCNC: 17 MEQ/L (ref 23–33)
COLLECTED BY:: ABNORMAL
CREAT SERPL-MCNC: 0.8 MG/DL (ref 0.4–1.2)
DEPRECATED RDW RBC AUTO: 62.3 FL (ref 35–45)
ERYTHROCYTE [DISTWIDTH] IN BLOOD BY AUTOMATED COUNT: 19.6 % (ref 11.5–14.5)
FIBRINOGEN PPP-MCNC: 848 MG/100ML (ref 155–475)
GFR SERPL CREATININE-BSD FRML MDRD: > 60 ML/MIN/1.73M2
GLUCOSE BLD STRIP.AUTO-MCNC: 103 MG/DL (ref 70–108)
GLUCOSE BLD STRIP.AUTO-MCNC: 105 MG/DL (ref 70–108)
GLUCOSE BLD STRIP.AUTO-MCNC: 106 MG/DL (ref 70–108)
GLUCOSE BLD STRIP.AUTO-MCNC: 122 MG/DL (ref 70–108)
GLUCOSE BLD STRIP.AUTO-MCNC: 141 MG/DL (ref 70–108)
GLUCOSE BLD STRIP.AUTO-MCNC: 152 MG/DL (ref 70–108)
GLUCOSE BLD STRIP.AUTO-MCNC: 160 MG/DL (ref 70–108)
GLUCOSE BLD STRIP.AUTO-MCNC: 61 MG/DL (ref 70–108)
GLUCOSE BLD STRIP.AUTO-MCNC: 62 MG/DL (ref 70–108)
GLUCOSE BLD STRIP.AUTO-MCNC: 69 MG/DL (ref 70–108)
GLUCOSE BLD-MCNC: 170 MG/DL (ref 70–108)
GLUCOSE SERPL-MCNC: 157 MG/DL (ref 70–108)
GRAM STN SPEC: NORMAL
HCO3 BLDA-SCNC: 17 MMOL/L (ref 23–28)
HCT VFR BLD AUTO: 26.1 % (ref 42–52)
HGB BLD-MCNC: 8.6 GM/DL (ref 14–18)
INR PPP: 1.37 (ref 0.85–1.13)
LACTATE SERPL-SCNC: 2.8 MMOL/L (ref 0.5–2)
LACTATE SERPL-SCNC: 3 MMOL/L (ref 0.5–2)
LACTATE SERPL-SCNC: 3.1 MMOL/L (ref 0.5–2)
LACTATE SERPL-SCNC: 3.3 MMOL/L (ref 0.5–2)
LACTATE SERPL-SCNC: 3.4 MMOL/L (ref 0.5–2)
LIPASE SERPL-CCNC: 40 U/L (ref 5.6–51.3)
MCH RBC QN AUTO: 28.8 PG (ref 26–33)
MCHC RBC AUTO-ENTMCNC: 33 GM/DL (ref 32.2–35.5)
MCV RBC AUTO: 87.3 FL (ref 80–94)
PCO2 TEMP ADJ BLDMV: 30 MMHG (ref 41–51)
PH BLDMV: 7.35 [PH] (ref 7.31–7.41)
PLATELET # BLD AUTO: 106 THOU/MM3 (ref 130–400)
PMV BLD AUTO: 11.7 FL (ref 9.4–12.4)
PO2 BLDMV: 38 MMHG (ref 25–40)
POTASSIUM SERPL-SCNC: 3.7 MEQ/L (ref 3.5–5.2)
PROT SERPL-MCNC: 5.3 G/DL (ref 6.1–8)
RBC # BLD AUTO: 2.99 MILL/MM3 (ref 4.7–6.1)
SAO2 % BLDMV: 70 %
SODIUM SERPL-SCNC: 142 MEQ/L (ref 135–145)
WBC # BLD AUTO: 12.9 THOU/MM3 (ref 4.8–10.8)

## 2023-10-12 PROCEDURE — 85610 PROTHROMBIN TIME: CPT

## 2023-10-12 PROCEDURE — 2580000003 HC RX 258: Performed by: EMERGENCY MEDICINE

## 2023-10-12 PROCEDURE — 2500000003 HC RX 250 WO HCPCS: Performed by: INTERNAL MEDICINE

## 2023-10-12 PROCEDURE — 83525 ASSAY OF INSULIN: CPT

## 2023-10-12 PROCEDURE — 2580000003 HC RX 258: Performed by: STUDENT IN AN ORGANIZED HEALTH CARE EDUCATION/TRAINING PROGRAM

## 2023-10-12 PROCEDURE — 2500000003 HC RX 250 WO HCPCS: Performed by: EMERGENCY MEDICINE

## 2023-10-12 PROCEDURE — 80053 COMPREHEN METABOLIC PANEL: CPT

## 2023-10-12 PROCEDURE — 82803 BLOOD GASES ANY COMBINATION: CPT

## 2023-10-12 PROCEDURE — 99291 CRITICAL CARE FIRST HOUR: CPT | Performed by: INTERNAL MEDICINE

## 2023-10-12 PROCEDURE — 36415 COLL VENOUS BLD VENIPUNCTURE: CPT

## 2023-10-12 PROCEDURE — 85027 COMPLETE CBC AUTOMATED: CPT

## 2023-10-12 PROCEDURE — 2580000003 HC RX 258: Performed by: INTERNAL MEDICINE

## 2023-10-12 PROCEDURE — 2000000000 HC ICU R&B

## 2023-10-12 PROCEDURE — 94003 VENT MGMT INPAT SUBQ DAY: CPT

## 2023-10-12 PROCEDURE — 6370000000 HC RX 637 (ALT 250 FOR IP): Performed by: EMERGENCY MEDICINE

## 2023-10-12 PROCEDURE — 6360000002 HC RX W HCPCS: Performed by: INTERNAL MEDICINE

## 2023-10-12 PROCEDURE — 6360000002 HC RX W HCPCS: Performed by: EMERGENCY MEDICINE

## 2023-10-12 PROCEDURE — 83690 ASSAY OF LIPASE: CPT

## 2023-10-12 PROCEDURE — 82947 ASSAY GLUCOSE BLOOD QUANT: CPT

## 2023-10-12 PROCEDURE — 94761 N-INVAS EAR/PLS OXIMETRY MLT: CPT

## 2023-10-12 PROCEDURE — P9047 ALBUMIN (HUMAN), 25%, 50ML: HCPCS | Performed by: STUDENT IN AN ORGANIZED HEALTH CARE EDUCATION/TRAINING PROGRAM

## 2023-10-12 PROCEDURE — 6360000002 HC RX W HCPCS: Performed by: STUDENT IN AN ORGANIZED HEALTH CARE EDUCATION/TRAINING PROGRAM

## 2023-10-12 PROCEDURE — 82948 REAGENT STRIP/BLOOD GLUCOSE: CPT

## 2023-10-12 PROCEDURE — 85384 FIBRINOGEN ACTIVITY: CPT

## 2023-10-12 PROCEDURE — 6370000000 HC RX 637 (ALT 250 FOR IP): Performed by: NURSE PRACTITIONER

## 2023-10-12 PROCEDURE — 71045 X-RAY EXAM CHEST 1 VIEW: CPT

## 2023-10-12 PROCEDURE — 85730 THROMBOPLASTIN TIME PARTIAL: CPT

## 2023-10-12 PROCEDURE — 6370000000 HC RX 637 (ALT 250 FOR IP)

## 2023-10-12 PROCEDURE — 2700000000 HC OXYGEN THERAPY PER DAY

## 2023-10-12 PROCEDURE — 83605 ASSAY OF LACTIC ACID: CPT

## 2023-10-12 PROCEDURE — 6370000000 HC RX 637 (ALT 250 FOR IP): Performed by: INTERNAL MEDICINE

## 2023-10-12 RX ADMIN — SODIUM CHLORIDE, PRESERVATIVE FREE 10 ML: 5 INJECTION INTRAVENOUS at 08:51

## 2023-10-12 RX ADMIN — ALBUMIN (HUMAN) 25 G: 0.25 INJECTION, SOLUTION INTRAVENOUS at 04:31

## 2023-10-12 RX ADMIN — CHLORHEXIDINE GLUCONATE 15 ML: 1.2 RINSE ORAL at 09:05

## 2023-10-12 RX ADMIN — Medication 1.2 MCG/KG/HR: at 06:57

## 2023-10-12 RX ADMIN — SODIUM CHLORIDE, PRESERVATIVE FREE 10 ML: 5 INJECTION INTRAVENOUS at 20:40

## 2023-10-12 RX ADMIN — Medication 1 TABLET: at 08:50

## 2023-10-12 RX ADMIN — DEXTROSE MONOHYDRATE: 100 INJECTION, SOLUTION INTRAVENOUS at 11:55

## 2023-10-12 RX ADMIN — PROPOFOL 15 MCG/KG/MIN: 10 INJECTION, EMULSION INTRAVENOUS at 04:35

## 2023-10-12 RX ADMIN — ALBUMIN (HUMAN) 25 G: 0.25 INJECTION, SOLUTION INTRAVENOUS at 10:45

## 2023-10-12 RX ADMIN — SODIUM CHLORIDE: 9 INJECTION, SOLUTION INTRAVENOUS at 20:31

## 2023-10-12 RX ADMIN — HYDROCODONE BITARTRATE AND ACETAMINOPHEN 2 TABLET: 5; 325 TABLET ORAL at 13:38

## 2023-10-12 RX ADMIN — FOLIC ACID 1 MG: 5 INJECTION, SOLUTION INTRAMUSCULAR; INTRAVENOUS; SUBCUTANEOUS at 08:51

## 2023-10-12 RX ADMIN — CEFTRIAXONE SODIUM 2000 MG: 2 INJECTION, POWDER, FOR SOLUTION INTRAMUSCULAR; INTRAVENOUS at 01:47

## 2023-10-12 RX ADMIN — ACETAMINOPHEN 650 MG: 325 TABLET ORAL at 08:50

## 2023-10-12 RX ADMIN — Medication 1.2 MCG/KG/HR: at 12:25

## 2023-10-12 RX ADMIN — DEXTROSE MONOHYDRATE: 100 INJECTION, SOLUTION INTRAVENOUS at 01:43

## 2023-10-12 RX ADMIN — PHENOBARBITAL SODIUM 130 MG: 65 INJECTION INTRAMUSCULAR at 13:37

## 2023-10-12 RX ADMIN — Medication 1.2 MCG/KG/HR: at 18:00

## 2023-10-12 RX ADMIN — Medication 1.2 MCG/KG/HR: at 01:45

## 2023-10-12 RX ADMIN — THIAMINE HYDROCHLORIDE 100 MG: 100 INJECTION, SOLUTION INTRAMUSCULAR; INTRAVENOUS at 08:50

## 2023-10-12 RX ADMIN — DEXTROSE MONOHYDRATE 125 ML: 100 INJECTION, SOLUTION INTRAVENOUS at 00:32

## 2023-10-12 RX ADMIN — ACETAMINOPHEN 650 MG: 325 TABLET ORAL at 20:40

## 2023-10-12 RX ADMIN — Medication 1.2 MCG/KG/HR: at 23:09

## 2023-10-12 RX ADMIN — LEVETIRACETAM 500 MG: 100 INJECTION, SOLUTION INTRAVENOUS at 10:42

## 2023-10-12 RX ADMIN — LANSOPRAZOLE 30 MG: 30 TABLET, ORALLY DISINTEGRATING, DELAYED RELEASE ORAL at 08:51

## 2023-10-12 RX ADMIN — LEVOTHYROXINE SODIUM 50 MCG: 0.05 TABLET ORAL at 06:16

## 2023-10-12 RX ADMIN — PROPOFOL 20 MCG/KG/MIN: 10 INJECTION, EMULSION INTRAVENOUS at 14:01

## 2023-10-12 RX ADMIN — CHLORHEXIDINE GLUCONATE 15 ML: 1.2 RINSE ORAL at 20:40

## 2023-10-12 ASSESSMENT — PAIN SCALES - GENERAL: PAINLEVEL_OUTOF10: 0

## 2023-10-12 ASSESSMENT — PULMONARY FUNCTION TESTS
PIF_VALUE: 23
PIF_VALUE: 24
PIF_VALUE: 26
PIF_VALUE: 23
PIF_VALUE: 24
PIF_VALUE: 27
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
## 2023-10-13 ENCOUNTER — ANESTHESIA (OUTPATIENT)
Dept: OPERATING ROOM | Age: 43
End: 2023-10-13
Payer: COMMERCIAL

## 2023-10-13 ENCOUNTER — APPOINTMENT (OUTPATIENT)
Dept: GENERAL RADIOLOGY | Age: 43
DRG: 710 | End: 2023-10-13
Payer: COMMERCIAL

## 2023-10-13 ENCOUNTER — APPOINTMENT (OUTPATIENT)
Dept: CT IMAGING | Age: 43
DRG: 710 | End: 2023-10-13
Payer: COMMERCIAL

## 2023-10-13 LAB
ALBUMIN SERPL BCG-MCNC: 2.4 G/DL (ref 3.5–5.1)
ALP SERPL-CCNC: 119 U/L (ref 38–126)
ALT SERPL W/O P-5'-P-CCNC: 41 U/L (ref 11–66)
ANION GAP SERPL CALC-SCNC: 16 MEQ/L (ref 8–16)
AST SERPL-CCNC: 157 U/L (ref 5–40)
BILIRUB SERPL-MCNC: 0.7 MG/DL (ref 0.3–1.2)
BUN SERPL-MCNC: 35 MG/DL (ref 7–22)
CALCIUM SERPL-MCNC: 8.4 MG/DL (ref 8.5–10.5)
CHLORIDE SERPL-SCNC: 111 MEQ/L (ref 98–111)
CO2 SERPL-SCNC: 15 MEQ/L (ref 23–33)
CREAT SERPL-MCNC: 0.8 MG/DL (ref 0.4–1.2)
DEPRECATED RDW RBC AUTO: 62.8 FL (ref 35–45)
ERYTHROCYTE [DISTWIDTH] IN BLOOD BY AUTOMATED COUNT: 19.8 % (ref 11.5–14.5)
GFR SERPL CREATININE-BSD FRML MDRD: > 60 ML/MIN/1.73M2
GLUCOSE BLD STRIP.AUTO-MCNC: 106 MG/DL (ref 70–108)
GLUCOSE BLD STRIP.AUTO-MCNC: 82 MG/DL (ref 70–108)
GLUCOSE BLD STRIP.AUTO-MCNC: 84 MG/DL (ref 70–108)
GLUCOSE BLD STRIP.AUTO-MCNC: 89 MG/DL (ref 70–108)
GLUCOSE SERPL-MCNC: 105 MG/DL (ref 70–108)
HCT VFR BLD AUTO: 25.8 % (ref 42–52)
HGB BLD-MCNC: 8.4 GM/DL (ref 14–18)
INSULIN SERPL-ACNC: 8.3 MU/L
LACTATE SERPL-SCNC: 2.2 MMOL/L (ref 0.5–2)
LACTATE SERPL-SCNC: 2.9 MMOL/L (ref 0.5–2)
LACTATE SERPL-SCNC: 3.8 MMOL/L (ref 0.5–2)
MAGNESIUM SERPL-MCNC: 1.9 MG/DL (ref 1.6–2.4)
MCH RBC QN AUTO: 28.5 PG (ref 26–33)
MCHC RBC AUTO-ENTMCNC: 32.6 GM/DL (ref 32.2–35.5)
MCV RBC AUTO: 87.5 FL (ref 80–94)
PLATELET # BLD AUTO: 124 THOU/MM3 (ref 130–400)
PMV BLD AUTO: 11.7 FL (ref 9.4–12.4)
POTASSIUM SERPL-SCNC: 3.5 MEQ/L (ref 3.5–5.2)
PROT SERPL-MCNC: 5.3 G/DL (ref 6.1–8)
RBC # BLD AUTO: 2.95 MILL/MM3 (ref 4.7–6.1)
SODIUM SERPL-SCNC: 142 MEQ/L (ref 135–145)
WBC # BLD AUTO: 16.6 THOU/MM3 (ref 4.8–10.8)

## 2023-10-13 PROCEDURE — 6360000002 HC RX W HCPCS: Performed by: NURSE ANESTHETIST, CERTIFIED REGISTERED

## 2023-10-13 PROCEDURE — 2500000003 HC RX 250 WO HCPCS: Performed by: EMERGENCY MEDICINE

## 2023-10-13 PROCEDURE — 2709999900 HC NON-CHARGEABLE SUPPLY: Performed by: SURGERY

## 2023-10-13 PROCEDURE — 87186 SC STD MICRODIL/AGAR DIL: CPT

## 2023-10-13 PROCEDURE — 2580000003 HC RX 258: Performed by: INTERNAL MEDICINE

## 2023-10-13 PROCEDURE — 6360000004 HC RX CONTRAST MEDICATION: Performed by: INTERNAL MEDICINE

## 2023-10-13 PROCEDURE — 80053 COMPREHEN METABOLIC PANEL: CPT

## 2023-10-13 PROCEDURE — 3600000002 HC SURGERY LEVEL 2 BASE: Performed by: SURGERY

## 2023-10-13 PROCEDURE — 87147 CULTURE TYPE IMMUNOLOGIC: CPT

## 2023-10-13 PROCEDURE — 83735 ASSAY OF MAGNESIUM: CPT

## 2023-10-13 PROCEDURE — 94003 VENT MGMT INPAT SUBQ DAY: CPT

## 2023-10-13 PROCEDURE — 71045 X-RAY EXAM CHEST 1 VIEW: CPT

## 2023-10-13 PROCEDURE — 2500000003 HC RX 250 WO HCPCS: Performed by: INTERNAL MEDICINE

## 2023-10-13 PROCEDURE — 3700000000 HC ANESTHESIA ATTENDED CARE: Performed by: SURGERY

## 2023-10-13 PROCEDURE — 2500000003 HC RX 250 WO HCPCS: Performed by: NURSE ANESTHETIST, CERTIFIED REGISTERED

## 2023-10-13 PROCEDURE — 3600000012 HC SURGERY LEVEL 2 ADDTL 15MIN: Performed by: SURGERY

## 2023-10-13 PROCEDURE — 11043 DBRDMT MUSC&/FSCA 1ST 20/<: CPT | Performed by: SURGERY

## 2023-10-13 PROCEDURE — 6370000000 HC RX 637 (ALT 250 FOR IP): Performed by: NURSE PRACTITIONER

## 2023-10-13 PROCEDURE — 6370000000 HC RX 637 (ALT 250 FOR IP): Performed by: SURGERY

## 2023-10-13 PROCEDURE — 6360000002 HC RX W HCPCS: Performed by: INTERNAL MEDICINE

## 2023-10-13 PROCEDURE — 99291 CRITICAL CARE FIRST HOUR: CPT | Performed by: INTERNAL MEDICINE

## 2023-10-13 PROCEDURE — 3700000001 HC ADD 15 MINUTES (ANESTHESIA): Performed by: SURGERY

## 2023-10-13 PROCEDURE — 87205 SMEAR GRAM STAIN: CPT

## 2023-10-13 PROCEDURE — 6370000000 HC RX 637 (ALT 250 FOR IP)

## 2023-10-13 PROCEDURE — 11046 DBRDMT MUSC&/FSCA EA ADDL: CPT | Performed by: SURGERY

## 2023-10-13 PROCEDURE — 6370000000 HC RX 637 (ALT 250 FOR IP): Performed by: EMERGENCY MEDICINE

## 2023-10-13 PROCEDURE — 6360000002 HC RX W HCPCS: Performed by: EMERGENCY MEDICINE

## 2023-10-13 PROCEDURE — 82948 REAGENT STRIP/BLOOD GLUCOSE: CPT

## 2023-10-13 PROCEDURE — 83605 ASSAY OF LACTIC ACID: CPT

## 2023-10-13 PROCEDURE — 87070 CULTURE OTHR SPECIMN AEROBIC: CPT

## 2023-10-13 PROCEDURE — 85027 COMPLETE CBC AUTOMATED: CPT

## 2023-10-13 PROCEDURE — 94761 N-INVAS EAR/PLS OXIMETRY MLT: CPT

## 2023-10-13 PROCEDURE — 2580000003 HC RX 258: Performed by: NURSE ANESTHETIST, CERTIFIED REGISTERED

## 2023-10-13 PROCEDURE — 74177 CT ABD & PELVIS W/CONTRAST: CPT

## 2023-10-13 PROCEDURE — 88304 TISSUE EXAM BY PATHOLOGIST: CPT

## 2023-10-13 PROCEDURE — 99232 SBSQ HOSP IP/OBS MODERATE 35: CPT | Performed by: SURGERY

## 2023-10-13 PROCEDURE — 2000000000 HC ICU R&B

## 2023-10-13 PROCEDURE — 45990 SURG DX EXAM ANORECTAL: CPT | Performed by: SURGERY

## 2023-10-13 PROCEDURE — 87075 CULTR BACTERIA EXCEPT BLOOD: CPT

## 2023-10-13 PROCEDURE — 2700000000 HC OXYGEN THERAPY PER DAY

## 2023-10-13 PROCEDURE — 36415 COLL VENOUS BLD VENIPUNCTURE: CPT

## 2023-10-13 PROCEDURE — 87077 CULTURE AEROBIC IDENTIFY: CPT

## 2023-10-13 PROCEDURE — 2580000003 HC RX 258: Performed by: EMERGENCY MEDICINE

## 2023-10-13 RX ORDER — SODIUM HYPOCHLORITE 1.25 MG/ML
SOLUTION TOPICAL DAILY
Status: DISCONTINUED | OUTPATIENT
Start: 2023-10-13 | End: 2023-10-24 | Stop reason: HOSPADM

## 2023-10-13 RX ORDER — PROPOFOL 10 MG/ML
INJECTION, EMULSION INTRAVENOUS PRN
Status: DISCONTINUED | OUTPATIENT
Start: 2023-10-13 | End: 2023-10-13 | Stop reason: SDUPTHER

## 2023-10-13 RX ORDER — SODIUM CHLORIDE 9 MG/ML
INJECTION, SOLUTION INTRAVENOUS CONTINUOUS PRN
Status: DISCONTINUED | OUTPATIENT
Start: 2023-10-13 | End: 2023-10-13 | Stop reason: SDUPTHER

## 2023-10-13 RX ORDER — FENTANYL CITRATE 50 UG/ML
INJECTION, SOLUTION INTRAMUSCULAR; INTRAVENOUS PRN
Status: DISCONTINUED | OUTPATIENT
Start: 2023-10-13 | End: 2023-10-13 | Stop reason: SDUPTHER

## 2023-10-13 RX ORDER — PHENYLEPHRINE HCL IN 0.9% NACL 1 MG/10 ML
SYRINGE (ML) INTRAVENOUS PRN
Status: DISCONTINUED | OUTPATIENT
Start: 2023-10-13 | End: 2023-10-13 | Stop reason: SDUPTHER

## 2023-10-13 RX ORDER — ROCURONIUM BROMIDE 10 MG/ML
INJECTION, SOLUTION INTRAVENOUS PRN
Status: DISCONTINUED | OUTPATIENT
Start: 2023-10-13 | End: 2023-10-13 | Stop reason: SDUPTHER

## 2023-10-13 RX ORDER — ONDANSETRON 2 MG/ML
INJECTION INTRAMUSCULAR; INTRAVENOUS PRN
Status: DISCONTINUED | OUTPATIENT
Start: 2023-10-13 | End: 2023-10-13 | Stop reason: SDUPTHER

## 2023-10-13 RX ADMIN — LEVETIRACETAM 500 MG: 100 INJECTION, SOLUTION INTRAVENOUS at 11:24

## 2023-10-13 RX ADMIN — THIAMINE HYDROCHLORIDE 100 MG: 100 INJECTION, SOLUTION INTRAMUSCULAR; INTRAVENOUS at 10:24

## 2023-10-13 RX ADMIN — CHLORHEXIDINE GLUCONATE 15 ML: 1.2 RINSE ORAL at 10:21

## 2023-10-13 RX ADMIN — Medication 1.3 MCG/KG/HR: at 10:20

## 2023-10-13 RX ADMIN — PROPOFOL 25 MCG/KG/MIN: 10 INJECTION, EMULSION INTRAVENOUS at 18:06

## 2023-10-13 RX ADMIN — SODIUM CHLORIDE: 9 INJECTION, SOLUTION INTRAVENOUS at 08:43

## 2023-10-13 RX ADMIN — ROCURONIUM BROMIDE 30 MG: 10 INJECTION INTRAVENOUS at 09:17

## 2023-10-13 RX ADMIN — FENTANYL CITRATE 50 MCG: 50 INJECTION, SOLUTION INTRAMUSCULAR; INTRAVENOUS at 09:07

## 2023-10-13 RX ADMIN — LEVETIRACETAM 500 MG: 100 INJECTION, SOLUTION INTRAVENOUS at 00:04

## 2023-10-13 RX ADMIN — FOLIC ACID 1 MG: 5 INJECTION, SOLUTION INTRAMUSCULAR; INTRAVENOUS; SUBCUTANEOUS at 10:31

## 2023-10-13 RX ADMIN — Medication 12 MCG/MIN: at 22:15

## 2023-10-13 RX ADMIN — IOPAMIDOL 80 ML: 755 INJECTION, SOLUTION INTRAVENOUS at 10:55

## 2023-10-13 RX ADMIN — PROPOFOL 50 MG: 10 INJECTION, EMULSION INTRAVENOUS at 08:53

## 2023-10-13 RX ADMIN — SODIUM CHLORIDE, PRESERVATIVE FREE 10 ML: 5 INJECTION INTRAVENOUS at 10:24

## 2023-10-13 RX ADMIN — Medication 1.3 MCG/KG/HR: at 15:46

## 2023-10-13 RX ADMIN — Medication 1 TABLET: at 10:24

## 2023-10-13 RX ADMIN — Medication 1.3 MCG/KG/HR: at 20:46

## 2023-10-13 RX ADMIN — LEVETIRACETAM 500 MG: 100 INJECTION, SOLUTION INTRAVENOUS at 23:18

## 2023-10-13 RX ADMIN — ONDANSETRON 4 MG: 2 INJECTION INTRAMUSCULAR; INTRAVENOUS at 09:06

## 2023-10-13 RX ADMIN — DAKIN'S SOLUTION 0.125% (QUARTER STRENGTH): 0.12 SOLUTION at 23:20

## 2023-10-13 RX ADMIN — Medication 100 MCG: at 09:53

## 2023-10-13 RX ADMIN — CHLORHEXIDINE GLUCONATE 15 ML: 1.2 RINSE ORAL at 20:30

## 2023-10-13 RX ADMIN — PIPERACILLIN AND TAZOBACTAM 3375 MG: 3; .375 INJECTION, POWDER, LYOPHILIZED, FOR SOLUTION INTRAVENOUS at 12:35

## 2023-10-13 RX ADMIN — ROCURONIUM BROMIDE 50 MG: 10 INJECTION INTRAVENOUS at 08:51

## 2023-10-13 RX ADMIN — ACETAMINOPHEN 650 MG: 325 TABLET ORAL at 03:36

## 2023-10-13 RX ADMIN — LEVOTHYROXINE SODIUM 50 MCG: 0.05 TABLET ORAL at 06:12

## 2023-10-13 RX ADMIN — PROPOFOL 20 MCG/KG/MIN: 10 INJECTION, EMULSION INTRAVENOUS at 03:29

## 2023-10-13 RX ADMIN — CEFTRIAXONE SODIUM 2000 MG: 2 INJECTION, POWDER, FOR SOLUTION INTRAMUSCULAR; INTRAVENOUS at 02:23

## 2023-10-13 RX ADMIN — MORPHINE SULFATE 2 MG: 2 INJECTION, SOLUTION INTRAMUSCULAR; INTRAVENOUS at 04:35

## 2023-10-13 RX ADMIN — Medication 9 MCG/MIN: at 00:57

## 2023-10-13 RX ADMIN — PIPERACILLIN AND TAZOBACTAM 3375 MG: 3; .375 INJECTION, POWDER, LYOPHILIZED, FOR SOLUTION INTRAVENOUS at 23:19

## 2023-10-13 RX ADMIN — Medication 1.3 MCG/KG/HR: at 04:41

## 2023-10-13 RX ADMIN — LANSOPRAZOLE 30 MG: 30 TABLET, ORALLY DISINTEGRATING, DELAYED RELEASE ORAL at 10:24

## 2023-10-13 ASSESSMENT — PULMONARY FUNCTION TESTS
PIF_VALUE: 23
PIF_VALUE: 23
PIF_VALUE: 27
PIF_VALUE: 27
PIF_VALUE: 28
PIF_VALUE: 21

## 2023-10-13 ASSESSMENT — PAIN SCALES - GENERAL
PAINLEVEL_OUTOF10: 0
PAINLEVEL_OUTOF10: 0

## 2023-10-13 NOTE — ANESTHESIA POSTPROCEDURE EVALUATION
Department of Anesthesiology  Postprocedure Note    Patient: Génesis Hazel  MRN: [id number]  YOB: 1980  Date of evaluation: 10/13/2023      Procedure Summary     Date: 10/13/23 Room / Location: [address]    Anesthesia Start: 0840 Anesthesia Stop: 1297    Procedure: I&D Perirectal Abscess and Right Thigh (Right: Buttocks) Diagnosis:       Abscess      (Abscess [L02.91])    Surgeons: Marin Chauhan MD Responsible Provider: Antoine Page DO    Anesthesia Type: general ASA Status: 4          Anesthesia Type: No value filed.     Angeli Phase I:      Angeli Phase II:        Anesthesia Post Evaluation    Patient location during evaluation: ICU  Patient participation: complete - patient cannot participate  Level of consciousness: sedated and ventilated  Airway patency: patent  Nausea & Vomiting: no nausea and no vomiting  Complications: no  Cardiovascular status: vasoactive/inotropes  Respiratory status: ventilator and intubated  Hydration status: stable  Multimodal analgesia pain management approach

## 2023-10-14 ENCOUNTER — APPOINTMENT (OUTPATIENT)
Dept: GENERAL RADIOLOGY | Age: 43
DRG: 710 | End: 2023-10-14
Payer: COMMERCIAL

## 2023-10-14 LAB
6-MONOACETYLMORPHINE (6-MAM) [PRESENCE] IN SERUM OR PLASMA: NOT DETECTED NG/ML
7-AMINOCLONAZEPAM [PRESENCE] IN SERUM OR PLASMA: NOT DETECTED NG/ML
ABO: NORMAL
ALBUMIN SERPL BCG-MCNC: 2 G/DL (ref 3.5–5.1)
ALP SERPL-CCNC: 137 U/L (ref 38–126)
ALPHA HYDROXYALPRAZOLAM [PRESENCE] IN SERUM OR PLASMA: NOT DETECTED NG/ML
ALPRAZOLAM [PRESENCE] IN SERUM OR PLASMA: NOT DETECTED NG/ML
ALT SERPL W/O P-5'-P-CCNC: 41 U/L (ref 11–66)
ANION GAP SERPL CALC-SCNC: 14 MEQ/L (ref 8–16)
ANTIBODY SCREEN: NORMAL
ANTICOAGULANTS, SERUM/PLASMA: NOT DETECTED
ANTICONVULSANTS, SERUM/PLASMA: NOT DETECTED
ANTIDEPRESSANTS, SERUM/PLASMA: NOT DETECTED
ANTIDIABETICS, SERUM/PLASMA: NOT DETECTED
ANTIHISTAMINES, SERUM/PLASMA: NOT DETECTED
ANTIPSYCHOTICS, SERUM/PLASMA: NOT DETECTED
APAP SERPL QL: NOT DETECTED
AST SERPL-CCNC: 107 U/L (ref 5–40)
BACTERIA BLD AEROBE CULT: NORMAL
BACTERIA BLD AEROBE CULT: NORMAL
BENZODIAZEPINES, SERUM/PLASMA: PRESENT
BILIRUB SERPL-MCNC: 0.4 MG/DL (ref 0.3–1.2)
BUN SERPL-MCNC: 31 MG/DL (ref 7–22)
CA-I BLD ISE-SCNC: 1.09 MMOL/L (ref 1.12–1.32)
CA-I BLD ISE-SCNC: 1.12 MMOL/L (ref 1.12–1.32)
CALCIUM SERPL-MCNC: 7.7 MG/DL (ref 8.5–10.5)
CARDIAC, SERUM/PLASMA: NOT DETECTED
CHLORDIAZEPOXIDE [PRESENCE] IN SERUM OR PLASMA: NOT DETECTED NG/ML
CHLORIDE SERPL-SCNC: 112 MEQ/L (ref 98–111)
CLONAZEPAM [PRESENCE] IN SERUM OR PLASMA: NOT DETECTED NG/ML
CO2 SERPL-SCNC: 17 MEQ/L (ref 23–33)
COMPREHENSIVE DRUG PROMPT: NORMAL
COTININE SERPL QL: PRESENT
COTININE SERPL QL: PRESENT NG/ML
CREAT SERPL-MCNC: 0.6 MG/DL (ref 0.4–1.2)
D-METHORPHAN SERPL QL: NOT DETECTED
DEPRECATED RDW RBC AUTO: 62.2 FL (ref 35–45)
DIAZEPAM [PRESENCE] IN SERUM OR PLASMA: NOT DETECTED NG/ML
EDDP SERPL QL: NOT DETECTED NG/ML
ERYTHROCYTE [DISTWIDTH] IN BLOOD BY AUTOMATED COUNT: 20 % (ref 11.5–14.5)
FENTANYL [PRESENCE] IN SERUM OR PLASMA: NOT DETECTED NG/ML
GFR SERPL CREATININE-BSD FRML MDRD: > 60 ML/MIN/1.73M2
GLUCOSE BLD STRIP.AUTO-MCNC: 81 MG/DL (ref 70–108)
GLUCOSE SERPL-MCNC: 150 MG/DL (ref 70–108)
HCT VFR BLD AUTO: 21.3 % (ref 42–52)
HCT VFR BLD AUTO: 26.5 % (ref 42–52)
HGB BLD-MCNC: 7.2 GM/DL (ref 14–18)
HGB BLD-MCNC: 9.1 GM/DL (ref 14–18)
HYDROCODONE [PRESENCE] IN SERUM OR PLASMA: PRESENT NG/ML
HYDROMORPHONE [PRESENCE] IN SERUM OR PLASMA: NOT DETECTED NG/ML
LACTATE SERPL-SCNC: 1.8 MMOL/L (ref 0.5–2)
LACTATE SERPL-SCNC: 1.8 MMOL/L (ref 0.5–2)
LACTATE SERPL-SCNC: 2.1 MMOL/L (ref 0.5–2)
LORAZEPAM [PRESENCE] IN SERUM OR PLASMA: PRESENT NG/ML
Lab: NOT DETECTED NG/ML
MCH RBC QN AUTO: 29.1 PG (ref 26–33)
MCHC RBC AUTO-ENTMCNC: 33.8 GM/DL (ref 32.2–35.5)
MCV RBC AUTO: 86.2 FL (ref 80–94)
MEPERIDINE [PRESENCE] IN SERUM OR PLASMA: NOT DETECTED NG/ML
METHADONE SERPL QL: NOT DETECTED NG/ML
MORPHINE [PRESENCE] IN SERUM OR PLASMA: NOT DETECTED NG/ML
MUSCLE RELAXANTS, SERUM/PLASMA: NOT DETECTED
NORDIAZEPAM [PRESENCE] IN SERUM OR PLASMA: NOT DETECTED NG/ML
NORFENTANYL [PRESENCE] IN SERUM OR PLASMA: NOT DETECTED NG/ML
NORMEPERIDINE [PRESENCE] IN SERUM OR PLASMA: NOT DETECTED NG/ML
OPIOIDS, SERUM/PLASMA: PRESENT
OXAZEPAM [PRESENCE] IN SERUM OR PLASMA: NOT DETECTED NG/ML
OXYCODONE SERPL QL: NOT DETECTED NG/ML
OXYMORPHONE [PRESENCE] IN SERUM OR PLASMA: NOT DETECTED NG/ML
PLATELET # BLD AUTO: 138 THOU/MM3 (ref 130–400)
PMV BLD AUTO: 12.1 FL (ref 9.4–12.4)
POTASSIUM SERPL-SCNC: 3.6 MEQ/L (ref 3.5–5.2)
PROT SERPL-MCNC: 5.1 G/DL (ref 6.1–8)
RBC # BLD AUTO: 2.47 MILL/MM3 (ref 4.7–6.1)
RH FACTOR: NORMAL
SEDATIVE-HYPNOTICS, SERUM/PLASMA: NOT DETECTED
SODIUM SERPL-SCNC: 143 MEQ/L (ref 135–145)
STIMULANTS, SERUM/PLASMA: NOT DETECTED
T3FREE SERPL-MCNC: 0.42 PG/ML (ref 2.02–4.43)
T4 FREE SERPL-MCNC: 0.57 NG/DL (ref 0.93–1.76)
TEMAZEPAM [PRESENCE] IN SERUM OR PLASMA: NOT DETECTED NG/ML
TRIGL SERPL-MCNC: 196 MG/DL (ref 0–199)
TSH SERPL DL<=0.005 MIU/L-ACNC: 4.36 UIU/ML (ref 0.4–4.2)
WBC # BLD AUTO: 16.7 THOU/MM3 (ref 4.8–10.8)

## 2023-10-14 PROCEDURE — 2700000000 HC OXYGEN THERAPY PER DAY

## 2023-10-14 PROCEDURE — 6360000002 HC RX W HCPCS: Performed by: EMERGENCY MEDICINE

## 2023-10-14 PROCEDURE — 86923 COMPATIBILITY TEST ELECTRIC: CPT

## 2023-10-14 PROCEDURE — 80053 COMPREHEN METABOLIC PANEL: CPT

## 2023-10-14 PROCEDURE — 30233N1 TRANSFUSION OF NONAUTOLOGOUS RED BLOOD CELLS INTO PERIPHERAL VEIN, PERCUTANEOUS APPROACH: ICD-10-PCS | Performed by: SURGERY

## 2023-10-14 PROCEDURE — 6370000000 HC RX 637 (ALT 250 FOR IP)

## 2023-10-14 PROCEDURE — 2500000003 HC RX 250 WO HCPCS: Performed by: EMERGENCY MEDICINE

## 2023-10-14 PROCEDURE — 86900 BLOOD TYPING SEROLOGIC ABO: CPT

## 2023-10-14 PROCEDURE — 82330 ASSAY OF CALCIUM: CPT

## 2023-10-14 PROCEDURE — 85018 HEMOGLOBIN: CPT

## 2023-10-14 PROCEDURE — 36430 TRANSFUSION BLD/BLD COMPNT: CPT

## 2023-10-14 PROCEDURE — 84439 ASSAY OF FREE THYROXINE: CPT

## 2023-10-14 PROCEDURE — 85014 HEMATOCRIT: CPT

## 2023-10-14 PROCEDURE — 2500000003 HC RX 250 WO HCPCS: Performed by: INTERNAL MEDICINE

## 2023-10-14 PROCEDURE — 71045 X-RAY EXAM CHEST 1 VIEW: CPT

## 2023-10-14 PROCEDURE — 2580000003 HC RX 258: Performed by: EMERGENCY MEDICINE

## 2023-10-14 PROCEDURE — 6370000000 HC RX 637 (ALT 250 FOR IP): Performed by: EMERGENCY MEDICINE

## 2023-10-14 PROCEDURE — 6370000000 HC RX 637 (ALT 250 FOR IP): Performed by: NURSE PRACTITIONER

## 2023-10-14 PROCEDURE — 99291 CRITICAL CARE FIRST HOUR: CPT | Performed by: INTERNAL MEDICINE

## 2023-10-14 PROCEDURE — 2000000000 HC ICU R&B

## 2023-10-14 PROCEDURE — 82948 REAGENT STRIP/BLOOD GLUCOSE: CPT

## 2023-10-14 PROCEDURE — P9016 RBC LEUKOCYTES REDUCED: HCPCS

## 2023-10-14 PROCEDURE — 83605 ASSAY OF LACTIC ACID: CPT

## 2023-10-14 PROCEDURE — 86901 BLOOD TYPING SEROLOGIC RH(D): CPT

## 2023-10-14 PROCEDURE — 85027 COMPLETE CBC AUTOMATED: CPT

## 2023-10-14 PROCEDURE — 6360000002 HC RX W HCPCS: Performed by: INTERNAL MEDICINE

## 2023-10-14 PROCEDURE — 94003 VENT MGMT INPAT SUBQ DAY: CPT

## 2023-10-14 PROCEDURE — 84478 ASSAY OF TRIGLYCERIDES: CPT

## 2023-10-14 PROCEDURE — 94761 N-INVAS EAR/PLS OXIMETRY MLT: CPT

## 2023-10-14 PROCEDURE — 84443 ASSAY THYROID STIM HORMONE: CPT

## 2023-10-14 PROCEDURE — 86850 RBC ANTIBODY SCREEN: CPT

## 2023-10-14 PROCEDURE — 36415 COLL VENOUS BLD VENIPUNCTURE: CPT

## 2023-10-14 RX ORDER — SODIUM CHLORIDE 9 MG/ML
INJECTION, SOLUTION INTRAVENOUS PRN
Status: DISCONTINUED | OUTPATIENT
Start: 2023-10-14 | End: 2023-10-22

## 2023-10-14 RX ORDER — CALCIUM GLUCONATE 20 MG/ML
2000 INJECTION, SOLUTION INTRAVENOUS ONCE
Status: COMPLETED | OUTPATIENT
Start: 2023-10-14 | End: 2023-10-14

## 2023-10-14 RX ADMIN — MORPHINE SULFATE 2 MG: 2 INJECTION, SOLUTION INTRAMUSCULAR; INTRAVENOUS at 04:10

## 2023-10-14 RX ADMIN — CALCIUM GLUCONATE 2000 MG: 20 INJECTION, SOLUTION INTRAVENOUS at 14:06

## 2023-10-14 RX ADMIN — Medication 1.2 MCG/KG/HR: at 02:22

## 2023-10-14 RX ADMIN — Medication 1.3 MCG/KG/HR: at 12:26

## 2023-10-14 RX ADMIN — FOLIC ACID 1 MG: 5 INJECTION, SOLUTION INTRAMUSCULAR; INTRAVENOUS; SUBCUTANEOUS at 09:38

## 2023-10-14 RX ADMIN — LANSOPRAZOLE 30 MG: 30 TABLET, ORALLY DISINTEGRATING, DELAYED RELEASE ORAL at 09:25

## 2023-10-14 RX ADMIN — Medication 1.3 MCG/KG/HR: at 07:41

## 2023-10-14 RX ADMIN — LEVETIRACETAM 500 MG: 100 INJECTION, SOLUTION INTRAVENOUS at 22:54

## 2023-10-14 RX ADMIN — LEVOTHYROXINE SODIUM 50 MCG: 0.05 TABLET ORAL at 09:25

## 2023-10-14 RX ADMIN — DAKIN'S SOLUTION 0.125% (QUARTER STRENGTH): 0.12 SOLUTION at 14:00

## 2023-10-14 RX ADMIN — PIPERACILLIN AND TAZOBACTAM 3375 MG: 3; .375 INJECTION, POWDER, LYOPHILIZED, FOR SOLUTION INTRAVENOUS at 04:28

## 2023-10-14 RX ADMIN — PROPOFOL 30 MCG/KG/MIN: 10 INJECTION, EMULSION INTRAVENOUS at 11:41

## 2023-10-14 RX ADMIN — PIPERACILLIN AND TAZOBACTAM 3375 MG: 3; .375 INJECTION, POWDER, LYOPHILIZED, FOR SOLUTION INTRAVENOUS at 21:41

## 2023-10-14 RX ADMIN — CHLORHEXIDINE GLUCONATE 15 ML: 1.2 RINSE ORAL at 09:22

## 2023-10-14 RX ADMIN — PIPERACILLIN AND TAZOBACTAM 3375 MG: 3; .375 INJECTION, POWDER, LYOPHILIZED, FOR SOLUTION INTRAVENOUS at 12:51

## 2023-10-14 RX ADMIN — THIAMINE HYDROCHLORIDE 100 MG: 100 INJECTION, SOLUTION INTRAMUSCULAR; INTRAVENOUS at 09:27

## 2023-10-14 RX ADMIN — ACETAMINOPHEN 650 MG: 325 TABLET ORAL at 09:55

## 2023-10-14 RX ADMIN — SODIUM CHLORIDE: 9 INJECTION, SOLUTION INTRAVENOUS at 17:49

## 2023-10-14 RX ADMIN — Medication 1 TABLET: at 09:25

## 2023-10-14 RX ADMIN — CHLORHEXIDINE GLUCONATE 15 ML: 1.2 RINSE ORAL at 21:39

## 2023-10-14 RX ADMIN — LEVETIRACETAM 500 MG: 100 INJECTION, SOLUTION INTRAVENOUS at 12:42

## 2023-10-14 RX ADMIN — Medication 1.5 MCG/KG/HR: at 22:57

## 2023-10-14 RX ADMIN — ACETAMINOPHEN 650 MG: 325 TABLET ORAL at 05:23

## 2023-10-14 RX ADMIN — MORPHINE SULFATE 2 MG: 2 INJECTION, SOLUTION INTRAMUSCULAR; INTRAVENOUS at 21:17

## 2023-10-14 RX ADMIN — PROPOFOL 25 MCG/KG/MIN: 10 INJECTION, EMULSION INTRAVENOUS at 04:31

## 2023-10-14 RX ADMIN — Medication 1.3 MCG/KG/HR: at 17:50

## 2023-10-14 RX ADMIN — MORPHINE SULFATE 2 MG: 2 INJECTION, SOLUTION INTRAMUSCULAR; INTRAVENOUS at 13:29

## 2023-10-14 RX ADMIN — SODIUM CHLORIDE, PRESERVATIVE FREE 10 ML: 5 INJECTION INTRAVENOUS at 09:23

## 2023-10-14 RX ADMIN — SODIUM CHLORIDE, PRESERVATIVE FREE 10 ML: 5 INJECTION INTRAVENOUS at 09:00

## 2023-10-14 RX ADMIN — PROPOFOL 35 MCG/KG/MIN: 10 INJECTION, EMULSION INTRAVENOUS at 20:43

## 2023-10-14 ASSESSMENT — PULMONARY FUNCTION TESTS
PIF_VALUE: 22
PIF_VALUE: 18
PIF_VALUE: 20
PIF_VALUE: 20
PIF_VALUE: 24
PIF_VALUE: 22

## 2023-10-14 ASSESSMENT — PAIN SCALES - GENERAL: PAINLEVEL_OUTOF10: 10

## 2023-10-15 ENCOUNTER — APPOINTMENT (OUTPATIENT)
Dept: GENERAL RADIOLOGY | Age: 43
DRG: 710 | End: 2023-10-15
Payer: COMMERCIAL

## 2023-10-15 LAB
ANION GAP SERPL CALC-SCNC: 13 MEQ/L (ref 8–16)
BASOPHILS ABSOLUTE: 0 THOU/MM3 (ref 0–0.1)
BASOPHILS ABSOLUTE: 0 THOU/MM3 (ref 0–0.1)
BASOPHILS NFR BLD AUTO: 0.2 %
BASOPHILS NFR BLD AUTO: 0.2 %
BUN SERPL-MCNC: 21 MG/DL (ref 7–22)
CA-I BLD ISE-SCNC: 1.18 MMOL/L (ref 1.12–1.32)
CALCIUM SERPL-MCNC: 7.3 MG/DL (ref 8.5–10.5)
CHLORIDE SERPL-SCNC: 114 MEQ/L (ref 98–111)
CO2 SERPL-SCNC: 17 MEQ/L (ref 23–33)
CREAT SERPL-MCNC: 0.4 MG/DL (ref 0.4–1.2)
DEPRECATED RDW RBC AUTO: 56.8 FL (ref 35–45)
DEPRECATED RDW RBC AUTO: 57.1 FL (ref 35–45)
DEPRECATED RDW RBC AUTO: 57.9 FL (ref 35–45)
EOSINOPHIL NFR BLD AUTO: 0.5 %
EOSINOPHIL NFR BLD AUTO: 0.5 %
EOSINOPHILS ABSOLUTE: 0.1 THOU/MM3 (ref 0–0.4)
EOSINOPHILS ABSOLUTE: 0.1 THOU/MM3 (ref 0–0.4)
ERYTHROCYTE [DISTWIDTH] IN BLOOD BY AUTOMATED COUNT: 18.2 % (ref 11.5–14.5)
GFR SERPL CREATININE-BSD FRML MDRD: > 60 ML/MIN/1.73M2
GLUCOSE BLD STRIP.AUTO-MCNC: 116 MG/DL (ref 70–108)
GLUCOSE SERPL-MCNC: 112 MG/DL (ref 70–108)
HCT VFR BLD AUTO: 24.1 % (ref 42–52)
HCT VFR BLD AUTO: 25.3 % (ref 42–52)
HCT VFR BLD AUTO: 25.3 % (ref 42–52)
HGB BLD-MCNC: 8 GM/DL (ref 14–18)
HGB BLD-MCNC: 8.5 GM/DL (ref 14–18)
HGB BLD-MCNC: 8.6 GM/DL (ref 14–18)
IMM GRANULOCYTES # BLD AUTO: 0.14 THOU/MM3 (ref 0–0.07)
IMM GRANULOCYTES # BLD AUTO: 0.14 THOU/MM3 (ref 0–0.07)
IMM GRANULOCYTES NFR BLD AUTO: 1.1 %
IMM GRANULOCYTES NFR BLD AUTO: 1.2 %
LACTATE SERPL-SCNC: 1.3 MMOL/L (ref 0.5–2)
LYMPHOCYTES ABSOLUTE: 1.1 THOU/MM3 (ref 1–4.8)
LYMPHOCYTES ABSOLUTE: 1.1 THOU/MM3 (ref 1–4.8)
LYMPHOCYTES NFR BLD AUTO: 8.1 %
LYMPHOCYTES NFR BLD AUTO: 9.3 %
MCH RBC QN AUTO: 28.8 PG (ref 26–33)
MCH RBC QN AUTO: 28.9 PG (ref 26–33)
MCH RBC QN AUTO: 29.4 PG (ref 26–33)
MCHC RBC AUTO-ENTMCNC: 33.2 GM/DL (ref 32.2–35.5)
MCHC RBC AUTO-ENTMCNC: 33.6 GM/DL (ref 32.2–35.5)
MCHC RBC AUTO-ENTMCNC: 34 GM/DL (ref 32.2–35.5)
MCV RBC AUTO: 85.8 FL (ref 80–94)
MCV RBC AUTO: 86.3 FL (ref 80–94)
MCV RBC AUTO: 87 FL (ref 80–94)
MONOCYTES ABSOLUTE: 0.4 THOU/MM3 (ref 0.4–1.3)
MONOCYTES ABSOLUTE: 0.4 THOU/MM3 (ref 0.4–1.3)
MONOCYTES NFR BLD AUTO: 3.1 %
MONOCYTES NFR BLD AUTO: 3.4 %
NEUTROPHILS NFR BLD AUTO: 85.4 %
NEUTROPHILS NFR BLD AUTO: 87 %
NRBC BLD AUTO-RTO: 0 /100 WBC
NRBC BLD AUTO-RTO: 0 /100 WBC
PLATELET # BLD AUTO: 119 THOU/MM3 (ref 130–400)
PLATELET # BLD AUTO: 124 THOU/MM3 (ref 130–400)
PLATELET # BLD AUTO: 129 THOU/MM3 (ref 130–400)
PLATELET BLD QL SMEAR: ABNORMAL
PMV BLD AUTO: 11 FL (ref 9.4–12.4)
PMV BLD AUTO: 11.1 FL (ref 9.4–12.4)
PMV BLD AUTO: 11.2 FL (ref 9.4–12.4)
POTASSIUM SERPL-SCNC: 3.5 MEQ/L (ref 3.5–5.2)
RBC # BLD AUTO: 2.77 MILL/MM3 (ref 4.7–6.1)
RBC # BLD AUTO: 2.93 MILL/MM3 (ref 4.7–6.1)
RBC # BLD AUTO: 2.95 MILL/MM3 (ref 4.7–6.1)
SCAN OF BLOOD SMEAR: NORMAL
SEGMENTED NEUTROPHILS ABSOLUTE COUNT: 10 THOU/MM3 (ref 1.8–7.7)
SEGMENTED NEUTROPHILS ABSOLUTE COUNT: 11.3 THOU/MM3 (ref 1.8–7.7)
SODIUM SERPL-SCNC: 144 MEQ/L (ref 135–145)
TARGETS BLD QL SMEAR: ABNORMAL
WBC # BLD AUTO: 11.7 THOU/MM3 (ref 4.8–10.8)
WBC # BLD AUTO: 13 THOU/MM3 (ref 4.8–10.8)
WBC # BLD AUTO: 14 THOU/MM3 (ref 4.8–10.8)

## 2023-10-15 PROCEDURE — 6370000000 HC RX 637 (ALT 250 FOR IP): Performed by: EMERGENCY MEDICINE

## 2023-10-15 PROCEDURE — 2000000000 HC ICU R&B

## 2023-10-15 PROCEDURE — 85025 COMPLETE CBC W/AUTO DIFF WBC: CPT

## 2023-10-15 PROCEDURE — 6370000000 HC RX 637 (ALT 250 FOR IP): Performed by: NURSE PRACTITIONER

## 2023-10-15 PROCEDURE — 2500000003 HC RX 250 WO HCPCS: Performed by: EMERGENCY MEDICINE

## 2023-10-15 PROCEDURE — 94761 N-INVAS EAR/PLS OXIMETRY MLT: CPT

## 2023-10-15 PROCEDURE — 2700000000 HC OXYGEN THERAPY PER DAY

## 2023-10-15 PROCEDURE — 2500000003 HC RX 250 WO HCPCS: Performed by: INTERNAL MEDICINE

## 2023-10-15 PROCEDURE — 83605 ASSAY OF LACTIC ACID: CPT

## 2023-10-15 PROCEDURE — 2500000003 HC RX 250 WO HCPCS

## 2023-10-15 PROCEDURE — 6370000000 HC RX 637 (ALT 250 FOR IP): Performed by: INTERNAL MEDICINE

## 2023-10-15 PROCEDURE — 80048 BASIC METABOLIC PNL TOTAL CA: CPT

## 2023-10-15 PROCEDURE — 94003 VENT MGMT INPAT SUBQ DAY: CPT

## 2023-10-15 PROCEDURE — 6370000000 HC RX 637 (ALT 250 FOR IP)

## 2023-10-15 PROCEDURE — 6360000002 HC RX W HCPCS: Performed by: EMERGENCY MEDICINE

## 2023-10-15 PROCEDURE — 6360000002 HC RX W HCPCS: Performed by: INTERNAL MEDICINE

## 2023-10-15 PROCEDURE — 85027 COMPLETE CBC AUTOMATED: CPT

## 2023-10-15 PROCEDURE — 71045 X-RAY EXAM CHEST 1 VIEW: CPT

## 2023-10-15 PROCEDURE — 99291 CRITICAL CARE FIRST HOUR: CPT | Performed by: INTERNAL MEDICINE

## 2023-10-15 PROCEDURE — 82330 ASSAY OF CALCIUM: CPT

## 2023-10-15 PROCEDURE — 82948 REAGENT STRIP/BLOOD GLUCOSE: CPT

## 2023-10-15 PROCEDURE — 36415 COLL VENOUS BLD VENIPUNCTURE: CPT

## 2023-10-15 PROCEDURE — 2580000003 HC RX 258: Performed by: EMERGENCY MEDICINE

## 2023-10-15 PROCEDURE — 6360000002 HC RX W HCPCS: Performed by: NURSE PRACTITIONER

## 2023-10-15 PROCEDURE — 6370000000 HC RX 637 (ALT 250 FOR IP): Performed by: STUDENT IN AN ORGANIZED HEALTH CARE EDUCATION/TRAINING PROGRAM

## 2023-10-15 RX ORDER — FENTANYL CITRATE-0.9 % NACL/PF 10 MCG/ML
PLASTIC BAG, INJECTION (ML) INTRAVENOUS
Status: COMPLETED
Start: 2023-10-15 | End: 2023-10-15

## 2023-10-15 RX ORDER — FENTANYL CITRATE-0.9 % NACL/PF 10 MCG/ML
25-200 PLASTIC BAG, INJECTION (ML) INTRAVENOUS CONTINUOUS
Status: DISCONTINUED | OUTPATIENT
Start: 2023-10-15 | End: 2023-10-17

## 2023-10-15 RX ORDER — POTASSIUM CHLORIDE 7.45 MG/ML
10 INJECTION INTRAVENOUS PRN
Status: DISCONTINUED | OUTPATIENT
Start: 2023-10-15 | End: 2023-10-24 | Stop reason: HOSPADM

## 2023-10-15 RX ORDER — CALCIUM GLUCONATE 20 MG/ML
2000 INJECTION, SOLUTION INTRAVENOUS ONCE
Status: COMPLETED | OUTPATIENT
Start: 2023-10-15 | End: 2023-10-15

## 2023-10-15 RX ORDER — IBUPROFEN 200 MG
800 TABLET ORAL ONCE
Status: COMPLETED | OUTPATIENT
Start: 2023-10-15 | End: 2023-10-15

## 2023-10-15 RX ORDER — POTASSIUM CHLORIDE 20 MEQ/1
40 TABLET, EXTENDED RELEASE ORAL PRN
Status: DISCONTINUED | OUTPATIENT
Start: 2023-10-15 | End: 2023-10-24 | Stop reason: HOSPADM

## 2023-10-15 RX ORDER — FENTANYL CITRATE 50 UG/ML
50 INJECTION, SOLUTION INTRAMUSCULAR; INTRAVENOUS ONCE
Status: COMPLETED | OUTPATIENT
Start: 2023-10-15 | End: 2023-10-15

## 2023-10-15 RX ADMIN — HYDROCODONE BITARTRATE AND ACETAMINOPHEN 2 TABLET: 5; 325 TABLET ORAL at 00:56

## 2023-10-15 RX ADMIN — ACETAMINOPHEN 650 MG: 325 TABLET ORAL at 13:28

## 2023-10-15 RX ADMIN — MORPHINE SULFATE 2 MG: 2 INJECTION, SOLUTION INTRAMUSCULAR; INTRAVENOUS at 05:30

## 2023-10-15 RX ADMIN — PIPERACILLIN AND TAZOBACTAM 3375 MG: 3; .375 INJECTION, POWDER, LYOPHILIZED, FOR SOLUTION INTRAVENOUS at 13:34

## 2023-10-15 RX ADMIN — PROPOFOL 50 MCG/KG/MIN: 10 INJECTION, EMULSION INTRAVENOUS at 06:27

## 2023-10-15 RX ADMIN — PROPOFOL 45 MCG/KG/MIN: 10 INJECTION, EMULSION INTRAVENOUS at 00:57

## 2023-10-15 RX ADMIN — LEVETIRACETAM 500 MG: 100 INJECTION, SOLUTION INTRAVENOUS at 22:55

## 2023-10-15 RX ADMIN — SODIUM CHLORIDE: 9 INJECTION, SOLUTION INTRAVENOUS at 15:00

## 2023-10-15 RX ADMIN — CALCIUM GLUCONATE 2000 MG: 20 INJECTION, SOLUTION INTRAVENOUS at 06:23

## 2023-10-15 RX ADMIN — HYDROCODONE BITARTRATE AND ACETAMINOPHEN 2 TABLET: 5; 325 TABLET ORAL at 08:24

## 2023-10-15 RX ADMIN — LEVOTHYROXINE SODIUM 50 MCG: 0.05 TABLET ORAL at 06:21

## 2023-10-15 RX ADMIN — IBUPROFEN 800 MG: 200 TABLET, FILM COATED ORAL at 21:50

## 2023-10-15 RX ADMIN — FENTANYL CITRATE 50 MCG: 50 INJECTION, SOLUTION INTRAMUSCULAR; INTRAVENOUS at 15:12

## 2023-10-15 RX ADMIN — Medication 1.5 MCG/KG/HR: at 17:10

## 2023-10-15 RX ADMIN — LEVETIRACETAM 500 MG: 100 INJECTION, SOLUTION INTRAVENOUS at 10:48

## 2023-10-15 RX ADMIN — MORPHINE SULFATE 2 MG: 2 INJECTION, SOLUTION INTRAMUSCULAR; INTRAVENOUS at 14:51

## 2023-10-15 RX ADMIN — FOLIC ACID 1 MG: 5 INJECTION, SOLUTION INTRAMUSCULAR; INTRAVENOUS; SUBCUTANEOUS at 08:40

## 2023-10-15 RX ADMIN — Medication 6 MCG/MIN: at 17:34

## 2023-10-15 RX ADMIN — PROPOFOL 50 MCG/KG/MIN: 10 INJECTION, EMULSION INTRAVENOUS at 11:36

## 2023-10-15 RX ADMIN — CHLORHEXIDINE GLUCONATE 15 ML: 1.2 RINSE ORAL at 21:50

## 2023-10-15 RX ADMIN — POTASSIUM BICARBONATE 40 MEQ: 782 TABLET, EFFERVESCENT ORAL at 05:41

## 2023-10-15 RX ADMIN — PIPERACILLIN AND TAZOBACTAM 3375 MG: 3; .375 INJECTION, POWDER, LYOPHILIZED, FOR SOLUTION INTRAVENOUS at 04:27

## 2023-10-15 RX ADMIN — Medication 1 TABLET: at 08:24

## 2023-10-15 RX ADMIN — CHLORHEXIDINE GLUCONATE 15 ML: 1.2 RINSE ORAL at 08:36

## 2023-10-15 RX ADMIN — PIPERACILLIN AND TAZOBACTAM 3375 MG: 3; .375 INJECTION, POWDER, LYOPHILIZED, FOR SOLUTION INTRAVENOUS at 21:47

## 2023-10-15 RX ADMIN — THIAMINE HYDROCHLORIDE 100 MG: 100 INJECTION, SOLUTION INTRAMUSCULAR; INTRAVENOUS at 08:24

## 2023-10-15 RX ADMIN — DAKIN'S SOLUTION 0.125% (QUARTER STRENGTH): 0.12 SOLUTION at 15:30

## 2023-10-15 RX ADMIN — HYDROCODONE BITARTRATE AND ACETAMINOPHEN 2 TABLET: 5; 325 TABLET ORAL at 17:50

## 2023-10-15 RX ADMIN — Medication 50 MCG/HR: at 22:23

## 2023-10-15 RX ADMIN — Medication 1.5 MCG/KG/HR: at 08:00

## 2023-10-15 RX ADMIN — PROPOFOL 50 MCG/KG/MIN: 10 INJECTION, EMULSION INTRAVENOUS at 21:07

## 2023-10-15 RX ADMIN — Medication 1.5 MCG/KG/HR: at 12:36

## 2023-10-15 RX ADMIN — PROPOFOL 50 MCG/KG/MIN: 10 INJECTION, EMULSION INTRAVENOUS at 17:16

## 2023-10-15 RX ADMIN — SODIUM CHLORIDE: 9 INJECTION, SOLUTION INTRAVENOUS at 04:28

## 2023-10-15 RX ADMIN — Medication 1.5 MCG/KG/HR: at 03:50

## 2023-10-15 RX ADMIN — LANSOPRAZOLE 30 MG: 30 TABLET, ORALLY DISINTEGRATING, DELAYED RELEASE ORAL at 08:24

## 2023-10-15 ASSESSMENT — PULMONARY FUNCTION TESTS
PIF_VALUE: 20
PIF_VALUE: 20
PIF_VALUE: 22
PIF_VALUE: 18
PIF_VALUE: 21
PIF_VALUE: 20

## 2023-10-15 ASSESSMENT — PAIN SCALES - GENERAL: PAINLEVEL_OUTOF10: 7

## 2023-10-15 NOTE — FLOWSHEET NOTE
1430  proceed to do dressing change to left gluteal wound. Noted purulent drainage from under tissue from thigh side. When pressed on thigh very copious amts of purulent tan grey drainage ran out. Thigh very firm down to almost knee on outer aspect. 12  had Beny Caldera CNP come into room to assess. She called Dr Neptali Balbuena and She states that Dr Neptali Balbuena wants the general surgeon on call notifed. 1500  pt grimacing with manipulation of thigh. Informed Beny Caldera CNP and order for fentanyl. See MAR. Dr Saunders Last talking with PA for general surgery. [address] [facility] for surgeon in to assess. Area marked on thigh. She notified Dr Kristin Zhao. 0  Dr Kristin Zhao at bedside to evaluate. States to stop tube feed in case need to go to surgery. Ordered labs for now and in 6 hrs. States to call if any changes in labs or fevers spike or worsens to notify him. 1615  Family at bedside. Pts parents Marina Govea and Jeremy Middlteon here and updated. Informed about the drainage and thigh. Informed that he may need to go tonight for debridment. Both voice understanding. Informed at this time that his surgery is scheduled for 1:00 PM Monday.

## 2023-10-16 ENCOUNTER — ANESTHESIA EVENT (OUTPATIENT)
Dept: OPERATING ROOM | Age: 43
End: 2023-10-16
Payer: COMMERCIAL

## 2023-10-16 ENCOUNTER — APPOINTMENT (OUTPATIENT)
Dept: GENERAL RADIOLOGY | Age: 43
DRG: 710 | End: 2023-10-16
Payer: COMMERCIAL

## 2023-10-16 ENCOUNTER — ANESTHESIA (OUTPATIENT)
Dept: OPERATING ROOM | Age: 43
End: 2023-10-16
Payer: COMMERCIAL

## 2023-10-16 LAB
% INHIBITION AA: 7.6 %
% INHIBITION ADP: 14.3 %
AA % AGGREGATION: 92.4 %
ADP PERCENT AGGREGATION: 85.7 %
ANION GAP SERPL CALC-SCNC: 15 MEQ/L (ref 8–16)
BACTERIA SPEC AEROBE CULT: ABNORMAL
BACTERIA SPEC AEROBE CULT: ABNORMAL
BACTERIA SPEC ANAEROBE CULT: ABNORMAL
BASE EXCESS BLDA CALC-SCNC: -12.1 MMOL/L (ref -2–3)
BASE EXCESS BLDA CALC-SCNC: -13.8 MMOL/L (ref -2–3)
BUN SERPL-MCNC: 19 MG/DL (ref 7–22)
CA-I BLD ISE-SCNC: 1.15 MMOL/L (ref 1.12–1.32)
CA-I BLD ISE-SCNC: 1.17 MMOL/L (ref 1.12–1.32)
CALCIUM SERPL-MCNC: 7.7 MG/DL (ref 8.5–10.5)
CFT BLD TEG: 1 MINUTES (ref 0.8–2.1)
CHLORIDE SERPL-SCNC: 118 MEQ/L (ref 98–111)
CLOT ANGLE BLD TEG: 29.3 MM (ref 15–32)
CLOT ANGLE BLD TEG: 75.8 DEG (ref 63–78)
CLOT INIT BLD TEG: 7.6 MINUTES (ref 4.6–9.1)
CLOT INIT P HPASE BLD TEG: 6.6 MINUTES (ref 4.3–8.3)
CO2 SERPL-SCNC: 15 MEQ/L (ref 23–33)
COLLECTED BY:: ABNORMAL
COLLECTED BY:: ABNORMAL
CREAT SERPL-MCNC: 0.6 MG/DL (ref 0.4–1.2)
DEPRECATED RDW RBC AUTO: 58.5 FL (ref 35–45)
ERYTHROCYTE [DISTWIDTH] IN BLOOD BY AUTOMATED COUNT: 18.4 % (ref 11.5–14.5)
FUNCT FIBRINOGEN LEVEL: 534.7 MG/DL (ref 278–581)
GFR SERPL CREATININE-BSD FRML MDRD: > 60 ML/MIN/1.73M2
GLUCOSE BLD STRIP.AUTO-MCNC: 81 MG/DL (ref 70–108)
GLUCOSE BLD-MCNC: 86 MG/DL (ref 70–108)
GLUCOSE BLD-MCNC: 92 MG/DL (ref 70–108)
GLUCOSE SERPL-MCNC: 86 MG/DL (ref 70–108)
GRAM STN SPEC: ABNORMAL
HCO3 BLDA-SCNC: 16 MMOL/L (ref 23–28)
HCO3 BLDA-SCNC: 17 MMOL/L (ref 23–28)
HCT VFR BLD AUTO: 22.7 % (ref 42–52)
HEMOGLOBIN FINGERSTICK, POC: 7 G/DL (ref 14–18)
HEMOGLOBIN FINGERSTICK, POC: 7.1 G/DL (ref 14–18)
HGB BLD-MCNC: 7.5 GM/DL (ref 14–18)
MA AA BLD RES TEG: 61.2 MM (ref 51–71)
MA ACTF BLD RES TEG: 23.7 MM (ref 2–19)
MA ADP BLD RES TEG: 58.5 MM (ref 45–69)
MA KAOLIN P HPASE BLD RES TEG: 64.3 MM (ref 53–68)
MCF BLD TEG: 67.7 MM (ref 52–69)
MCF.EXTRINSIC BLD ROTEM: 66.3 MM (ref 52–70)
MCH RBC QN AUTO: 29 PG (ref 26–33)
MCHC RBC AUTO-ENTMCNC: 33 GM/DL (ref 32.2–35.5)
MCV RBC AUTO: 87.6 FL (ref 80–94)
ORGANISM: ABNORMAL
ORGANISM: ABNORMAL
PCO2 TEMP ADJ BLDMV: 56 MMHG (ref 41–51)
PCO2 TEMP ADJ BLDMV: 58 MMHG (ref 41–51)
PH BLDMV: 7.06 [PH] (ref 7.31–7.41)
PH BLDMV: 7.08 [PH] (ref 7.31–7.41)
PLATELET # BLD AUTO: 116 THOU/MM3 (ref 130–400)
PMV BLD AUTO: 11.5 FL (ref 9.4–12.4)
PO2 BLDMV: 60 MMHG (ref 25–40)
PO2 BLDMV: 63 MMHG (ref 25–40)
POTASSIUM BLD-SCNC: 3.8 MEQ/L (ref 3.5–4.9)
POTASSIUM BLD-SCNC: 3.8 MEQ/L (ref 3.5–4.9)
POTASSIUM SERPL-SCNC: 3.5 MEQ/L (ref 3.5–5.2)
RBC # BLD AUTO: 2.59 MILL/MM3 (ref 4.7–6.1)
SAO2 % BLDMV: 79 %
SAO2 % BLDMV: 80 %
SODIUM BLD-SCNC: 146 MEQ/L (ref 138–146)
SODIUM BLD-SCNC: 148 MEQ/L (ref 138–146)
SODIUM SERPL-SCNC: 148 MEQ/L (ref 135–145)
WBC # BLD AUTO: 9.7 THOU/MM3 (ref 4.8–10.8)

## 2023-10-16 PROCEDURE — 2709999900 HC NON-CHARGEABLE SUPPLY: Performed by: SURGERY

## 2023-10-16 PROCEDURE — 84132 ASSAY OF SERUM POTASSIUM: CPT

## 2023-10-16 PROCEDURE — 87205 SMEAR GRAM STAIN: CPT

## 2023-10-16 PROCEDURE — 82330 ASSAY OF CALCIUM: CPT

## 2023-10-16 PROCEDURE — 94761 N-INVAS EAR/PLS OXIMETRY MLT: CPT

## 2023-10-16 PROCEDURE — 85018 HEMOGLOBIN: CPT

## 2023-10-16 PROCEDURE — 94003 VENT MGMT INPAT SUBQ DAY: CPT

## 2023-10-16 PROCEDURE — 87070 CULTURE OTHR SPECIMN AEROBIC: CPT

## 2023-10-16 PROCEDURE — 3600000019 HC SURGERY ROBOT ADDTL 15MIN: Performed by: SURGERY

## 2023-10-16 PROCEDURE — 85027 COMPLETE CBC AUTOMATED: CPT

## 2023-10-16 PROCEDURE — 36415 COLL VENOUS BLD VENIPUNCTURE: CPT

## 2023-10-16 PROCEDURE — 85347 COAGULATION TIME ACTIVATED: CPT

## 2023-10-16 PROCEDURE — 6360000002 HC RX W HCPCS: Performed by: EMERGENCY MEDICINE

## 2023-10-16 PROCEDURE — 44188 LAP COLOSTOMY: CPT | Performed by: SURGERY

## 2023-10-16 PROCEDURE — P9047 ALBUMIN (HUMAN), 25%, 50ML: HCPCS | Performed by: NURSE PRACTITIONER

## 2023-10-16 PROCEDURE — 6370000000 HC RX 637 (ALT 250 FOR IP)

## 2023-10-16 PROCEDURE — 8E0W4CZ ROBOTIC ASSISTED PROCEDURE OF TRUNK REGION, PERCUTANEOUS ENDOSCOPIC APPROACH: ICD-10-PCS | Performed by: SURGERY

## 2023-10-16 PROCEDURE — 3600000009 HC SURGERY ROBOT BASE: Performed by: SURGERY

## 2023-10-16 PROCEDURE — 82947 ASSAY GLUCOSE BLOOD QUANT: CPT

## 2023-10-16 PROCEDURE — 2720000010 HC SURG SUPPLY STERILE: Performed by: SURGERY

## 2023-10-16 PROCEDURE — 87147 CULTURE TYPE IMMUNOLOGIC: CPT

## 2023-10-16 PROCEDURE — P9045 ALBUMIN (HUMAN), 5%, 250 ML: HCPCS | Performed by: REGISTERED NURSE

## 2023-10-16 PROCEDURE — 85576 BLOOD PLATELET AGGREGATION: CPT

## 2023-10-16 PROCEDURE — 2580000003 HC RX 258: Performed by: NURSE ANESTHETIST, CERTIFIED REGISTERED

## 2023-10-16 PROCEDURE — 85384 FIBRINOGEN ACTIVITY: CPT

## 2023-10-16 PROCEDURE — 82948 REAGENT STRIP/BLOOD GLUCOSE: CPT

## 2023-10-16 PROCEDURE — 3700000000 HC ANESTHESIA ATTENDED CARE: Performed by: SURGERY

## 2023-10-16 PROCEDURE — 2500000003 HC RX 250 WO HCPCS

## 2023-10-16 PROCEDURE — 87075 CULTR BACTERIA EXCEPT BLOOD: CPT

## 2023-10-16 PROCEDURE — 11043 DBRDMT MUSC&/FSCA 1ST 20/<: CPT | Performed by: SURGERY

## 2023-10-16 PROCEDURE — 2580000003 HC RX 258: Performed by: EMERGENCY MEDICINE

## 2023-10-16 PROCEDURE — 3700000001 HC ADD 15 MINUTES (ANESTHESIA): Performed by: SURGERY

## 2023-10-16 PROCEDURE — 84295 ASSAY OF SERUM SODIUM: CPT

## 2023-10-16 PROCEDURE — 6360000002 HC RX W HCPCS: Performed by: NURSE ANESTHETIST, CERTIFIED REGISTERED

## 2023-10-16 PROCEDURE — 2500000003 HC RX 250 WO HCPCS: Performed by: REGISTERED NURSE

## 2023-10-16 PROCEDURE — C1889 IMPLANT/INSERT DEVICE, NOC: HCPCS | Performed by: SURGERY

## 2023-10-16 PROCEDURE — 99291 CRITICAL CARE FIRST HOUR: CPT | Performed by: INTERNAL MEDICINE

## 2023-10-16 PROCEDURE — 6360000002 HC RX W HCPCS: Performed by: INTERNAL MEDICINE

## 2023-10-16 PROCEDURE — 6360000002 HC RX W HCPCS: Performed by: REGISTERED NURSE

## 2023-10-16 PROCEDURE — S2900 ROBOTIC SURGICAL SYSTEM: HCPCS | Performed by: SURGERY

## 2023-10-16 PROCEDURE — 6370000000 HC RX 637 (ALT 250 FOR IP): Performed by: NURSE PRACTITIONER

## 2023-10-16 PROCEDURE — 0KBP0ZZ EXCISION OF LEFT HIP MUSCLE, OPEN APPROACH: ICD-10-PCS | Performed by: SURGERY

## 2023-10-16 PROCEDURE — 11046 DBRDMT MUSC&/FSCA EA ADDL: CPT | Performed by: SURGERY

## 2023-10-16 PROCEDURE — 82803 BLOOD GASES ANY COMBINATION: CPT

## 2023-10-16 PROCEDURE — 6370000000 HC RX 637 (ALT 250 FOR IP): Performed by: EMERGENCY MEDICINE

## 2023-10-16 PROCEDURE — 80048 BASIC METABOLIC PNL TOTAL CA: CPT

## 2023-10-16 PROCEDURE — 71045 X-RAY EXAM CHEST 1 VIEW: CPT

## 2023-10-16 PROCEDURE — 2500000003 HC RX 250 WO HCPCS: Performed by: NURSE ANESTHETIST, CERTIFIED REGISTERED

## 2023-10-16 PROCEDURE — 2500000003 HC RX 250 WO HCPCS: Performed by: NURSE PRACTITIONER

## 2023-10-16 PROCEDURE — 2000000000 HC ICU R&B

## 2023-10-16 PROCEDURE — 6370000000 HC RX 637 (ALT 250 FOR IP): Performed by: SURGERY

## 2023-10-16 PROCEDURE — 0D1N4Z4 BYPASS SIGMOID COLON TO CUTANEOUS, PERCUTANEOUS ENDOSCOPIC APPROACH: ICD-10-PCS | Performed by: SURGERY

## 2023-10-16 PROCEDURE — 2500000003 HC RX 250 WO HCPCS: Performed by: EMERGENCY MEDICINE

## 2023-10-16 PROCEDURE — 2580000003 HC RX 258: Performed by: REGISTERED NURSE

## 2023-10-16 PROCEDURE — 6360000002 HC RX W HCPCS: Performed by: NURSE PRACTITIONER

## 2023-10-16 DEVICE — CLIP INT L POLYMER LOK LIG HEM O LOK (6EA/PK): Type: IMPLANTABLE DEVICE | Status: FUNCTIONAL

## 2023-10-16 RX ORDER — FENTANYL CITRATE 50 UG/ML
INJECTION, SOLUTION INTRAMUSCULAR; INTRAVENOUS PRN
Status: DISCONTINUED | OUTPATIENT
Start: 2023-10-16 | End: 2023-10-16 | Stop reason: SDUPTHER

## 2023-10-16 RX ORDER — ROCURONIUM BROMIDE 10 MG/ML
INJECTION, SOLUTION INTRAVENOUS PRN
Status: DISCONTINUED | OUTPATIENT
Start: 2023-10-16 | End: 2023-10-16 | Stop reason: SDUPTHER

## 2023-10-16 RX ORDER — ALBUMIN (HUMAN) 12.5 G/50ML
50 SOLUTION INTRAVENOUS ONCE
Status: COMPLETED | OUTPATIENT
Start: 2023-10-16 | End: 2023-10-16

## 2023-10-16 RX ORDER — DEXAMETHASONE SODIUM PHOSPHATE 10 MG/ML
INJECTION, EMULSION INTRAMUSCULAR; INTRAVENOUS PRN
Status: DISCONTINUED | OUTPATIENT
Start: 2023-10-16 | End: 2023-10-16 | Stop reason: SDUPTHER

## 2023-10-16 RX ORDER — FENTANYL CITRATE-0.9 % NACL/PF 10 MCG/ML
PLASTIC BAG, INJECTION (ML) INTRAVENOUS
Status: COMPLETED
Start: 2023-10-16 | End: 2023-10-16

## 2023-10-16 RX ORDER — SODIUM CHLORIDE 9 MG/ML
INJECTION, SOLUTION INTRAVENOUS CONTINUOUS PRN
Status: DISCONTINUED | OUTPATIENT
Start: 2023-10-16 | End: 2023-10-16 | Stop reason: SDUPTHER

## 2023-10-16 RX ORDER — PROPOFOL 10 MG/ML
INJECTION, EMULSION INTRAVENOUS PRN
Status: DISCONTINUED | OUTPATIENT
Start: 2023-10-16 | End: 2023-10-16 | Stop reason: SDUPTHER

## 2023-10-16 RX ORDER — SODIUM CHLORIDE, SODIUM LACTATE, POTASSIUM CHLORIDE, CALCIUM CHLORIDE 600; 310; 30; 20 MG/100ML; MG/100ML; MG/100ML; MG/100ML
INJECTION, SOLUTION INTRAVENOUS CONTINUOUS PRN
Status: DISCONTINUED | OUTPATIENT
Start: 2023-10-16 | End: 2023-10-16 | Stop reason: SDUPTHER

## 2023-10-16 RX ORDER — PROPOFOL 10 MG/ML
5-50 INJECTION, EMULSION INTRAVENOUS CONTINUOUS
Status: DISCONTINUED | OUTPATIENT
Start: 2023-10-16 | End: 2023-10-17

## 2023-10-16 RX ORDER — ALBUMIN, HUMAN INJ 5% 5 %
SOLUTION INTRAVENOUS PRN
Status: DISCONTINUED | OUTPATIENT
Start: 2023-10-16 | End: 2023-10-16 | Stop reason: SDUPTHER

## 2023-10-16 RX ADMIN — PIPERACILLIN AND TAZOBACTAM 3375 MG: 3; .375 INJECTION, POWDER, LYOPHILIZED, FOR SOLUTION INTRAVENOUS at 21:19

## 2023-10-16 RX ADMIN — SODIUM CHLORIDE, POTASSIUM CHLORIDE, SODIUM LACTATE AND CALCIUM CHLORIDE: 600; 310; 30; 20 INJECTION, SOLUTION INTRAVENOUS at 17:04

## 2023-10-16 RX ADMIN — LEVETIRACETAM 500 MG: 100 INJECTION, SOLUTION INTRAVENOUS at 10:20

## 2023-10-16 RX ADMIN — LEVOTHYROXINE SODIUM 50 MCG: 0.05 TABLET ORAL at 06:25

## 2023-10-16 RX ADMIN — PROPOFOL 30 MCG/KG/MIN: 10 INJECTION, EMULSION INTRAVENOUS at 03:53

## 2023-10-16 RX ADMIN — ALBUMIN (HUMAN) 250 ML: 12.5 SOLUTION INTRAVENOUS at 15:58

## 2023-10-16 RX ADMIN — DEXAMETHASONE SODIUM PHOSPHATE 10 MG: 10 INJECTION, EMULSION INTRAMUSCULAR; INTRAVENOUS at 14:28

## 2023-10-16 RX ADMIN — Medication 150 MCG/HR: at 06:47

## 2023-10-16 RX ADMIN — Medication 200 MCG/HR: at 13:29

## 2023-10-16 RX ADMIN — FOLIC ACID 1 MG: 5 INJECTION, SOLUTION INTRAMUSCULAR; INTRAVENOUS; SUBCUTANEOUS at 08:56

## 2023-10-16 RX ADMIN — SODIUM CHLORIDE: 9 INJECTION, SOLUTION INTRAVENOUS at 14:27

## 2023-10-16 RX ADMIN — LANSOPRAZOLE 30 MG: 30 TABLET, ORALLY DISINTEGRATING, DELAYED RELEASE ORAL at 08:52

## 2023-10-16 RX ADMIN — SODIUM CHLORIDE: 9 INJECTION, SOLUTION INTRAVENOUS at 19:32

## 2023-10-16 RX ADMIN — ROCURONIUM BROMIDE 25 MG: 10 INJECTION INTRAVENOUS at 16:10

## 2023-10-16 RX ADMIN — ROCURONIUM BROMIDE 50 MG: 10 INJECTION INTRAVENOUS at 14:28

## 2023-10-16 RX ADMIN — LEVETIRACETAM 500 MG: 100 INJECTION, SOLUTION INTRAVENOUS at 23:14

## 2023-10-16 RX ADMIN — PROPOFOL 50 MG: 10 INJECTION, EMULSION INTRAVENOUS at 14:28

## 2023-10-16 RX ADMIN — ROCURONIUM BROMIDE 50 MG: 10 INJECTION INTRAVENOUS at 15:27

## 2023-10-16 RX ADMIN — DAKIN'S SOLUTION 0.125% (QUARTER STRENGTH): 0.12 SOLUTION at 08:52

## 2023-10-16 RX ADMIN — PIPERACILLIN AND TAZOBACTAM 3375 MG: 3; .375 INJECTION, POWDER, LYOPHILIZED, FOR SOLUTION INTRAVENOUS at 15:12

## 2023-10-16 RX ADMIN — SODIUM CHLORIDE: 9 INJECTION, SOLUTION INTRAVENOUS at 00:40

## 2023-10-16 RX ADMIN — THIAMINE HYDROCHLORIDE 100 MG: 100 INJECTION, SOLUTION INTRAMUSCULAR; INTRAVENOUS at 08:52

## 2023-10-16 RX ADMIN — Medication 50 MEQ: at 16:21

## 2023-10-16 RX ADMIN — ALBUMIN (HUMAN) 250 ML: 12.5 SOLUTION INTRAVENOUS at 16:19

## 2023-10-16 RX ADMIN — Medication 200 MCG/HR: at 19:31

## 2023-10-16 RX ADMIN — FENTANYL CITRATE 100 MCG: 50 INJECTION, SOLUTION INTRAMUSCULAR; INTRAVENOUS at 14:28

## 2023-10-16 RX ADMIN — ROCURONIUM BROMIDE 10 MG: 10 INJECTION INTRAVENOUS at 16:56

## 2023-10-16 RX ADMIN — CHLORHEXIDINE GLUCONATE 15 ML: 1.2 RINSE ORAL at 21:21

## 2023-10-16 RX ADMIN — PIPERACILLIN AND TAZOBACTAM 3375 MG: 3; .375 INJECTION, POWDER, LYOPHILIZED, FOR SOLUTION INTRAVENOUS at 06:21

## 2023-10-16 RX ADMIN — SODIUM CHLORIDE, PRESERVATIVE FREE 10 ML: 5 INJECTION INTRAVENOUS at 21:21

## 2023-10-16 RX ADMIN — Medication 1 TABLET: at 08:52

## 2023-10-16 RX ADMIN — ALBUMIN (HUMAN) 50 G: 0.25 INJECTION, SOLUTION INTRAVENOUS at 03:50

## 2023-10-16 RX ADMIN — CHLORHEXIDINE GLUCONATE 15 ML: 1.2 RINSE ORAL at 08:52

## 2023-10-16 ASSESSMENT — PULMONARY FUNCTION TESTS
PIF_VALUE: 16
PIF_VALUE: 18
PIF_VALUE: 0.18
PIF_VALUE: 16
PIF_VALUE: 18
PIF_VALUE: 18

## 2023-10-17 LAB
ANION GAP SERPL CALC-SCNC: 13 MEQ/L (ref 8–16)
BUN SERPL-MCNC: 25 MG/DL (ref 7–22)
CA-I BLD ISE-SCNC: 1.01 MMOL/L (ref 1.12–1.32)
CALCIUM SERPL-MCNC: 6.8 MG/DL (ref 8.5–10.5)
CHLORIDE SERPL-SCNC: 119 MEQ/L (ref 98–111)
CO2 SERPL-SCNC: 15 MEQ/L (ref 23–33)
CREAT SERPL-MCNC: 0.9 MG/DL (ref 0.4–1.2)
DEPRECATED RDW RBC AUTO: 59.3 FL (ref 35–45)
ERYTHROCYTE [DISTWIDTH] IN BLOOD BY AUTOMATED COUNT: 18.1 % (ref 11.5–14.5)
GFR SERPL CREATININE-BSD FRML MDRD: > 60 ML/MIN/1.73M2
GLUCOSE BLD STRIP.AUTO-MCNC: 82 MG/DL (ref 70–108)
GLUCOSE SERPL-MCNC: 94 MG/DL (ref 70–108)
HCT VFR BLD AUTO: 21.7 % (ref 42–52)
HGB BLD-MCNC: 7 GM/DL (ref 14–18)
MCH RBC QN AUTO: 28.8 PG (ref 26–33)
MCHC RBC AUTO-ENTMCNC: 32.3 GM/DL (ref 32.2–35.5)
MCV RBC AUTO: 89.3 FL (ref 80–94)
PLATELET # BLD AUTO: 152 THOU/MM3 (ref 130–400)
PMV BLD AUTO: 11.3 FL (ref 9.4–12.4)
POTASSIUM SERPL-SCNC: 4.6 MEQ/L (ref 3.5–5.2)
RBC # BLD AUTO: 2.43 MILL/MM3 (ref 4.7–6.1)
SCAN OF BLOOD SMEAR: NORMAL
SODIUM SERPL-SCNC: 147 MEQ/L (ref 135–145)
TRIGL SERPL-MCNC: 161 MG/DL (ref 0–199)
WBC # BLD AUTO: 10.9 THOU/MM3 (ref 4.8–10.8)

## 2023-10-17 PROCEDURE — 2500000003 HC RX 250 WO HCPCS: Performed by: EMERGENCY MEDICINE

## 2023-10-17 PROCEDURE — 2580000003 HC RX 258: Performed by: EMERGENCY MEDICINE

## 2023-10-17 PROCEDURE — 94003 VENT MGMT INPAT SUBQ DAY: CPT

## 2023-10-17 PROCEDURE — 6360000002 HC RX W HCPCS: Performed by: EMERGENCY MEDICINE

## 2023-10-17 PROCEDURE — 82948 REAGENT STRIP/BLOOD GLUCOSE: CPT

## 2023-10-17 PROCEDURE — 2700000000 HC OXYGEN THERAPY PER DAY

## 2023-10-17 PROCEDURE — 85027 COMPLETE CBC AUTOMATED: CPT

## 2023-10-17 PROCEDURE — 80048 BASIC METABOLIC PNL TOTAL CA: CPT

## 2023-10-17 PROCEDURE — 36415 COLL VENOUS BLD VENIPUNCTURE: CPT

## 2023-10-17 PROCEDURE — 6360000002 HC RX W HCPCS: Performed by: NURSE PRACTITIONER

## 2023-10-17 PROCEDURE — 2000000000 HC ICU R&B

## 2023-10-17 PROCEDURE — 6370000000 HC RX 637 (ALT 250 FOR IP): Performed by: EMERGENCY MEDICINE

## 2023-10-17 PROCEDURE — 94761 N-INVAS EAR/PLS OXIMETRY MLT: CPT

## 2023-10-17 PROCEDURE — 99291 CRITICAL CARE FIRST HOUR: CPT | Performed by: INTERNAL MEDICINE

## 2023-10-17 PROCEDURE — APPSS30 APP SPLIT SHARED TIME 16-30 MINUTES: Performed by: NURSE PRACTITIONER

## 2023-10-17 PROCEDURE — 2500000003 HC RX 250 WO HCPCS

## 2023-10-17 PROCEDURE — 84478 ASSAY OF TRIGLYCERIDES: CPT

## 2023-10-17 PROCEDURE — 6370000000 HC RX 637 (ALT 250 FOR IP): Performed by: NURSE PRACTITIONER

## 2023-10-17 PROCEDURE — 6370000000 HC RX 637 (ALT 250 FOR IP)

## 2023-10-17 PROCEDURE — 6360000002 HC RX W HCPCS: Performed by: INTERNAL MEDICINE

## 2023-10-17 PROCEDURE — 92610 EVALUATE SWALLOWING FUNCTION: CPT

## 2023-10-17 PROCEDURE — 2580000003 HC RX 258: Performed by: NURSE PRACTITIONER

## 2023-10-17 PROCEDURE — 82330 ASSAY OF CALCIUM: CPT

## 2023-10-17 PROCEDURE — 99024 POSTOP FOLLOW-UP VISIT: CPT | Performed by: NURSE PRACTITIONER

## 2023-10-17 RX ORDER — FENTANYL CITRATE-0.9 % NACL/PF 10 MCG/ML
PLASTIC BAG, INJECTION (ML) INTRAVENOUS
Status: COMPLETED
Start: 2023-10-17 | End: 2023-10-17

## 2023-10-17 RX ADMIN — DAKIN'S SOLUTION 0.125% (QUARTER STRENGTH): 0.12 SOLUTION at 09:10

## 2023-10-17 RX ADMIN — MORPHINE SULFATE 2 MG: 2 INJECTION, SOLUTION INTRAMUSCULAR; INTRAVENOUS at 12:57

## 2023-10-17 RX ADMIN — SODIUM CHLORIDE, PRESERVATIVE FREE 10 ML: 5 INJECTION INTRAVENOUS at 20:37

## 2023-10-17 RX ADMIN — LEVETIRACETAM 500 MG: 100 INJECTION, SOLUTION INTRAVENOUS at 22:13

## 2023-10-17 RX ADMIN — PIPERACILLIN AND TAZOBACTAM 3375 MG: 3; .375 INJECTION, POWDER, LYOPHILIZED, FOR SOLUTION INTRAVENOUS at 13:54

## 2023-10-17 RX ADMIN — Medication 1 TABLET: at 08:51

## 2023-10-17 RX ADMIN — CHLORHEXIDINE GLUCONATE 15 ML: 1.2 RINSE ORAL at 20:37

## 2023-10-17 RX ADMIN — PIPERACILLIN AND TAZOBACTAM 3375 MG: 3; .375 INJECTION, POWDER, LYOPHILIZED, FOR SOLUTION INTRAVENOUS at 05:44

## 2023-10-17 RX ADMIN — THIAMINE HYDROCHLORIDE 100 MG: 100 INJECTION, SOLUTION INTRAMUSCULAR; INTRAVENOUS at 08:51

## 2023-10-17 RX ADMIN — LEVOTHYROXINE SODIUM 50 MCG: 0.05 TABLET ORAL at 05:59

## 2023-10-17 RX ADMIN — CHLORHEXIDINE GLUCONATE 15 ML: 1.2 RINSE ORAL at 08:51

## 2023-10-17 RX ADMIN — HYDROMORPHONE HYDROCHLORIDE 1 MG: 1 INJECTION, SOLUTION INTRAMUSCULAR; INTRAVENOUS; SUBCUTANEOUS at 22:01

## 2023-10-17 RX ADMIN — LANSOPRAZOLE 30 MG: 30 TABLET, ORALLY DISINTEGRATING, DELAYED RELEASE ORAL at 08:51

## 2023-10-17 RX ADMIN — FOLIC ACID 1 MG: 5 INJECTION, SOLUTION INTRAMUSCULAR; INTRAVENOUS; SUBCUTANEOUS at 09:04

## 2023-10-17 RX ADMIN — MORPHINE SULFATE 2 MG: 2 INJECTION, SOLUTION INTRAMUSCULAR; INTRAVENOUS at 17:03

## 2023-10-17 RX ADMIN — LEVETIRACETAM 500 MG: 100 INJECTION, SOLUTION INTRAVENOUS at 10:33

## 2023-10-17 RX ADMIN — SODIUM CHLORIDE: 9 INJECTION, SOLUTION INTRAVENOUS at 20:28

## 2023-10-17 RX ADMIN — Medication 200 MCG/HR: at 05:46

## 2023-10-17 RX ADMIN — PROPOFOL 30 MCG/KG/MIN: 10 INJECTION, EMULSION INTRAVENOUS at 05:53

## 2023-10-17 RX ADMIN — PIPERACILLIN AND TAZOBACTAM 3375 MG: 3; .375 INJECTION, POWDER, LYOPHILIZED, FOR SOLUTION INTRAVENOUS at 22:10

## 2023-10-17 RX ADMIN — MORPHINE SULFATE 2 MG: 2 INJECTION, SOLUTION INTRAMUSCULAR; INTRAVENOUS at 20:26

## 2023-10-17 RX ADMIN — SODIUM CHLORIDE: 9 INJECTION, SOLUTION INTRAVENOUS at 05:50

## 2023-10-17 RX ADMIN — Medication 200 MCG/HR: at 00:18

## 2023-10-17 ASSESSMENT — PAIN DESCRIPTION - LOCATION: LOCATION: RECTUM

## 2023-10-17 ASSESSMENT — PAIN SCALES - GENERAL
PAINLEVEL_OUTOF10: 6
PAINLEVEL_OUTOF10: 0
PAINLEVEL_OUTOF10: 7
PAINLEVEL_OUTOF10: 2
PAINLEVEL_OUTOF10: 6
PAINLEVEL_OUTOF10: 8
PAINLEVEL_OUTOF10: 8
PAINLEVEL_OUTOF10: 2

## 2023-10-17 ASSESSMENT — PAIN DESCRIPTION - ORIENTATION
ORIENTATION: LEFT;POSTERIOR
ORIENTATION: LEFT;POSTERIOR

## 2023-10-17 ASSESSMENT — PAIN SCALES - WONG BAKER
WONGBAKER_NUMERICALRESPONSE: 2
WONGBAKER_NUMERICALRESPONSE: 0
WONGBAKER_NUMERICALRESPONSE: 0

## 2023-10-17 ASSESSMENT — PAIN DESCRIPTION - DESCRIPTORS
DESCRIPTORS: THROBBING;SORE
DESCRIPTORS: THROBBING;SORE

## 2023-10-17 ASSESSMENT — PULMONARY FUNCTION TESTS
PIF_VALUE: 16
PIF_VALUE: 14
PIF_VALUE: 18

## 2023-10-17 NOTE — ANESTHESIA POSTPROCEDURE EVALUATION
Department of Anesthesiology  Postprocedure Note    Patient: Adolph Gallego  MRN: [id number]  YOB: 1980  Date of evaluation: 10/17/2023      Procedure Summary     Date: 10/16/23 Room / Location: [address] [place] [address] / [facility]    Anesthesia Start: 4550 Anesthesia Stop: 0792    Procedure: Robotic Diverting Colostomy;Debridement of Gluteal wound and Left thigh/leg Diagnosis:       Abscess      (Abscess [L02.91])    Surgeons: Wyatt Braun MD Responsible Provider: Amadou Momin DO    Anesthesia Type: general ASA Status: 4          Anesthesia Type: No value filed.     Angeli Phase I:      Angeli Phase II:        Anesthesia Post Evaluation    Patient location during evaluation: ICU  Patient participation: complete - patient cannot participate  Level of consciousness: sedated and ventilated  Airway patency: patent  Complications: no  Cardiovascular status: hemodynamically stable  Respiratory status: acceptable  Pain management: adequate

## 2023-10-18 ENCOUNTER — APPOINTMENT (OUTPATIENT)
Dept: GENERAL RADIOLOGY | Age: 43
DRG: 710 | End: 2023-10-18
Payer: COMMERCIAL

## 2023-10-18 LAB
ABO: NORMAL
ANION GAP SERPL CALC-SCNC: 12 MEQ/L (ref 8–16)
ANION GAP SERPL CALC-SCNC: 12 MEQ/L (ref 8–16)
ANTIBODY SCREEN: NORMAL
BUN SERPL-MCNC: 22 MG/DL (ref 7–22)
BUN SERPL-MCNC: 27 MG/DL (ref 7–22)
CA-I BLD ISE-SCNC: 1.04 MMOL/L (ref 1.12–1.32)
CA-I BLD ISE-SCNC: 1.1 MMOL/L (ref 1.12–1.32)
CALCIUM SERPL-MCNC: 7.1 MG/DL (ref 8.5–10.5)
CALCIUM SERPL-MCNC: 7.7 MG/DL (ref 8.5–10.5)
CHLORIDE 24H UR-SRATE: 71 MEQ/L
CHLORIDE SERPL-SCNC: 121 MEQ/L (ref 98–111)
CHLORIDE SERPL-SCNC: 122 MEQ/L (ref 98–111)
CO2 SERPL-SCNC: 15 MEQ/L (ref 23–33)
CO2 SERPL-SCNC: 16 MEQ/L (ref 23–33)
CREAT SERPL-MCNC: 0.9 MG/DL (ref 0.4–1.2)
CREAT SERPL-MCNC: 1.1 MG/DL (ref 0.4–1.2)
DEPRECATED RDW RBC AUTO: 59 FL (ref 35–45)
ERYTHROCYTE [DISTWIDTH] IN BLOOD BY AUTOMATED COUNT: 18.2 % (ref 11.5–14.5)
GFR SERPL CREATININE-BSD FRML MDRD: > 60 ML/MIN/1.73M2
GFR SERPL CREATININE-BSD FRML MDRD: > 60 ML/MIN/1.73M2
GLUCOSE SERPL-MCNC: 79 MG/DL (ref 70–108)
GLUCOSE SERPL-MCNC: 99 MG/DL (ref 70–108)
HCT VFR BLD AUTO: 21.2 % (ref 42–52)
HCT VFR BLD AUTO: 33.9 % (ref 42–52)
HGB BLD-MCNC: 11.2 GM/DL (ref 14–18)
HGB BLD-MCNC: 6.9 GM/DL (ref 14–18)
MCH RBC QN AUTO: 29.4 PG (ref 26–33)
MCHC RBC AUTO-ENTMCNC: 32.5 GM/DL (ref 32.2–35.5)
MCV RBC AUTO: 90.2 FL (ref 80–94)
PLATELET # BLD AUTO: 196 THOU/MM3 (ref 130–400)
PMV BLD AUTO: 10.3 FL (ref 9.4–12.4)
POTASSIUM SERPL-SCNC: 3.4 MEQ/L (ref 3.5–5.2)
POTASSIUM SERPL-SCNC: 3.8 MEQ/L (ref 3.5–5.2)
POTASSIUM UR-SCNC: 26.9 MEQ/L
RBC # BLD AUTO: 2.35 MILL/MM3 (ref 4.7–6.1)
RH FACTOR: NORMAL
SCAN OF BLOOD SMEAR: NORMAL
SODIUM SERPL-SCNC: 148 MEQ/L (ref 135–145)
SODIUM SERPL-SCNC: 150 MEQ/L (ref 135–145)
SODIUM UR-SCNC: 63 MEQ/L
WBC # BLD AUTO: 10.1 THOU/MM3 (ref 4.8–10.8)

## 2023-10-18 PROCEDURE — 86900 BLOOD TYPING SEROLOGIC ABO: CPT

## 2023-10-18 PROCEDURE — 84133 ASSAY OF URINE POTASSIUM: CPT

## 2023-10-18 PROCEDURE — 86850 RBC ANTIBODY SCREEN: CPT

## 2023-10-18 PROCEDURE — 85018 HEMOGLOBIN: CPT

## 2023-10-18 PROCEDURE — 99233 SBSQ HOSP IP/OBS HIGH 50: CPT | Performed by: INTERNAL MEDICINE

## 2023-10-18 PROCEDURE — 74018 RADEX ABDOMEN 1 VIEW: CPT

## 2023-10-18 PROCEDURE — 92526 ORAL FUNCTION THERAPY: CPT

## 2023-10-18 PROCEDURE — 6370000000 HC RX 637 (ALT 250 FOR IP): Performed by: EMERGENCY MEDICINE

## 2023-10-18 PROCEDURE — 84300 ASSAY OF URINE SODIUM: CPT

## 2023-10-18 PROCEDURE — 6360000002 HC RX W HCPCS

## 2023-10-18 PROCEDURE — 82436 ASSAY OF URINE CHLORIDE: CPT

## 2023-10-18 PROCEDURE — 80048 BASIC METABOLIC PNL TOTAL CA: CPT

## 2023-10-18 PROCEDURE — P9016 RBC LEUKOCYTES REDUCED: HCPCS

## 2023-10-18 PROCEDURE — 36430 TRANSFUSION BLD/BLD COMPNT: CPT

## 2023-10-18 PROCEDURE — 36415 COLL VENOUS BLD VENIPUNCTURE: CPT

## 2023-10-18 PROCEDURE — 92612 ENDOSCOPY SWALLOW (FEES) VID: CPT

## 2023-10-18 PROCEDURE — 36592 COLLECT BLOOD FROM PICC: CPT

## 2023-10-18 PROCEDURE — 86901 BLOOD TYPING SEROLOGIC RH(D): CPT

## 2023-10-18 PROCEDURE — 2580000003 HC RX 258: Performed by: EMERGENCY MEDICINE

## 2023-10-18 PROCEDURE — 2500000003 HC RX 250 WO HCPCS: Performed by: EMERGENCY MEDICINE

## 2023-10-18 PROCEDURE — 6360000002 HC RX W HCPCS: Performed by: INTERNAL MEDICINE

## 2023-10-18 PROCEDURE — 86923 COMPATIBILITY TEST ELECTRIC: CPT

## 2023-10-18 PROCEDURE — 6360000002 HC RX W HCPCS: Performed by: NURSE PRACTITIONER

## 2023-10-18 PROCEDURE — 2000000000 HC ICU R&B

## 2023-10-18 PROCEDURE — 2500000003 HC RX 250 WO HCPCS: Performed by: STUDENT IN AN ORGANIZED HEALTH CARE EDUCATION/TRAINING PROGRAM

## 2023-10-18 PROCEDURE — 82330 ASSAY OF CALCIUM: CPT

## 2023-10-18 PROCEDURE — 85027 COMPLETE CBC AUTOMATED: CPT

## 2023-10-18 PROCEDURE — 6370000000 HC RX 637 (ALT 250 FOR IP): Performed by: STUDENT IN AN ORGANIZED HEALTH CARE EDUCATION/TRAINING PROGRAM

## 2023-10-18 PROCEDURE — 2580000003 HC RX 258: Performed by: STUDENT IN AN ORGANIZED HEALTH CARE EDUCATION/TRAINING PROGRAM

## 2023-10-18 PROCEDURE — 2500000003 HC RX 250 WO HCPCS: Performed by: NURSE PRACTITIONER

## 2023-10-18 PROCEDURE — 6370000000 HC RX 637 (ALT 250 FOR IP): Performed by: SURGERY

## 2023-10-18 PROCEDURE — 85014 HEMATOCRIT: CPT

## 2023-10-18 PROCEDURE — 6360000002 HC RX W HCPCS: Performed by: EMERGENCY MEDICINE

## 2023-10-18 RX ORDER — CLONIDINE HYDROCHLORIDE 0.1 MG/1
0.1 TABLET ORAL 3 TIMES DAILY
Status: DISCONTINUED | OUTPATIENT
Start: 2023-10-18 | End: 2023-10-19

## 2023-10-18 RX ORDER — SODIUM CHLORIDE 9 MG/ML
INJECTION, SOLUTION INTRAVENOUS PRN
Status: DISCONTINUED | OUTPATIENT
Start: 2023-10-18 | End: 2023-10-22

## 2023-10-18 RX ORDER — HYDRALAZINE HYDROCHLORIDE 20 MG/ML
10 INJECTION INTRAMUSCULAR; INTRAVENOUS EVERY 6 HOURS PRN
Status: DISCONTINUED | OUTPATIENT
Start: 2023-10-18 | End: 2023-10-20

## 2023-10-18 RX ORDER — ACETAMINOPHEN 325 MG/1
650 TABLET ORAL EVERY 6 HOURS
Status: DISCONTINUED | OUTPATIENT
Start: 2023-10-18 | End: 2023-10-24 | Stop reason: HOSPADM

## 2023-10-18 RX ORDER — HYDRALAZINE HYDROCHLORIDE 20 MG/ML
INJECTION INTRAMUSCULAR; INTRAVENOUS
Status: COMPLETED
Start: 2023-10-18 | End: 2023-10-18

## 2023-10-18 RX ORDER — METHOCARBAMOL 500 MG/1
750 TABLET, FILM COATED ORAL 4 TIMES DAILY
Status: DISCONTINUED | OUTPATIENT
Start: 2023-10-18 | End: 2023-10-24 | Stop reason: HOSPADM

## 2023-10-18 RX ORDER — CALCIUM GLUCONATE 20 MG/ML
2000 INJECTION, SOLUTION INTRAVENOUS ONCE
Status: COMPLETED | OUTPATIENT
Start: 2023-10-18 | End: 2023-10-18

## 2023-10-18 RX ORDER — FENTANYL CITRATE 50 UG/ML
50 INJECTION, SOLUTION INTRAMUSCULAR; INTRAVENOUS ONCE
Status: COMPLETED | OUTPATIENT
Start: 2023-10-18 | End: 2023-10-18

## 2023-10-18 RX ORDER — AMLODIPINE BESYLATE 5 MG/1
5 TABLET ORAL DAILY
Status: DISCONTINUED | OUTPATIENT
Start: 2023-10-18 | End: 2023-10-19

## 2023-10-18 RX ADMIN — METHOCARBAMOL 750 MG: 500 TABLET ORAL at 15:18

## 2023-10-18 RX ADMIN — CLONIDINE HYDROCHLORIDE 0.1 MG: 0.1 TABLET ORAL at 15:17

## 2023-10-18 RX ADMIN — SODIUM CHLORIDE, PRESERVATIVE FREE 10 ML: 5 INJECTION INTRAVENOUS at 20:13

## 2023-10-18 RX ADMIN — PIPERACILLIN AND TAZOBACTAM 3375 MG: 3; .375 INJECTION, POWDER, LYOPHILIZED, FOR SOLUTION INTRAVENOUS at 05:57

## 2023-10-18 RX ADMIN — THIAMINE HYDROCHLORIDE 100 MG: 100 INJECTION, SOLUTION INTRAMUSCULAR; INTRAVENOUS at 09:06

## 2023-10-18 RX ADMIN — SODIUM BICARBONATE 100 ML/HR: 84 INJECTION, SOLUTION INTRAVENOUS at 09:35

## 2023-10-18 RX ADMIN — HYDROMORPHONE HYDROCHLORIDE 1 MG: 1 INJECTION, SOLUTION INTRAMUSCULAR; INTRAVENOUS; SUBCUTANEOUS at 10:12

## 2023-10-18 RX ADMIN — LEVETIRACETAM 500 MG: 100 INJECTION, SOLUTION INTRAVENOUS at 11:23

## 2023-10-18 RX ADMIN — PIPERACILLIN AND TAZOBACTAM 3375 MG: 3; .375 INJECTION, POWDER, LYOPHILIZED, FOR SOLUTION INTRAVENOUS at 14:35

## 2023-10-18 RX ADMIN — PIPERACILLIN AND TAZOBACTAM 3375 MG: 3; .375 INJECTION, POWDER, LYOPHILIZED, FOR SOLUTION INTRAVENOUS at 20:16

## 2023-10-18 RX ADMIN — SODIUM CHLORIDE 15 MG/HR: 9 INJECTION, SOLUTION INTRAVENOUS at 16:59

## 2023-10-18 RX ADMIN — METHOCARBAMOL 750 MG: 500 TABLET ORAL at 20:12

## 2023-10-18 RX ADMIN — POTASSIUM BICARBONATE 40 MEQ: 782 TABLET, EFFERVESCENT ORAL at 20:12

## 2023-10-18 RX ADMIN — MORPHINE SULFATE 2 MG: 2 INJECTION, SOLUTION INTRAMUSCULAR; INTRAVENOUS at 00:02

## 2023-10-18 RX ADMIN — SODIUM CHLORIDE 15 MG/HR: 9 INJECTION, SOLUTION INTRAVENOUS at 20:25

## 2023-10-18 RX ADMIN — SODIUM CHLORIDE, PRESERVATIVE FREE 10 ML: 5 INJECTION INTRAVENOUS at 09:08

## 2023-10-18 RX ADMIN — FENTANYL CITRATE 50 MCG: 50 INJECTION, SOLUTION INTRAMUSCULAR; INTRAVENOUS at 03:00

## 2023-10-18 RX ADMIN — DAKIN'S SOLUTION 0.125% (QUARTER STRENGTH): 0.12 SOLUTION at 16:00

## 2023-10-18 RX ADMIN — SODIUM CHLORIDE 15 MG/HR: 9 INJECTION, SOLUTION INTRAVENOUS at 22:12

## 2023-10-18 RX ADMIN — SODIUM CHLORIDE 15 MG/HR: 9 INJECTION, SOLUTION INTRAVENOUS at 18:49

## 2023-10-18 RX ADMIN — HYDRALAZINE HYDROCHLORIDE 10 MG: 20 INJECTION INTRAMUSCULAR; INTRAVENOUS at 10:15

## 2023-10-18 RX ADMIN — CALCIUM GLUCONATE 2000 MG: 20 INJECTION, SOLUTION INTRAVENOUS at 12:03

## 2023-10-18 RX ADMIN — HYDRALAZINE HYDROCHLORIDE 10 MG: 20 INJECTION, SOLUTION INTRAMUSCULAR; INTRAVENOUS at 10:15

## 2023-10-18 RX ADMIN — SODIUM CHLORIDE 5 MG/HR: 9 INJECTION, SOLUTION INTRAVENOUS at 11:56

## 2023-10-18 RX ADMIN — CLONIDINE HYDROCHLORIDE 0.1 MG: 0.1 TABLET ORAL at 20:12

## 2023-10-18 RX ADMIN — HYDROMORPHONE HYDROCHLORIDE 1 MG: 1 INJECTION, SOLUTION INTRAMUSCULAR; INTRAVENOUS; SUBCUTANEOUS at 01:35

## 2023-10-18 RX ADMIN — HYDROMORPHONE HYDROCHLORIDE 1 MG: 1 INJECTION, SOLUTION INTRAMUSCULAR; INTRAVENOUS; SUBCUTANEOUS at 14:07

## 2023-10-18 RX ADMIN — FOLIC ACID 1 MG: 5 INJECTION, SOLUTION INTRAMUSCULAR; INTRAVENOUS; SUBCUTANEOUS at 09:04

## 2023-10-18 RX ADMIN — ACETAMINOPHEN 650 MG: 325 TABLET ORAL at 14:11

## 2023-10-18 RX ADMIN — AMLODIPINE BESYLATE 5 MG: 5 TABLET ORAL at 15:17

## 2023-10-18 RX ADMIN — SODIUM CHLORIDE 15 MG/HR: 9 INJECTION, SOLUTION INTRAVENOUS at 14:52

## 2023-10-18 RX ADMIN — LEVETIRACETAM 500 MG: 100 INJECTION, SOLUTION INTRAVENOUS at 23:11

## 2023-10-18 RX ADMIN — ACETAMINOPHEN 650 MG: 325 TABLET ORAL at 20:12

## 2023-10-18 ASSESSMENT — PAIN DESCRIPTION - LOCATION
LOCATION: ABDOMEN;ARM
LOCATION: HIP

## 2023-10-18 ASSESSMENT — PAIN DESCRIPTION - DESCRIPTORS
DESCRIPTORS: SQUEEZING;THROBBING
DESCRIPTORS: THROBBING;SORE
DESCRIPTORS: ACHING

## 2023-10-18 ASSESSMENT — PAIN SCALES - GENERAL
PAINLEVEL_OUTOF10: 8
PAINLEVEL_OUTOF10: 7
PAINLEVEL_OUTOF10: 0
PAINLEVEL_OUTOF10: 8
PAINLEVEL_OUTOF10: 0
PAINLEVEL_OUTOF10: 4
PAINLEVEL_OUTOF10: 0
PAINLEVEL_OUTOF10: 8

## 2023-10-18 ASSESSMENT — PAIN DESCRIPTION - ORIENTATION
ORIENTATION: LEFT;POSTERIOR
ORIENTATION: RIGHT
ORIENTATION: RIGHT

## 2023-10-18 ASSESSMENT — ENCOUNTER SYMPTOMS
CHEST TIGHTNESS: 0
SHORTNESS OF BREATH: 0
VOMITING: 0
NAUSEA: 0

## 2023-10-18 ASSESSMENT — PAIN - FUNCTIONAL ASSESSMENT: PAIN_FUNCTIONAL_ASSESSMENT: ACTIVITIES ARE NOT PREVENTED

## 2023-10-19 ENCOUNTER — APPOINTMENT (OUTPATIENT)
Dept: GENERAL RADIOLOGY | Age: 43
DRG: 710 | End: 2023-10-19
Payer: COMMERCIAL

## 2023-10-19 LAB
ANION GAP SERPL CALC-SCNC: 10 MEQ/L (ref 8–16)
BACTERIA SPEC AEROBE CULT: ABNORMAL
BACTERIA SPEC ANAEROBE CULT: ABNORMAL
BUN SERPL-MCNC: 17 MG/DL (ref 7–22)
CA-I BLD ISE-SCNC: 1.07 MMOL/L (ref 1.12–1.32)
CALCIUM SERPL-MCNC: 7.5 MG/DL (ref 8.5–10.5)
CHLORIDE SERPL-SCNC: 122 MEQ/L (ref 98–111)
CO2 SERPL-SCNC: 19 MEQ/L (ref 23–33)
CREAT SERPL-MCNC: 0.7 MG/DL (ref 0.4–1.2)
DEPRECATED RDW RBC AUTO: 49.9 FL (ref 35–45)
ERYTHROCYTE [DISTWIDTH] IN BLOOD BY AUTOMATED COUNT: 16.3 % (ref 11.5–14.5)
GFR SERPL CREATININE-BSD FRML MDRD: > 60 ML/MIN/1.73M2
GLUCOSE SERPL-MCNC: 140 MG/DL (ref 70–108)
GRAM STN SPEC: ABNORMAL
HCT VFR BLD AUTO: 33.8 % (ref 42–52)
HGB BLD-MCNC: 11.2 GM/DL (ref 14–18)
MCH RBC QN AUTO: 28.5 PG (ref 26–33)
MCHC RBC AUTO-ENTMCNC: 33.1 GM/DL (ref 32.2–35.5)
MCV RBC AUTO: 86 FL (ref 80–94)
ORGANISM: ABNORMAL
PLATELET # BLD AUTO: 207 THOU/MM3 (ref 130–400)
PMV BLD AUTO: 10 FL (ref 9.4–12.4)
POTASSIUM SERPL-SCNC: 3.3 MEQ/L (ref 3.5–5.2)
RBC # BLD AUTO: 3.93 MILL/MM3 (ref 4.7–6.1)
SODIUM SERPL-SCNC: 151 MEQ/L (ref 135–145)
WBC # BLD AUTO: 11 THOU/MM3 (ref 4.8–10.8)

## 2023-10-19 PROCEDURE — 99233 SBSQ HOSP IP/OBS HIGH 50: CPT | Performed by: INTERNAL MEDICINE

## 2023-10-19 PROCEDURE — 2500000003 HC RX 250 WO HCPCS: Performed by: STUDENT IN AN ORGANIZED HEALTH CARE EDUCATION/TRAINING PROGRAM

## 2023-10-19 PROCEDURE — 6360000002 HC RX W HCPCS: Performed by: EMERGENCY MEDICINE

## 2023-10-19 PROCEDURE — 2580000003 HC RX 258: Performed by: EMERGENCY MEDICINE

## 2023-10-19 PROCEDURE — 2500000003 HC RX 250 WO HCPCS: Performed by: EMERGENCY MEDICINE

## 2023-10-19 PROCEDURE — 92523 SPEECH SOUND LANG COMPREHEN: CPT

## 2023-10-19 PROCEDURE — 6370000000 HC RX 637 (ALT 250 FOR IP): Performed by: NURSE PRACTITIONER

## 2023-10-19 PROCEDURE — 85027 COMPLETE CBC AUTOMATED: CPT

## 2023-10-19 PROCEDURE — 2500000003 HC RX 250 WO HCPCS: Performed by: NURSE PRACTITIONER

## 2023-10-19 PROCEDURE — 2580000003 HC RX 258: Performed by: STUDENT IN AN ORGANIZED HEALTH CARE EDUCATION/TRAINING PROGRAM

## 2023-10-19 PROCEDURE — 6370000000 HC RX 637 (ALT 250 FOR IP): Performed by: EMERGENCY MEDICINE

## 2023-10-19 PROCEDURE — 71045 X-RAY EXAM CHEST 1 VIEW: CPT

## 2023-10-19 PROCEDURE — 92526 ORAL FUNCTION THERAPY: CPT

## 2023-10-19 PROCEDURE — 80048 BASIC METABOLIC PNL TOTAL CA: CPT

## 2023-10-19 PROCEDURE — APPSS30 APP SPLIT SHARED TIME 16-30 MINUTES: Performed by: NURSE PRACTITIONER

## 2023-10-19 PROCEDURE — 82330 ASSAY OF CALCIUM: CPT

## 2023-10-19 PROCEDURE — 99024 POSTOP FOLLOW-UP VISIT: CPT | Performed by: NURSE PRACTITIONER

## 2023-10-19 PROCEDURE — 36415 COLL VENOUS BLD VENIPUNCTURE: CPT

## 2023-10-19 PROCEDURE — 6360000002 HC RX W HCPCS: Performed by: NURSE PRACTITIONER

## 2023-10-19 PROCEDURE — 6370000000 HC RX 637 (ALT 250 FOR IP): Performed by: STUDENT IN AN ORGANIZED HEALTH CARE EDUCATION/TRAINING PROGRAM

## 2023-10-19 PROCEDURE — 2000000000 HC ICU R&B

## 2023-10-19 RX ORDER — AMLODIPINE BESYLATE 10 MG/1
10 TABLET ORAL DAILY
Status: DISCONTINUED | OUTPATIENT
Start: 2023-10-19 | End: 2023-10-24 | Stop reason: HOSPADM

## 2023-10-19 RX ORDER — VERAPAMIL HYDROCHLORIDE 2.5 MG/ML
5 INJECTION, SOLUTION INTRAVENOUS ONCE
Status: DISCONTINUED | OUTPATIENT
Start: 2023-10-19 | End: 2023-10-20

## 2023-10-19 RX ORDER — CLONIDINE 0.2 MG/24H
1 PATCH, EXTENDED RELEASE TRANSDERMAL WEEKLY
Status: DISCONTINUED | OUTPATIENT
Start: 2023-10-19 | End: 2023-10-24 | Stop reason: HOSPADM

## 2023-10-19 RX ORDER — BUMETANIDE 0.25 MG/ML
0.5 INJECTION INTRAMUSCULAR; INTRAVENOUS ONCE
Status: COMPLETED | OUTPATIENT
Start: 2023-10-19 | End: 2023-10-19

## 2023-10-19 RX ORDER — LANOLIN ALCOHOL/MO/W.PET/CERES
100 CREAM (GRAM) TOPICAL DAILY
Status: DISCONTINUED | OUTPATIENT
Start: 2023-10-19 | End: 2023-10-24 | Stop reason: HOSPADM

## 2023-10-19 RX ORDER — CALCIUM GLUCONATE 20 MG/ML
2000 INJECTION, SOLUTION INTRAVENOUS ONCE
Status: COMPLETED | OUTPATIENT
Start: 2023-10-19 | End: 2023-10-19

## 2023-10-19 RX ORDER — FOLIC ACID 1 MG/1
1 TABLET ORAL DAILY
Status: DISCONTINUED | OUTPATIENT
Start: 2023-10-19 | End: 2023-10-24 | Stop reason: HOSPADM

## 2023-10-19 RX ORDER — CLONIDINE HYDROCHLORIDE 0.2 MG/1
0.2 TABLET ORAL 3 TIMES DAILY
Status: DISCONTINUED | OUTPATIENT
Start: 2023-10-19 | End: 2023-10-19

## 2023-10-19 RX ADMIN — AMLODIPINE BESYLATE 10 MG: 10 TABLET ORAL at 08:42

## 2023-10-19 RX ADMIN — SODIUM CHLORIDE 10 MG/HR: 9 INJECTION, SOLUTION INTRAVENOUS at 18:27

## 2023-10-19 RX ADMIN — PIPERACILLIN AND TAZOBACTAM 3375 MG: 3; .375 INJECTION, POWDER, LYOPHILIZED, FOR SOLUTION INTRAVENOUS at 21:02

## 2023-10-19 RX ADMIN — ACETAMINOPHEN 650 MG: 325 TABLET ORAL at 00:06

## 2023-10-19 RX ADMIN — LEVOTHYROXINE SODIUM 50 MCG: 0.05 TABLET ORAL at 05:27

## 2023-10-19 RX ADMIN — LEVETIRACETAM 500 MG: 100 INJECTION, SOLUTION INTRAVENOUS at 22:39

## 2023-10-19 RX ADMIN — SODIUM CHLORIDE 12.5 MG/HR: 9 INJECTION, SOLUTION INTRAVENOUS at 00:07

## 2023-10-19 RX ADMIN — SODIUM CHLORIDE 12.5 MG/HR: 9 INJECTION, SOLUTION INTRAVENOUS at 02:12

## 2023-10-19 RX ADMIN — SODIUM CHLORIDE 15 MG/HR: 9 INJECTION, SOLUTION INTRAVENOUS at 20:51

## 2023-10-19 RX ADMIN — SODIUM CHLORIDE 5 MG/HR: 9 INJECTION, SOLUTION INTRAVENOUS at 14:49

## 2023-10-19 RX ADMIN — FOLIC ACID 1 MG: 1 TABLET ORAL at 09:32

## 2023-10-19 RX ADMIN — PIPERACILLIN AND TAZOBACTAM 3375 MG: 3; .375 INJECTION, POWDER, LYOPHILIZED, FOR SOLUTION INTRAVENOUS at 12:41

## 2023-10-19 RX ADMIN — SODIUM CHLORIDE, PRESERVATIVE FREE 10 ML: 5 INJECTION INTRAVENOUS at 08:43

## 2023-10-19 RX ADMIN — SODIUM CHLORIDE 10 MG/HR: 9 INJECTION, SOLUTION INTRAVENOUS at 06:20

## 2023-10-19 RX ADMIN — DAKIN'S SOLUTION 0.125% (QUARTER STRENGTH): 0.12 SOLUTION at 08:43

## 2023-10-19 RX ADMIN — BUMETANIDE 0.5 MG: 0.25 INJECTION INTRAMUSCULAR; INTRAVENOUS at 08:42

## 2023-10-19 RX ADMIN — SODIUM CHLORIDE 15 MG/HR: 9 INJECTION, SOLUTION INTRAVENOUS at 22:37

## 2023-10-19 RX ADMIN — SODIUM CHLORIDE 12.5 MG/HR: 9 INJECTION, SOLUTION INTRAVENOUS at 04:14

## 2023-10-19 RX ADMIN — METHOCARBAMOL 750 MG: 500 TABLET ORAL at 08:43

## 2023-10-19 RX ADMIN — HYDROMORPHONE HYDROCHLORIDE 1 MG: 1 INJECTION, SOLUTION INTRAMUSCULAR; INTRAVENOUS; SUBCUTANEOUS at 17:03

## 2023-10-19 RX ADMIN — SODIUM CHLORIDE 7.5 MG/HR: 9 INJECTION, SOLUTION INTRAVENOUS at 10:36

## 2023-10-19 RX ADMIN — CALCIUM GLUCONATE 2000 MG: 20 INJECTION, SOLUTION INTRAVENOUS at 09:31

## 2023-10-19 RX ADMIN — CLONIDINE HYDROCHLORIDE 0.1 MG: 0.1 TABLET ORAL at 08:43

## 2023-10-19 RX ADMIN — LANSOPRAZOLE 30 MG: 30 TABLET, ORALLY DISINTEGRATING, DELAYED RELEASE ORAL at 08:43

## 2023-10-19 RX ADMIN — PIPERACILLIN AND TAZOBACTAM 3375 MG: 3; .375 INJECTION, POWDER, LYOPHILIZED, FOR SOLUTION INTRAVENOUS at 05:26

## 2023-10-19 RX ADMIN — HYDROMORPHONE HYDROCHLORIDE 1 MG: 1 INJECTION, SOLUTION INTRAMUSCULAR; INTRAVENOUS; SUBCUTANEOUS at 10:41

## 2023-10-19 RX ADMIN — HYDROMORPHONE HYDROCHLORIDE 1 MG: 1 INJECTION, SOLUTION INTRAMUSCULAR; INTRAVENOUS; SUBCUTANEOUS at 03:50

## 2023-10-19 RX ADMIN — POTASSIUM BICARBONATE 40 MEQ: 782 TABLET, EFFERVESCENT ORAL at 06:38

## 2023-10-19 RX ADMIN — ACETAMINOPHEN 650 MG: 325 TABLET ORAL at 08:42

## 2023-10-19 RX ADMIN — LEVETIRACETAM 500 MG: 100 INJECTION, SOLUTION INTRAVENOUS at 11:49

## 2023-10-19 RX ADMIN — Medication 100 MG: at 09:32

## 2023-10-19 RX ADMIN — HYDROMORPHONE HYDROCHLORIDE 1 MG: 1 INJECTION, SOLUTION INTRAMUSCULAR; INTRAVENOUS; SUBCUTANEOUS at 20:03

## 2023-10-19 RX ADMIN — Medication 1 TABLET: at 08:43

## 2023-10-19 RX ADMIN — SODIUM CHLORIDE, PRESERVATIVE FREE 10 ML: 5 INJECTION INTRAVENOUS at 20:04

## 2023-10-19 ASSESSMENT — ENCOUNTER SYMPTOMS
VOMITING: 0
NAUSEA: 0
SHORTNESS OF BREATH: 0
COUGH: 1

## 2023-10-19 ASSESSMENT — PAIN DESCRIPTION - LOCATION
LOCATION: BUTTOCKS
LOCATION: BACK;BUTTOCKS

## 2023-10-19 ASSESSMENT — PAIN SCALES - GENERAL
PAINLEVEL_OUTOF10: 7
PAINLEVEL_OUTOF10: 0
PAINLEVEL_OUTOF10: 7

## 2023-10-19 ASSESSMENT — PAIN DESCRIPTION - DESCRIPTORS: DESCRIPTORS: DISCOMFORT;SORE

## 2023-10-19 ASSESSMENT — PAIN DESCRIPTION - ORIENTATION: ORIENTATION: LEFT

## 2023-10-20 ENCOUNTER — APPOINTMENT (OUTPATIENT)
Dept: GENERAL RADIOLOGY | Age: 43
DRG: 710 | End: 2023-10-20
Payer: COMMERCIAL

## 2023-10-20 LAB
ANION GAP SERPL CALC-SCNC: 12 MEQ/L (ref 8–16)
ANION GAP SERPL CALC-SCNC: 12 MEQ/L (ref 8–16)
BUN SERPL-MCNC: 12 MG/DL (ref 7–22)
BUN SERPL-MCNC: 12 MG/DL (ref 7–22)
CA-I BLD ISE-SCNC: 1.05 MMOL/L (ref 1.12–1.32)
CA-I BLD ISE-SCNC: 1.1 MMOL/L (ref 1.12–1.32)
CALCIUM SERPL-MCNC: 7.6 MG/DL (ref 8.5–10.5)
CALCIUM SERPL-MCNC: 7.7 MG/DL (ref 8.5–10.5)
CHLORIDE SERPL-SCNC: 117 MEQ/L (ref 98–111)
CHLORIDE SERPL-SCNC: 117 MEQ/L (ref 98–111)
CO2 SERPL-SCNC: 21 MEQ/L (ref 23–33)
CO2 SERPL-SCNC: 22 MEQ/L (ref 23–33)
CREAT SERPL-MCNC: 0.7 MG/DL (ref 0.4–1.2)
CREAT SERPL-MCNC: 0.7 MG/DL (ref 0.4–1.2)
DEPRECATED RDW RBC AUTO: 51 FL (ref 35–45)
ERYTHROCYTE [DISTWIDTH] IN BLOOD BY AUTOMATED COUNT: 16.3 % (ref 11.5–14.5)
GFR SERPL CREATININE-BSD FRML MDRD: > 60 ML/MIN/1.73M2
GFR SERPL CREATININE-BSD FRML MDRD: > 60 ML/MIN/1.73M2
GLUCOSE SERPL-MCNC: 102 MG/DL (ref 70–108)
GLUCOSE SERPL-MCNC: 89 MG/DL (ref 70–108)
HCT VFR BLD AUTO: 34.1 % (ref 42–52)
HGB BLD-MCNC: 11.2 GM/DL (ref 14–18)
MAGNESIUM SERPL-MCNC: 1.2 MG/DL (ref 1.6–2.4)
MAGNESIUM SERPL-MCNC: 1.5 MG/DL (ref 1.6–2.4)
MCH RBC QN AUTO: 28.6 PG (ref 26–33)
MCHC RBC AUTO-ENTMCNC: 32.8 GM/DL (ref 32.2–35.5)
MCV RBC AUTO: 87 FL (ref 80–94)
PHOSPHATE SERPL-MCNC: 3.1 MG/DL (ref 2.4–4.7)
PLATELET # BLD AUTO: 230 THOU/MM3 (ref 130–400)
PMV BLD AUTO: 10.5 FL (ref 9.4–12.4)
POTASSIUM SERPL-SCNC: 3.1 MEQ/L (ref 3.5–5.2)
POTASSIUM SERPL-SCNC: 3.4 MEQ/L (ref 3.5–5.2)
RBC # BLD AUTO: 3.92 MILL/MM3 (ref 4.7–6.1)
SODIUM SERPL-SCNC: 150 MEQ/L (ref 135–145)
SODIUM SERPL-SCNC: 151 MEQ/L (ref 135–145)
WBC # BLD AUTO: 10.4 THOU/MM3 (ref 4.8–10.8)

## 2023-10-20 PROCEDURE — 84100 ASSAY OF PHOSPHORUS: CPT

## 2023-10-20 PROCEDURE — 2500000003 HC RX 250 WO HCPCS: Performed by: STUDENT IN AN ORGANIZED HEALTH CARE EDUCATION/TRAINING PROGRAM

## 2023-10-20 PROCEDURE — 6360000002 HC RX W HCPCS

## 2023-10-20 PROCEDURE — 6370000000 HC RX 637 (ALT 250 FOR IP): Performed by: EMERGENCY MEDICINE

## 2023-10-20 PROCEDURE — 2580000003 HC RX 258: Performed by: NURSE PRACTITIONER

## 2023-10-20 PROCEDURE — 97167 OT EVAL HIGH COMPLEX 60 MIN: CPT

## 2023-10-20 PROCEDURE — 6370000000 HC RX 637 (ALT 250 FOR IP): Performed by: SURGERY

## 2023-10-20 PROCEDURE — 6360000002 HC RX W HCPCS: Performed by: NURSE PRACTITIONER

## 2023-10-20 PROCEDURE — 2500000003 HC RX 250 WO HCPCS: Performed by: NURSE PRACTITIONER

## 2023-10-20 PROCEDURE — 92526 ORAL FUNCTION THERAPY: CPT

## 2023-10-20 PROCEDURE — 6360000002 HC RX W HCPCS: Performed by: EMERGENCY MEDICINE

## 2023-10-20 PROCEDURE — 97129 THER IVNTJ 1ST 15 MIN: CPT

## 2023-10-20 PROCEDURE — 82330 ASSAY OF CALCIUM: CPT

## 2023-10-20 PROCEDURE — 2580000003 HC RX 258: Performed by: EMERGENCY MEDICINE

## 2023-10-20 PROCEDURE — 80048 BASIC METABOLIC PNL TOTAL CA: CPT

## 2023-10-20 PROCEDURE — APPSS30 APP SPLIT SHARED TIME 16-30 MINUTES: Performed by: NURSE PRACTITIONER

## 2023-10-20 PROCEDURE — 6370000000 HC RX 637 (ALT 250 FOR IP): Performed by: NURSE PRACTITIONER

## 2023-10-20 PROCEDURE — 2060000000 HC ICU INTERMEDIATE R&B

## 2023-10-20 PROCEDURE — 99233 SBSQ HOSP IP/OBS HIGH 50: CPT | Performed by: INTERNAL MEDICINE

## 2023-10-20 PROCEDURE — 83735 ASSAY OF MAGNESIUM: CPT

## 2023-10-20 PROCEDURE — 36592 COLLECT BLOOD FROM PICC: CPT

## 2023-10-20 PROCEDURE — 99024 POSTOP FOLLOW-UP VISIT: CPT | Performed by: NURSE PRACTITIONER

## 2023-10-20 PROCEDURE — 71045 X-RAY EXAM CHEST 1 VIEW: CPT

## 2023-10-20 PROCEDURE — 2580000003 HC RX 258: Performed by: STUDENT IN AN ORGANIZED HEALTH CARE EDUCATION/TRAINING PROGRAM

## 2023-10-20 PROCEDURE — 36415 COLL VENOUS BLD VENIPUNCTURE: CPT

## 2023-10-20 PROCEDURE — 85027 COMPLETE CBC AUTOMATED: CPT

## 2023-10-20 RX ORDER — DOXAZOSIN MESYLATE 1 MG/1
1 TABLET ORAL NIGHTLY
Status: DISCONTINUED | OUTPATIENT
Start: 2023-10-20 | End: 2023-10-24 | Stop reason: HOSPADM

## 2023-10-20 RX ORDER — POTASSIUM CHLORIDE 29.8 MG/ML
20 INJECTION INTRAVENOUS ONCE
Status: COMPLETED | OUTPATIENT
Start: 2023-10-20 | End: 2023-10-20

## 2023-10-20 RX ORDER — MAGNESIUM SULFATE IN WATER 40 MG/ML
2000 INJECTION, SOLUTION INTRAVENOUS PRN
Status: DISCONTINUED | OUTPATIENT
Start: 2023-10-20 | End: 2023-10-24 | Stop reason: HOSPADM

## 2023-10-20 RX ORDER — HYDROCHLOROTHIAZIDE 25 MG/1
25 TABLET ORAL DAILY
Status: DISCONTINUED | OUTPATIENT
Start: 2023-10-20 | End: 2023-10-24 | Stop reason: HOSPADM

## 2023-10-20 RX ORDER — POTASSIUM CHLORIDE 7.45 MG/ML
10 INJECTION INTRAVENOUS ONCE
Status: COMPLETED | OUTPATIENT
Start: 2023-10-20 | End: 2023-10-20

## 2023-10-20 RX ORDER — LABETALOL HYDROCHLORIDE 5 MG/ML
10 INJECTION INTRAVENOUS EVERY 6 HOURS PRN
Status: DISCONTINUED | OUTPATIENT
Start: 2023-10-20 | End: 2023-10-24 | Stop reason: HOSPADM

## 2023-10-20 RX ORDER — POTASSIUM CHLORIDE 7.45 MG/ML
10 INJECTION INTRAVENOUS PRN
Status: DISCONTINUED | OUTPATIENT
Start: 2023-10-20 | End: 2023-10-24 | Stop reason: HOSPADM

## 2023-10-20 RX ORDER — POTASSIUM CHLORIDE 29.8 MG/ML
20 INJECTION INTRAVENOUS PRN
Status: DISCONTINUED | OUTPATIENT
Start: 2023-10-20 | End: 2023-10-24 | Stop reason: HOSPADM

## 2023-10-20 RX ORDER — CALCIUM GLUCONATE 20 MG/ML
2000 INJECTION, SOLUTION INTRAVENOUS ONCE
Status: COMPLETED | OUTPATIENT
Start: 2023-10-20 | End: 2023-10-20

## 2023-10-20 RX ADMIN — ACETAMINOPHEN 650 MG: 325 TABLET ORAL at 15:00

## 2023-10-20 RX ADMIN — SODIUM CHLORIDE 15 MG/HR: 9 INJECTION, SOLUTION INTRAVENOUS at 14:41

## 2023-10-20 RX ADMIN — CALCIUM GLUCONATE 2000 MG: 20 INJECTION, SOLUTION INTRAVENOUS at 04:03

## 2023-10-20 RX ADMIN — SODIUM CHLORIDE 12.5 MG/HR: 9 INJECTION, SOLUTION INTRAVENOUS at 02:46

## 2023-10-20 RX ADMIN — SODIUM CHLORIDE 15 MG/HR: 9 INJECTION, SOLUTION INTRAVENOUS at 13:01

## 2023-10-20 RX ADMIN — POTASSIUM CHLORIDE 20 MEQ: 29.8 INJECTION, SOLUTION INTRAVENOUS at 17:30

## 2023-10-20 RX ADMIN — HYDROMORPHONE HYDROCHLORIDE 1 MG: 1 INJECTION, SOLUTION INTRAMUSCULAR; INTRAVENOUS; SUBCUTANEOUS at 02:49

## 2023-10-20 RX ADMIN — LEVETIRACETAM 500 MG: 100 INJECTION, SOLUTION INTRAVENOUS at 12:36

## 2023-10-20 RX ADMIN — HYDROCHLOROTHIAZIDE 25 MG: 25 TABLET ORAL at 17:33

## 2023-10-20 RX ADMIN — SODIUM CHLORIDE 15 MG/HR: 9 INJECTION, SOLUTION INTRAVENOUS at 04:38

## 2023-10-20 RX ADMIN — POTASSIUM CHLORIDE 20 MEQ: 29.8 INJECTION, SOLUTION INTRAVENOUS at 16:21

## 2023-10-20 RX ADMIN — POTASSIUM CHLORIDE 10 MEQ: 7.46 INJECTION, SOLUTION INTRAVENOUS at 04:32

## 2023-10-20 RX ADMIN — METHOCARBAMOL 750 MG: 500 TABLET ORAL at 15:00

## 2023-10-20 RX ADMIN — HYDROMORPHONE HYDROCHLORIDE 1 MG: 1 INJECTION, SOLUTION INTRAMUSCULAR; INTRAVENOUS; SUBCUTANEOUS at 22:10

## 2023-10-20 RX ADMIN — ACETAMINOPHEN 650 MG: 325 TABLET ORAL at 10:40

## 2023-10-20 RX ADMIN — LEVOTHYROXINE SODIUM 50 MCG: 0.05 TABLET ORAL at 10:41

## 2023-10-20 RX ADMIN — METHOCARBAMOL 750 MG: 500 TABLET ORAL at 17:33

## 2023-10-20 RX ADMIN — SODIUM CHLORIDE, PRESERVATIVE FREE 10 ML: 5 INJECTION INTRAVENOUS at 21:12

## 2023-10-20 RX ADMIN — Medication 100 MG: at 10:41

## 2023-10-20 RX ADMIN — METHOCARBAMOL 750 MG: 500 TABLET ORAL at 20:57

## 2023-10-20 RX ADMIN — PIPERACILLIN AND TAZOBACTAM 3375 MG: 3; .375 INJECTION, POWDER, LYOPHILIZED, FOR SOLUTION INTRAVENOUS at 04:42

## 2023-10-20 RX ADMIN — MAGNESIUM SULFATE HEPTAHYDRATE 2000 MG: 40 INJECTION, SOLUTION INTRAVENOUS at 16:24

## 2023-10-20 RX ADMIN — CALCIUM GLUCONATE 2000 MG: 20 INJECTION, SOLUTION INTRAVENOUS at 17:32

## 2023-10-20 RX ADMIN — MAGNESIUM SULFATE HEPTAHYDRATE 2000 MG: 40 INJECTION, SOLUTION INTRAVENOUS at 05:45

## 2023-10-20 RX ADMIN — Medication 1 TABLET: at 10:41

## 2023-10-20 RX ADMIN — DOXAZOSIN 1 MG: 1 TABLET ORAL at 20:58

## 2023-10-20 RX ADMIN — PIPERACILLIN AND TAZOBACTAM 3375 MG: 3; .375 INJECTION, POWDER, LYOPHILIZED, FOR SOLUTION INTRAVENOUS at 13:03

## 2023-10-20 RX ADMIN — SODIUM CHLORIDE, PRESERVATIVE FREE 10 ML: 5 INJECTION INTRAVENOUS at 08:53

## 2023-10-20 RX ADMIN — SODIUM CHLORIDE 12.5 MG/HR: 9 INJECTION, SOLUTION INTRAVENOUS at 00:32

## 2023-10-20 RX ADMIN — DAKIN'S SOLUTION 0.125% (QUARTER STRENGTH): 0.12 SOLUTION at 08:53

## 2023-10-20 RX ADMIN — SODIUM CHLORIDE 15 MG/HR: 9 INJECTION, SOLUTION INTRAVENOUS at 08:52

## 2023-10-20 RX ADMIN — PIPERACILLIN AND TAZOBACTAM 3375 MG: 3; .375 INJECTION, POWDER, LYOPHILIZED, FOR SOLUTION INTRAVENOUS at 21:14

## 2023-10-20 RX ADMIN — SODIUM CHLORIDE 14 MG/HR: 9 INJECTION, SOLUTION INTRAVENOUS at 16:54

## 2023-10-20 RX ADMIN — METHOCARBAMOL 750 MG: 500 TABLET ORAL at 10:41

## 2023-10-20 RX ADMIN — SODIUM CHLORIDE 15 MG/HR: 9 INJECTION, SOLUTION INTRAVENOUS at 10:56

## 2023-10-20 RX ADMIN — AMLODIPINE BESYLATE 10 MG: 10 TABLET ORAL at 10:41

## 2023-10-20 RX ADMIN — POTASSIUM CHLORIDE 20 MEQ: 29.8 INJECTION, SOLUTION INTRAVENOUS at 03:57

## 2023-10-20 RX ADMIN — POTASSIUM CHLORIDE 20 MEQ: 29.8 INJECTION, SOLUTION INTRAVENOUS at 05:41

## 2023-10-20 RX ADMIN — HYDROMORPHONE HYDROCHLORIDE 1 MG: 1 INJECTION, SOLUTION INTRAMUSCULAR; INTRAVENOUS; SUBCUTANEOUS at 12:10

## 2023-10-20 RX ADMIN — POTASSIUM CHLORIDE 20 MEQ: 29.8 INJECTION, SOLUTION INTRAVENOUS at 06:37

## 2023-10-20 RX ADMIN — SODIUM CHLORIDE 15 MG/HR: 9 INJECTION, SOLUTION INTRAVENOUS at 06:36

## 2023-10-20 RX ADMIN — LEVETIRACETAM 500 MG: 100 INJECTION, SOLUTION INTRAVENOUS at 23:37

## 2023-10-20 RX ADMIN — ACETAMINOPHEN 650 MG: 325 TABLET ORAL at 20:56

## 2023-10-20 RX ADMIN — FOLIC ACID 1 MG: 1 TABLET ORAL at 10:42

## 2023-10-20 ASSESSMENT — PAIN SCALES - GENERAL
PAINLEVEL_OUTOF10: 8
PAINLEVEL_OUTOF10: 3
PAINLEVEL_OUTOF10: 8
PAINLEVEL_OUTOF10: 5
PAINLEVEL_OUTOF10: 5
PAINLEVEL_OUTOF10: 8
PAINLEVEL_OUTOF10: 8

## 2023-10-20 ASSESSMENT — ENCOUNTER SYMPTOMS
BACK PAIN: 1
DIARRHEA: 0
ABDOMINAL DISTENTION: 0
EYE REDNESS: 0
TROUBLE SWALLOWING: 1
NAUSEA: 0
ABDOMINAL PAIN: 0
COLOR CHANGE: 0
CONSTIPATION: 0
CHEST TIGHTNESS: 0
SHORTNESS OF BREATH: 0
COUGH: 0

## 2023-10-20 ASSESSMENT — PAIN DESCRIPTION - LOCATION
LOCATION: LEG;MOUTH
LOCATION: LEG;MOUTH
LOCATION: MOUTH;LEG
LOCATION: LEG

## 2023-10-20 ASSESSMENT — PAIN DESCRIPTION - ORIENTATION
ORIENTATION: LEFT

## 2023-10-20 ASSESSMENT — PAIN DESCRIPTION - PAIN TYPE
TYPE: ACUTE PAIN
TYPE: ACUTE PAIN

## 2023-10-20 ASSESSMENT — PAIN - FUNCTIONAL ASSESSMENT
PAIN_FUNCTIONAL_ASSESSMENT: ACTIVITIES ARE NOT PREVENTED
PAIN_FUNCTIONAL_ASSESSMENT: PREVENTS OR INTERFERES SOME ACTIVE ACTIVITIES AND ADLS
PAIN_FUNCTIONAL_ASSESSMENT: ACTIVITIES ARE NOT PREVENTED
PAIN_FUNCTIONAL_ASSESSMENT: ACTIVITIES ARE NOT PREVENTED

## 2023-10-20 ASSESSMENT — PAIN DESCRIPTION - DESCRIPTORS
DESCRIPTORS: DISCOMFORT;SORE
DESCRIPTORS: ACHING;SORE

## 2023-10-20 ASSESSMENT — PAIN DESCRIPTION - ONSET
ONSET: ON-GOING
ONSET: ON-GOING

## 2023-10-20 ASSESSMENT — PAIN DESCRIPTION - FREQUENCY
FREQUENCY: CONTINUOUS
FREQUENCY: CONTINUOUS

## 2023-10-20 NOTE — SIGNIFICANT EVENT
Patient remains stable for transfer to step-down 4A room 8. Handoff report given to hospitalist admitter, Dr. Pretty Lennon. Discussed with Dr. Georgiann Leventhal.     Electronically signed by FRANCE Man CNP on 10/20/2023 at 3:44 PM

## 2023-10-21 LAB
ANION GAP SERPL CALC-SCNC: 10 MEQ/L (ref 8–16)
BUN SERPL-MCNC: 14 MG/DL (ref 7–22)
CA-I BLD ISE-SCNC: 1.11 MMOL/L (ref 1.12–1.32)
CALCIUM SERPL-MCNC: 8 MG/DL (ref 8.5–10.5)
CHLORIDE SERPL-SCNC: 114 MEQ/L (ref 98–111)
CO2 SERPL-SCNC: 23 MEQ/L (ref 23–33)
CREAT SERPL-MCNC: 0.7 MG/DL (ref 0.4–1.2)
DEPRECATED RDW RBC AUTO: 51.7 FL (ref 35–45)
ERYTHROCYTE [DISTWIDTH] IN BLOOD BY AUTOMATED COUNT: 16.1 % (ref 11.5–14.5)
GFR SERPL CREATININE-BSD FRML MDRD: > 60 ML/MIN/1.73M2
GLUCOSE BLD STRIP.AUTO-MCNC: 110 MG/DL (ref 70–108)
GLUCOSE BLD STRIP.AUTO-MCNC: 123 MG/DL (ref 70–108)
GLUCOSE SERPL-MCNC: 114 MG/DL (ref 70–108)
HCT VFR BLD AUTO: 31.3 % (ref 42–52)
HGB BLD-MCNC: 10.1 GM/DL (ref 14–18)
MCH RBC QN AUTO: 28.4 PG (ref 26–33)
MCHC RBC AUTO-ENTMCNC: 32.3 GM/DL (ref 32.2–35.5)
MCV RBC AUTO: 87.9 FL (ref 80–94)
PLATELET # BLD AUTO: 243 THOU/MM3 (ref 130–400)
PMV BLD AUTO: 10.3 FL (ref 9.4–12.4)
POTASSIUM SERPL-SCNC: 3.6 MEQ/L (ref 3.5–5.2)
RBC # BLD AUTO: 3.56 MILL/MM3 (ref 4.7–6.1)
SODIUM SERPL-SCNC: 147 MEQ/L (ref 135–145)
WBC # BLD AUTO: 9 THOU/MM3 (ref 4.8–10.8)

## 2023-10-21 PROCEDURE — 97163 PT EVAL HIGH COMPLEX 45 MIN: CPT

## 2023-10-21 PROCEDURE — 82948 REAGENT STRIP/BLOOD GLUCOSE: CPT

## 2023-10-21 PROCEDURE — 2580000003 HC RX 258: Performed by: STUDENT IN AN ORGANIZED HEALTH CARE EDUCATION/TRAINING PROGRAM

## 2023-10-21 PROCEDURE — 2580000003 HC RX 258: Performed by: NURSE PRACTITIONER

## 2023-10-21 PROCEDURE — 2500000003 HC RX 250 WO HCPCS: Performed by: NURSE PRACTITIONER

## 2023-10-21 PROCEDURE — 97110 THERAPEUTIC EXERCISES: CPT

## 2023-10-21 PROCEDURE — 36415 COLL VENOUS BLD VENIPUNCTURE: CPT

## 2023-10-21 PROCEDURE — 6370000000 HC RX 637 (ALT 250 FOR IP): Performed by: STUDENT IN AN ORGANIZED HEALTH CARE EDUCATION/TRAINING PROGRAM

## 2023-10-21 PROCEDURE — 2060000000 HC ICU INTERMEDIATE R&B

## 2023-10-21 PROCEDURE — 6370000000 HC RX 637 (ALT 250 FOR IP): Performed by: NURSE PRACTITIONER

## 2023-10-21 PROCEDURE — 85027 COMPLETE CBC AUTOMATED: CPT

## 2023-10-21 PROCEDURE — 2580000003 HC RX 258: Performed by: EMERGENCY MEDICINE

## 2023-10-21 PROCEDURE — 82330 ASSAY OF CALCIUM: CPT

## 2023-10-21 PROCEDURE — 6360000002 HC RX W HCPCS: Performed by: STUDENT IN AN ORGANIZED HEALTH CARE EDUCATION/TRAINING PROGRAM

## 2023-10-21 PROCEDURE — 6360000002 HC RX W HCPCS: Performed by: NURSE PRACTITIONER

## 2023-10-21 PROCEDURE — 99233 SBSQ HOSP IP/OBS HIGH 50: CPT | Performed by: STUDENT IN AN ORGANIZED HEALTH CARE EDUCATION/TRAINING PROGRAM

## 2023-10-21 PROCEDURE — 6370000000 HC RX 637 (ALT 250 FOR IP): Performed by: EMERGENCY MEDICINE

## 2023-10-21 PROCEDURE — 80048 BASIC METABOLIC PNL TOTAL CA: CPT

## 2023-10-21 RX ORDER — DOXEPIN HYDROCHLORIDE 10 MG/1
10 CAPSULE ORAL NIGHTLY PRN
Status: DISCONTINUED | OUTPATIENT
Start: 2023-10-21 | End: 2023-10-24 | Stop reason: HOSPADM

## 2023-10-21 RX ORDER — LEVETIRACETAM 100 MG/ML
500 SOLUTION ORAL 2 TIMES DAILY
Status: DISCONTINUED | OUTPATIENT
Start: 2023-10-21 | End: 2023-10-24 | Stop reason: HOSPADM

## 2023-10-21 RX ADMIN — DAKIN'S SOLUTION 0.125% (QUARTER STRENGTH): 0.12 SOLUTION at 12:30

## 2023-10-21 RX ADMIN — DOXAZOSIN 1 MG: 1 TABLET ORAL at 20:33

## 2023-10-21 RX ADMIN — LEVOTHYROXINE SODIUM 50 MCG: 0.05 TABLET ORAL at 06:01

## 2023-10-21 RX ADMIN — LEVETIRACETAM 500 MG: 500 SOLUTION ORAL at 10:55

## 2023-10-21 RX ADMIN — ACETAMINOPHEN 650 MG: 325 TABLET ORAL at 21:50

## 2023-10-21 RX ADMIN — HYDROMORPHONE HYDROCHLORIDE 1 MG: 1 INJECTION, SOLUTION INTRAMUSCULAR; INTRAVENOUS; SUBCUTANEOUS at 02:39

## 2023-10-21 RX ADMIN — HYDROMORPHONE HYDROCHLORIDE 1 MG: 1 INJECTION, SOLUTION INTRAMUSCULAR; INTRAVENOUS; SUBCUTANEOUS at 20:31

## 2023-10-21 RX ADMIN — METHOCARBAMOL 750 MG: 500 TABLET ORAL at 08:22

## 2023-10-21 RX ADMIN — ACETAMINOPHEN 650 MG: 325 TABLET ORAL at 14:18

## 2023-10-21 RX ADMIN — METHOCARBAMOL 750 MG: 500 TABLET ORAL at 14:18

## 2023-10-21 RX ADMIN — ACETAMINOPHEN 650 MG: 325 TABLET ORAL at 08:20

## 2023-10-21 RX ADMIN — LEVETIRACETAM 500 MG: 500 SOLUTION ORAL at 20:44

## 2023-10-21 RX ADMIN — HYDROMORPHONE HYDROCHLORIDE 1 MG: 1 INJECTION, SOLUTION INTRAMUSCULAR; INTRAVENOUS; SUBCUTANEOUS at 12:16

## 2023-10-21 RX ADMIN — ACETAMINOPHEN 650 MG: 325 TABLET ORAL at 04:14

## 2023-10-21 RX ADMIN — PIPERACILLIN AND TAZOBACTAM 3375 MG: 3; .375 INJECTION, POWDER, LYOPHILIZED, FOR SOLUTION INTRAVENOUS at 14:27

## 2023-10-21 RX ADMIN — PIPERACILLIN AND TAZOBACTAM 3375 MG: 3; .375 INJECTION, POWDER, LYOPHILIZED, FOR SOLUTION INTRAVENOUS at 20:31

## 2023-10-21 RX ADMIN — FOLIC ACID 1 MG: 1 TABLET ORAL at 08:22

## 2023-10-21 RX ADMIN — Medication 1 TABLET: at 08:24

## 2023-10-21 RX ADMIN — Medication 100 MG: at 08:26

## 2023-10-21 RX ADMIN — AMLODIPINE BESYLATE 10 MG: 10 TABLET ORAL at 08:21

## 2023-10-21 RX ADMIN — PIPERACILLIN AND TAZOBACTAM 3375 MG: 3; .375 INJECTION, POWDER, LYOPHILIZED, FOR SOLUTION INTRAVENOUS at 05:56

## 2023-10-21 RX ADMIN — HYDROCHLOROTHIAZIDE 25 MG: 25 TABLET ORAL at 08:22

## 2023-10-21 RX ADMIN — SODIUM CHLORIDE, PRESERVATIVE FREE 10 ML: 5 INJECTION INTRAVENOUS at 08:26

## 2023-10-21 RX ADMIN — HYDROMORPHONE HYDROCHLORIDE 1 MG: 1 INJECTION, SOLUTION INTRAMUSCULAR; INTRAVENOUS; SUBCUTANEOUS at 23:37

## 2023-10-21 RX ADMIN — HYDROMORPHONE HYDROCHLORIDE 1 MG: 1 INJECTION, SOLUTION INTRAMUSCULAR; INTRAVENOUS; SUBCUTANEOUS at 08:17

## 2023-10-21 RX ADMIN — SODIUM CHLORIDE, PRESERVATIVE FREE 10 ML: 5 INJECTION INTRAVENOUS at 20:44

## 2023-10-21 RX ADMIN — METHOCARBAMOL 750 MG: 500 TABLET ORAL at 20:32

## 2023-10-21 RX ADMIN — HYDROMORPHONE HYDROCHLORIDE 1 MG: 1 INJECTION, SOLUTION INTRAMUSCULAR; INTRAVENOUS; SUBCUTANEOUS at 06:01

## 2023-10-21 ASSESSMENT — PAIN SCALES - GENERAL
PAINLEVEL_OUTOF10: 8
PAINLEVEL_OUTOF10: 5
PAINLEVEL_OUTOF10: 8
PAINLEVEL_OUTOF10: 7
PAINLEVEL_OUTOF10: 5
PAINLEVEL_OUTOF10: 4
PAINLEVEL_OUTOF10: 7
PAINLEVEL_OUTOF10: 10
PAINLEVEL_OUTOF10: 8
PAINLEVEL_OUTOF10: 5
PAINLEVEL_OUTOF10: 5
PAINLEVEL_OUTOF10: 4
PAINLEVEL_OUTOF10: 4
PAINLEVEL_OUTOF10: 7
PAINLEVEL_OUTOF10: 8
PAINLEVEL_OUTOF10: 8
PAINLEVEL_OUTOF10: 4
PAINLEVEL_OUTOF10: 8
PAINLEVEL_OUTOF10: 5
PAINLEVEL_OUTOF10: 8

## 2023-10-21 ASSESSMENT — PAIN DESCRIPTION - ONSET
ONSET: ON-GOING

## 2023-10-21 ASSESSMENT — PAIN DESCRIPTION - ORIENTATION
ORIENTATION: LEFT

## 2023-10-21 ASSESSMENT — PAIN - FUNCTIONAL ASSESSMENT
PAIN_FUNCTIONAL_ASSESSMENT: PREVENTS OR INTERFERES SOME ACTIVE ACTIVITIES AND ADLS
PAIN_FUNCTIONAL_ASSESSMENT: PREVENTS OR INTERFERES WITH MANY ACTIVE NOT PASSIVE ACTIVITIES
PAIN_FUNCTIONAL_ASSESSMENT: PREVENTS OR INTERFERES SOME ACTIVE ACTIVITIES AND ADLS

## 2023-10-21 ASSESSMENT — PAIN DESCRIPTION - DESCRIPTORS
DESCRIPTORS: ACHING;SORE
DESCRIPTORS: ACHING;SORE
DESCRIPTORS: SORE;ACHING
DESCRIPTORS: ACHING;SORE
DESCRIPTORS: ACHING;DISCOMFORT
DESCRIPTORS: ACHING;SORE
DESCRIPTORS: ACHING;DISCOMFORT
DESCRIPTORS: ACHING;SORE
DESCRIPTORS: ACHING;SORE

## 2023-10-21 ASSESSMENT — PAIN DESCRIPTION - LOCATION
LOCATION: MOUTH;LEG
LOCATION: LEG;MOUTH
LOCATION: LEG
LOCATION: MOUTH;LEG
LOCATION: MOUTH;LEG
LOCATION: LEG;MOUTH
LOCATION: LEG;MOUTH
LOCATION: MOUTH;LEG
LOCATION: LEG;MOUTH

## 2023-10-21 ASSESSMENT — PAIN DESCRIPTION - FREQUENCY
FREQUENCY: CONTINUOUS

## 2023-10-21 ASSESSMENT — PAIN DESCRIPTION - PAIN TYPE
TYPE: ACUTE PAIN

## 2023-10-21 NOTE — FLOWSHEET NOTE
10/21/23 5139   Safe Environment   Safety Measures Standard Safety Measures  (Virtual Nurse Safety Rounds Complete)     Called in to patients room. Patient lethargic and hard to understand. Camera turned on for safety purposes. Pt denied any questions and fell back asleep.

## 2023-10-22 LAB
ANION GAP SERPL CALC-SCNC: 11 MEQ/L (ref 8–16)
BUN SERPL-MCNC: 17 MG/DL (ref 7–22)
CALCIUM SERPL-MCNC: 8 MG/DL (ref 8.5–10.5)
CHLORIDE SERPL-SCNC: 110 MEQ/L (ref 98–111)
CO2 SERPL-SCNC: 26 MEQ/L (ref 23–33)
CREAT SERPL-MCNC: 0.7 MG/DL (ref 0.4–1.2)
DEPRECATED RDW RBC AUTO: 53.2 FL (ref 35–45)
ERYTHROCYTE [DISTWIDTH] IN BLOOD BY AUTOMATED COUNT: 16.3 % (ref 11.5–14.5)
GFR SERPL CREATININE-BSD FRML MDRD: > 60 ML/MIN/1.73M2
GLUCOSE BLD STRIP.AUTO-MCNC: 106 MG/DL (ref 70–108)
GLUCOSE BLD STRIP.AUTO-MCNC: 70 MG/DL (ref 70–108)
GLUCOSE BLD STRIP.AUTO-MCNC: 97 MG/DL (ref 70–108)
GLUCOSE BLD STRIP.AUTO-MCNC: 99 MG/DL (ref 70–108)
GLUCOSE SERPL-MCNC: 97 MG/DL (ref 70–108)
HCT VFR BLD AUTO: 31.2 % (ref 42–52)
HGB BLD-MCNC: 9.9 GM/DL (ref 14–18)
MAGNESIUM SERPL-MCNC: 1.7 MG/DL (ref 1.6–2.4)
MCH RBC QN AUTO: 28.6 PG (ref 26–33)
MCHC RBC AUTO-ENTMCNC: 31.7 GM/DL (ref 32.2–35.5)
MCV RBC AUTO: 90.2 FL (ref 80–94)
PLATELET # BLD AUTO: 250 THOU/MM3 (ref 130–400)
PMV BLD AUTO: 10.1 FL (ref 9.4–12.4)
POTASSIUM SERPL-SCNC: 3.4 MEQ/L (ref 3.5–5.2)
RBC # BLD AUTO: 3.46 MILL/MM3 (ref 4.7–6.1)
SODIUM SERPL-SCNC: 147 MEQ/L (ref 135–145)
WBC # BLD AUTO: 9.1 THOU/MM3 (ref 4.8–10.8)

## 2023-10-22 PROCEDURE — 83735 ASSAY OF MAGNESIUM: CPT

## 2023-10-22 PROCEDURE — 6370000000 HC RX 637 (ALT 250 FOR IP): Performed by: NURSE PRACTITIONER

## 2023-10-22 PROCEDURE — 36415 COLL VENOUS BLD VENIPUNCTURE: CPT

## 2023-10-22 PROCEDURE — 2580000003 HC RX 258: Performed by: EMERGENCY MEDICINE

## 2023-10-22 PROCEDURE — 6370000000 HC RX 637 (ALT 250 FOR IP): Performed by: EMERGENCY MEDICINE

## 2023-10-22 PROCEDURE — APPSS30 APP SPLIT SHARED TIME 16-30 MINUTES: Performed by: NURSE PRACTITIONER

## 2023-10-22 PROCEDURE — 2580000003 HC RX 258: Performed by: STUDENT IN AN ORGANIZED HEALTH CARE EDUCATION/TRAINING PROGRAM

## 2023-10-22 PROCEDURE — 99232 SBSQ HOSP IP/OBS MODERATE 35: CPT | Performed by: STUDENT IN AN ORGANIZED HEALTH CARE EDUCATION/TRAINING PROGRAM

## 2023-10-22 PROCEDURE — 6360000002 HC RX W HCPCS: Performed by: STUDENT IN AN ORGANIZED HEALTH CARE EDUCATION/TRAINING PROGRAM

## 2023-10-22 PROCEDURE — 2500000003 HC RX 250 WO HCPCS: Performed by: NURSE PRACTITIONER

## 2023-10-22 PROCEDURE — 6370000000 HC RX 637 (ALT 250 FOR IP): Performed by: STUDENT IN AN ORGANIZED HEALTH CARE EDUCATION/TRAINING PROGRAM

## 2023-10-22 PROCEDURE — 80048 BASIC METABOLIC PNL TOTAL CA: CPT

## 2023-10-22 PROCEDURE — 2060000000 HC ICU INTERMEDIATE R&B

## 2023-10-22 PROCEDURE — 82948 REAGENT STRIP/BLOOD GLUCOSE: CPT

## 2023-10-22 PROCEDURE — 6370000000 HC RX 637 (ALT 250 FOR IP): Performed by: SURGERY

## 2023-10-22 PROCEDURE — 85027 COMPLETE CBC AUTOMATED: CPT

## 2023-10-22 RX ADMIN — DAKIN'S SOLUTION 0.125% (QUARTER STRENGTH): 0.12 SOLUTION at 17:30

## 2023-10-22 RX ADMIN — AMLODIPINE BESYLATE 10 MG: 10 TABLET ORAL at 08:16

## 2023-10-22 RX ADMIN — POTASSIUM BICARBONATE 40 MEQ: 782 TABLET, EFFERVESCENT ORAL at 08:17

## 2023-10-22 RX ADMIN — ACETAMINOPHEN 650 MG: 325 TABLET ORAL at 20:17

## 2023-10-22 RX ADMIN — METHOCARBAMOL 750 MG: 500 TABLET ORAL at 17:06

## 2023-10-22 RX ADMIN — HYDROMORPHONE HYDROCHLORIDE 1 MG: 1 INJECTION, SOLUTION INTRAMUSCULAR; INTRAVENOUS; SUBCUTANEOUS at 08:01

## 2023-10-22 RX ADMIN — ACETAMINOPHEN 650 MG: 325 TABLET ORAL at 08:16

## 2023-10-22 RX ADMIN — PIPERACILLIN AND TAZOBACTAM 3375 MG: 3; .375 INJECTION, POWDER, LYOPHILIZED, FOR SOLUTION INTRAVENOUS at 05:02

## 2023-10-22 RX ADMIN — HYDROMORPHONE HYDROCHLORIDE 1 MG: 1 INJECTION, SOLUTION INTRAMUSCULAR; INTRAVENOUS; SUBCUTANEOUS at 03:09

## 2023-10-22 RX ADMIN — PIPERACILLIN AND TAZOBACTAM 3375 MG: 3; .375 INJECTION, POWDER, LYOPHILIZED, FOR SOLUTION INTRAVENOUS at 14:18

## 2023-10-22 RX ADMIN — SODIUM CHLORIDE, PRESERVATIVE FREE 10 ML: 5 INJECTION INTRAVENOUS at 08:19

## 2023-10-22 RX ADMIN — HYDROMORPHONE HYDROCHLORIDE 1 MG: 1 INJECTION, SOLUTION INTRAMUSCULAR; INTRAVENOUS; SUBCUTANEOUS at 17:06

## 2023-10-22 RX ADMIN — FOLIC ACID 1 MG: 1 TABLET ORAL at 08:18

## 2023-10-22 RX ADMIN — PIPERACILLIN AND TAZOBACTAM 3375 MG: 3; .375 INJECTION, POWDER, LYOPHILIZED, FOR SOLUTION INTRAVENOUS at 21:13

## 2023-10-22 RX ADMIN — Medication 1 TABLET: at 08:16

## 2023-10-22 RX ADMIN — Medication 100 MG: at 08:17

## 2023-10-22 RX ADMIN — LEVETIRACETAM 500 MG: 500 SOLUTION ORAL at 20:18

## 2023-10-22 RX ADMIN — LEVETIRACETAM 500 MG: 500 SOLUTION ORAL at 08:17

## 2023-10-22 RX ADMIN — HYDROMORPHONE HYDROCHLORIDE 1 MG: 1 INJECTION, SOLUTION INTRAMUSCULAR; INTRAVENOUS; SUBCUTANEOUS at 12:44

## 2023-10-22 RX ADMIN — HYDROCHLOROTHIAZIDE 25 MG: 25 TABLET ORAL at 08:16

## 2023-10-22 RX ADMIN — SODIUM CHLORIDE, PRESERVATIVE FREE 10 ML: 5 INJECTION INTRAVENOUS at 20:18

## 2023-10-22 RX ADMIN — METHOCARBAMOL 750 MG: 500 TABLET ORAL at 08:16

## 2023-10-22 RX ADMIN — DOXAZOSIN 1 MG: 1 TABLET ORAL at 20:17

## 2023-10-22 RX ADMIN — METHOCARBAMOL 750 MG: 500 TABLET ORAL at 20:18

## 2023-10-22 RX ADMIN — METHOCARBAMOL 750 MG: 500 TABLET ORAL at 12:46

## 2023-10-22 RX ADMIN — LEVOTHYROXINE SODIUM 50 MCG: 0.05 TABLET ORAL at 05:10

## 2023-10-22 RX ADMIN — ACETAMINOPHEN 650 MG: 325 TABLET ORAL at 12:46

## 2023-10-22 RX ADMIN — ACETAMINOPHEN 650 MG: 325 TABLET ORAL at 05:10

## 2023-10-22 ASSESSMENT — PAIN SCALES - GENERAL
PAINLEVEL_OUTOF10: 8
PAINLEVEL_OUTOF10: 8
PAINLEVEL_OUTOF10: 5
PAINLEVEL_OUTOF10: 4
PAINLEVEL_OUTOF10: 5
PAINLEVEL_OUTOF10: 8
PAINLEVEL_OUTOF10: 8
PAINLEVEL_OUTOF10: 5
PAINLEVEL_OUTOF10: 4
PAINLEVEL_OUTOF10: 9
PAINLEVEL_OUTOF10: 5

## 2023-10-22 ASSESSMENT — PAIN DESCRIPTION - PAIN TYPE: TYPE: ACUTE PAIN

## 2023-10-22 ASSESSMENT — PAIN SCALES - WONG BAKER
WONGBAKER_NUMERICALRESPONSE: 0

## 2023-10-22 ASSESSMENT — PAIN DESCRIPTION - DESCRIPTORS
DESCRIPTORS: ACHING;DISCOMFORT
DESCRIPTORS: ACHING;SORE
DESCRIPTORS: ACHING;SORE

## 2023-10-22 ASSESSMENT — PAIN DESCRIPTION - ORIENTATION
ORIENTATION: RIGHT
ORIENTATION: LEFT

## 2023-10-22 ASSESSMENT — PAIN DESCRIPTION - LOCATION
LOCATION: LEG
LOCATION: BUTTOCKS
LOCATION: LEG;MOUTH
LOCATION: HIP
LOCATION: MOUTH;LEG

## 2023-10-22 ASSESSMENT — PAIN DESCRIPTION - ONSET: ONSET: ON-GOING

## 2023-10-22 ASSESSMENT — PAIN - FUNCTIONAL ASSESSMENT
PAIN_FUNCTIONAL_ASSESSMENT: PREVENTS OR INTERFERES SOME ACTIVE ACTIVITIES AND ADLS
PAIN_FUNCTIONAL_ASSESSMENT: PREVENTS OR INTERFERES SOME ACTIVE ACTIVITIES AND ADLS

## 2023-10-22 ASSESSMENT — PAIN DESCRIPTION - FREQUENCY: FREQUENCY: CONTINUOUS

## 2023-10-22 NOTE — FLOWSHEET NOTE
10/22/23 1315   Safe Environment   Safety Measures Bed in low position;Call light within reach; Fall prevention (comment); Side rails X 2;Standard Safety Measures  (Virtual nurse safety round completed.)     Patient is awake and  alert and answers questions. No distress noted. Family at the bedside    Inquired if patient had needs for addressing pain, potty, positioning, access to personal things and any other personal needs. Assisted patient in call light use for bedside nurse for non-emergent need.

## 2023-10-23 ENCOUNTER — ANESTHESIA EVENT (OUTPATIENT)
Dept: OPERATING ROOM | Age: 43
DRG: 710 | End: 2023-10-23
Payer: COMMERCIAL

## 2023-10-23 ENCOUNTER — ANESTHESIA (OUTPATIENT)
Dept: OPERATING ROOM | Age: 43
DRG: 710 | End: 2023-10-23
Payer: COMMERCIAL

## 2023-10-23 LAB
ANION GAP SERPL CALC-SCNC: 9 MEQ/L (ref 8–16)
BUN SERPL-MCNC: 13 MG/DL (ref 7–22)
CALCIUM SERPL-MCNC: 8.3 MG/DL (ref 8.5–10.5)
CHLORIDE SERPL-SCNC: 101 MEQ/L (ref 98–111)
CO2 SERPL-SCNC: 26 MEQ/L (ref 23–33)
CREAT SERPL-MCNC: 0.6 MG/DL (ref 0.4–1.2)
DEPRECATED RDW RBC AUTO: 51.8 FL (ref 35–45)
ERYTHROCYTE [DISTWIDTH] IN BLOOD BY AUTOMATED COUNT: 15.9 % (ref 11.5–14.5)
FERRITIN SERPL IA-MCNC: 250 NG/ML (ref 22–322)
FOLATE SERPL-MCNC: 19.1 NG/ML (ref 4.8–24.2)
GFR SERPL CREATININE-BSD FRML MDRD: > 60 ML/MIN/1.73M2
GLUCOSE SERPL-MCNC: 78 MG/DL (ref 70–108)
HCT VFR BLD AUTO: 30.3 % (ref 42–52)
HGB BLD-MCNC: 9.6 GM/DL (ref 14–18)
IRON SATN MFR SERPL: 6 % (ref 20–50)
IRON SERPL-MCNC: 8 UG/DL (ref 65–195)
MCH RBC QN AUTO: 28.6 PG (ref 26–33)
MCHC RBC AUTO-ENTMCNC: 31.7 GM/DL (ref 32.2–35.5)
MCV RBC AUTO: 90.2 FL (ref 80–94)
PLATELET # BLD AUTO: 301 THOU/MM3 (ref 130–400)
PMV BLD AUTO: 10.2 FL (ref 9.4–12.4)
POTASSIUM SERPL-SCNC: 4.2 MEQ/L (ref 3.5–5.2)
RBC # BLD AUTO: 3.36 MILL/MM3 (ref 4.7–6.1)
SODIUM SERPL-SCNC: 136 MEQ/L (ref 135–145)
TIBC SERPL-MCNC: 129 UG/DL (ref 171–450)
VIT B12 SERPL-MCNC: > 2000 PG/ML (ref 211–911)
WBC # BLD AUTO: 8.9 THOU/MM3 (ref 4.8–10.8)

## 2023-10-23 PROCEDURE — 6370000000 HC RX 637 (ALT 250 FOR IP): Performed by: STUDENT IN AN ORGANIZED HEALTH CARE EDUCATION/TRAINING PROGRAM

## 2023-10-23 PROCEDURE — 82728 ASSAY OF FERRITIN: CPT

## 2023-10-23 PROCEDURE — 6360000002 HC RX W HCPCS: Performed by: ANESTHESIOLOGY

## 2023-10-23 PROCEDURE — APPSS15 APP SPLIT SHARED TIME 0-15 MINUTES: Performed by: NURSE PRACTITIONER

## 2023-10-23 PROCEDURE — 82607 VITAMIN B-12: CPT

## 2023-10-23 PROCEDURE — 3600000002 HC SURGERY LEVEL 2 BASE: Performed by: SURGERY

## 2023-10-23 PROCEDURE — 83550 IRON BINDING TEST: CPT

## 2023-10-23 PROCEDURE — 2580000003 HC RX 258: Performed by: NURSE ANESTHETIST, CERTIFIED REGISTERED

## 2023-10-23 PROCEDURE — 7100000001 HC PACU RECOVERY - ADDTL 15 MIN: Performed by: SURGERY

## 2023-10-23 PROCEDURE — 3600000012 HC SURGERY LEVEL 2 ADDTL 15MIN: Performed by: SURGERY

## 2023-10-23 PROCEDURE — 6360000002 HC RX W HCPCS: Performed by: NURSE ANESTHETIST, CERTIFIED REGISTERED

## 2023-10-23 PROCEDURE — 99232 SBSQ HOSP IP/OBS MODERATE 35: CPT | Performed by: STUDENT IN AN ORGANIZED HEALTH CARE EDUCATION/TRAINING PROGRAM

## 2023-10-23 PROCEDURE — 6370000000 HC RX 637 (ALT 250 FOR IP): Performed by: NURSE PRACTITIONER

## 2023-10-23 PROCEDURE — 85027 COMPLETE CBC AUTOMATED: CPT

## 2023-10-23 PROCEDURE — 3700000000 HC ANESTHESIA ATTENDED CARE: Performed by: SURGERY

## 2023-10-23 PROCEDURE — 2500000003 HC RX 250 WO HCPCS: Performed by: NURSE ANESTHETIST, CERTIFIED REGISTERED

## 2023-10-23 PROCEDURE — 2500000003 HC RX 250 WO HCPCS: Performed by: NURSE PRACTITIONER

## 2023-10-23 PROCEDURE — 3700000001 HC ADD 15 MINUTES (ANESTHESIA): Performed by: SURGERY

## 2023-10-23 PROCEDURE — 6370000000 HC RX 637 (ALT 250 FOR IP): Performed by: SURGERY

## 2023-10-23 PROCEDURE — 0DH60UZ INSERTION OF FEEDING DEVICE INTO STOMACH, OPEN APPROACH: ICD-10-PCS | Performed by: SURGERY

## 2023-10-23 PROCEDURE — 83540 ASSAY OF IRON: CPT

## 2023-10-23 PROCEDURE — 2580000003 HC RX 258: Performed by: NURSE PRACTITIONER

## 2023-10-23 PROCEDURE — 92526 ORAL FUNCTION THERAPY: CPT

## 2023-10-23 PROCEDURE — 82746 ASSAY OF FOLIC ACID SERUM: CPT

## 2023-10-23 PROCEDURE — 6360000002 HC RX W HCPCS

## 2023-10-23 PROCEDURE — 2580000003 HC RX 258: Performed by: STUDENT IN AN ORGANIZED HEALTH CARE EDUCATION/TRAINING PROGRAM

## 2023-10-23 PROCEDURE — 6370000000 HC RX 637 (ALT 250 FOR IP): Performed by: EMERGENCY MEDICINE

## 2023-10-23 PROCEDURE — 6360000002 HC RX W HCPCS: Performed by: NURSE PRACTITIONER

## 2023-10-23 PROCEDURE — 3E0G76Z INTRODUCTION OF NUTRITIONAL SUBSTANCE INTO UPPER GI, VIA NATURAL OR ARTIFICIAL OPENING: ICD-10-PCS | Performed by: SURGERY

## 2023-10-23 PROCEDURE — 2060000000 HC ICU INTERMEDIATE R&B

## 2023-10-23 PROCEDURE — 36415 COLL VENOUS BLD VENIPUNCTURE: CPT

## 2023-10-23 PROCEDURE — 7100000000 HC PACU RECOVERY - FIRST 15 MIN: Performed by: SURGERY

## 2023-10-23 PROCEDURE — 2580000003 HC RX 258: Performed by: EMERGENCY MEDICINE

## 2023-10-23 PROCEDURE — 43830 GSTRST OPEN WO CONSTJ TUBE: CPT | Performed by: SURGERY

## 2023-10-23 PROCEDURE — 2709999900 HC NON-CHARGEABLE SUPPLY: Performed by: SURGERY

## 2023-10-23 PROCEDURE — 80048 BASIC METABOLIC PNL TOTAL CA: CPT

## 2023-10-23 PROCEDURE — 6360000002 HC RX W HCPCS: Performed by: STUDENT IN AN ORGANIZED HEALTH CARE EDUCATION/TRAINING PROGRAM

## 2023-10-23 RX ORDER — FERROUS SULFATE 325(65) MG
325 TABLET ORAL EVERY OTHER DAY
Status: DISCONTINUED | OUTPATIENT
Start: 2023-10-23 | End: 2023-10-24 | Stop reason: HOSPADM

## 2023-10-23 RX ORDER — FENTANYL CITRATE 50 UG/ML
INJECTION, SOLUTION INTRAMUSCULAR; INTRAVENOUS
Status: COMPLETED
Start: 2023-10-23 | End: 2023-10-23

## 2023-10-23 RX ORDER — ONDANSETRON 2 MG/ML
INJECTION INTRAMUSCULAR; INTRAVENOUS PRN
Status: DISCONTINUED | OUTPATIENT
Start: 2023-10-23 | End: 2023-10-23 | Stop reason: SDUPTHER

## 2023-10-23 RX ORDER — ROCURONIUM BROMIDE 10 MG/ML
INJECTION, SOLUTION INTRAVENOUS PRN
Status: DISCONTINUED | OUTPATIENT
Start: 2023-10-23 | End: 2023-10-23 | Stop reason: SDUPTHER

## 2023-10-23 RX ORDER — MIDAZOLAM HYDROCHLORIDE 1 MG/ML
INJECTION INTRAMUSCULAR; INTRAVENOUS PRN
Status: DISCONTINUED | OUTPATIENT
Start: 2023-10-23 | End: 2023-10-23 | Stop reason: SDUPTHER

## 2023-10-23 RX ORDER — FENTANYL CITRATE 50 UG/ML
50 INJECTION, SOLUTION INTRAMUSCULAR; INTRAVENOUS EVERY 5 MIN PRN
Status: COMPLETED | OUTPATIENT
Start: 2023-10-23 | End: 2023-10-23

## 2023-10-23 RX ORDER — PROPOFOL 10 MG/ML
INJECTION, EMULSION INTRAVENOUS PRN
Status: DISCONTINUED | OUTPATIENT
Start: 2023-10-23 | End: 2023-10-23 | Stop reason: SDUPTHER

## 2023-10-23 RX ORDER — FENTANYL CITRATE 50 UG/ML
INJECTION, SOLUTION INTRAMUSCULAR; INTRAVENOUS PRN
Status: DISCONTINUED | OUTPATIENT
Start: 2023-10-23 | End: 2023-10-23 | Stop reason: SDUPTHER

## 2023-10-23 RX ORDER — SODIUM CHLORIDE 9 MG/ML
INJECTION, SOLUTION INTRAVENOUS CONTINUOUS PRN
Status: DISCONTINUED | OUTPATIENT
Start: 2023-10-23 | End: 2023-10-23 | Stop reason: SDUPTHER

## 2023-10-23 RX ADMIN — FENTANYL CITRATE 50 MCG: 50 INJECTION, SOLUTION INTRAMUSCULAR; INTRAVENOUS at 13:26

## 2023-10-23 RX ADMIN — LEVETIRACETAM 500 MG: 500 SOLUTION ORAL at 08:30

## 2023-10-23 RX ADMIN — HYDROMORPHONE HYDROCHLORIDE 1 MG: 1 INJECTION, SOLUTION INTRAMUSCULAR; INTRAVENOUS; SUBCUTANEOUS at 23:25

## 2023-10-23 RX ADMIN — PIPERACILLIN AND TAZOBACTAM 3375 MG: 3; .375 INJECTION, POWDER, LYOPHILIZED, FOR SOLUTION INTRAVENOUS at 04:29

## 2023-10-23 RX ADMIN — PROPOFOL 140 MG: 10 INJECTION, EMULSION INTRAVENOUS at 13:33

## 2023-10-23 RX ADMIN — MIDAZOLAM 2 MG: 1 INJECTION INTRAMUSCULAR; INTRAVENOUS at 13:26

## 2023-10-23 RX ADMIN — FENTANYL CITRATE 50 MCG: 50 INJECTION, SOLUTION INTRAMUSCULAR; INTRAVENOUS at 14:29

## 2023-10-23 RX ADMIN — DAKIN'S SOLUTION 0.125% (QUARTER STRENGTH): 0.12 SOLUTION at 17:45

## 2023-10-23 RX ADMIN — LEVOTHYROXINE SODIUM 50 MCG: 0.05 TABLET ORAL at 08:30

## 2023-10-23 RX ADMIN — CEFAZOLIN 2000 MG: 10 INJECTION, POWDER, FOR SOLUTION INTRAVENOUS at 12:00

## 2023-10-23 RX ADMIN — SODIUM CHLORIDE: 9 INJECTION, SOLUTION INTRAVENOUS at 13:23

## 2023-10-23 RX ADMIN — HYDROCHLOROTHIAZIDE 25 MG: 25 TABLET ORAL at 08:30

## 2023-10-23 RX ADMIN — FENTANYL CITRATE 50 MCG: 50 INJECTION, SOLUTION INTRAMUSCULAR; INTRAVENOUS at 14:34

## 2023-10-23 RX ADMIN — SODIUM CHLORIDE, PRESERVATIVE FREE 10 ML: 5 INJECTION INTRAVENOUS at 21:58

## 2023-10-23 RX ADMIN — SUGAMMADEX 200 MG: 100 INJECTION, SOLUTION INTRAVENOUS at 14:05

## 2023-10-23 RX ADMIN — FENTANYL CITRATE 50 MCG: 50 INJECTION, SOLUTION INTRAMUSCULAR; INTRAVENOUS at 14:05

## 2023-10-23 RX ADMIN — AMLODIPINE BESYLATE 10 MG: 10 TABLET ORAL at 08:30

## 2023-10-23 RX ADMIN — HYDROMORPHONE HYDROCHLORIDE 1 MG: 1 INJECTION, SOLUTION INTRAMUSCULAR; INTRAVENOUS; SUBCUTANEOUS at 04:01

## 2023-10-23 RX ADMIN — ACETAMINOPHEN 650 MG: 325 TABLET ORAL at 01:13

## 2023-10-23 RX ADMIN — ROCURONIUM BROMIDE 50 MG: 10 INJECTION INTRAVENOUS at 13:33

## 2023-10-23 RX ADMIN — METHOCARBAMOL 750 MG: 500 TABLET ORAL at 08:31

## 2023-10-23 RX ADMIN — ONDANSETRON 4 MG: 2 INJECTION INTRAMUSCULAR; INTRAVENOUS at 13:33

## 2023-10-23 RX ADMIN — SODIUM CHLORIDE, PRESERVATIVE FREE 10 ML: 5 INJECTION INTRAVENOUS at 08:31

## 2023-10-23 ASSESSMENT — PAIN SCALES - GENERAL
PAINLEVEL_OUTOF10: 8
PAINLEVEL_OUTOF10: 7
PAINLEVEL_OUTOF10: 8
PAINLEVEL_OUTOF10: 8
PAINLEVEL_OUTOF10: 7

## 2023-10-23 ASSESSMENT — ENCOUNTER SYMPTOMS
EYE REDNESS: 0
SHORTNESS OF BREATH: 0
ABDOMINAL PAIN: 0
CONSTIPATION: 0
COLOR CHANGE: 0
ABDOMINAL DISTENTION: 0
DIARRHEA: 0
CHEST TIGHTNESS: 0
TROUBLE SWALLOWING: 1
COUGH: 0
NAUSEA: 0
BACK PAIN: 1

## 2023-10-23 ASSESSMENT — PAIN DESCRIPTION - FREQUENCY: FREQUENCY: CONTINUOUS

## 2023-10-23 ASSESSMENT — PAIN DESCRIPTION - ONSET: ONSET: ON-GOING

## 2023-10-23 ASSESSMENT — PAIN - FUNCTIONAL ASSESSMENT
PAIN_FUNCTIONAL_ASSESSMENT: PREVENTS OR INTERFERES SOME ACTIVE ACTIVITIES AND ADLS
PAIN_FUNCTIONAL_ASSESSMENT: ACTIVITIES ARE NOT PREVENTED

## 2023-10-23 ASSESSMENT — PAIN DESCRIPTION - ORIENTATION
ORIENTATION: RIGHT
ORIENTATION: RIGHT

## 2023-10-23 ASSESSMENT — PAIN DESCRIPTION - DESCRIPTORS
DESCRIPTORS: DISCOMFORT;ACHING
DESCRIPTORS: SORE
DESCRIPTORS: ACHING;DISCOMFORT

## 2023-10-23 ASSESSMENT — PAIN DESCRIPTION - PAIN TYPE: TYPE: ACUTE PAIN

## 2023-10-23 ASSESSMENT — PAIN DESCRIPTION - LOCATION
LOCATION: ABDOMEN
LOCATION: ARM
LOCATION: ARM

## 2023-10-23 NOTE — OP NOTE
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       Procedure Performed:    Sharp excisional drainage of stage IV gluteal and perineal decubitus wound 15 cm x 22 cm x 2.5 cm including skin, subcutanoues tissue and muscle   Incision and drainage of sacral abscess  Rectal exam under anesthesia     Surgeon(s):  Corin Mejias MD    Assistant:   First Assistant: Iraida Berkowitz RN    Anesthesia: General    Estimated Blood Loss (mL): 50 ml    Complications: None    Specimens:   ID Type Source Tests Collected by Time Destination   1 : tissue left perirectal abscess Tissue Buttock CULTURE, ANAEROBIC AND AEROBIC Corin Mejias MD 10/13/2023 7953        Implants:  * No implants in log *      Drains:   NG/OG/NJ/NE Tube Orogastric (Active)   Surrounding Skin Clean, dry & intact 10/13/23 0400   Securement device Tape 10/13/23 0400   Status Clamped 10/13/23 0400   Placement Verified External Catheter Length 10/13/23 0400   NG/OG/NJ/NE External Measurement (cm) 65 cm 10/13/23 0400   Drainage Appearance Tan 10/13/23 0400   Tube Feeding Other Tube Feeding (specify) 10/13/23 0400   Tube feeding/verify rate (mL/hr) 50 mL/hr 10/12/23 2045   Tube Feeding Intake (mL) 148 ml 10/13/23 0000   Free Water/Flush (mL) 110 mL 10/13/23 0000   Output (mL) 500 ml 10/12/23 1801   Action Taken Placement verified (comment) 10/12/23 1200   Residual Volume (ml) 280 ml 10/12/23 2045       Urinary Catheter 10/09/23 (Active)   Catheter Indications Need for fluid volume management of the critically ill patient in a critical care setting 10/13/23 0400   Site Assessment Pink 10/13/23 0400   Urine Color Wendy 10/13/23 0400   Urine Appearance Clear 10/13/23 0400   Collection Container Standard 10/13/23 0400   Securement Method
Operative Note      Patient: Katie Rios  YOB: 1980  MRN: [id number]    Date of Procedure: 10/16/2023    Pre-Op Diagnosis Codes:     * Abscess [L02.91]    Post-Op Diagnosis:   Left buttock and thigh abscess and necrosis of skin, fat , soft tissue and muscle       Procedure(s):  Robotic Diverting Colostomy;Debridement of Gluteal wound and Left thigh/leg  Sharp excisional debridement of skin subcutaneous tissue fat and muscle, measuring 38 x 28 x 4 cm    Surgeon(s):  Andrew Jansen MD    Assistant:   Surgical Assistant: Jolynn Horn    Anesthesia: General    Estimated Blood Loss (mL): 957    Complications: None    Specimens:   ID Type Source Tests Collected by Time Destination   1 : Left Buttock wound  Body Fluid Buttock CULTURE, ANAEROBIC AND AEROBIC Andrew Jansen MD 10/16/2023 1513        Implants:  Implant Name Type Inv. Item Serial No.  Lot No. LRB No. Used Action   CLIP INT L POLYMER ARANZA LIG HEM O ARANZA (6EA/PK) - [id number]  CLIP INT L POLYMER ARANZA LIG HEM O ARANZA (6EA/PK)  [employer] [id number] N/A 1 Implanted         Drains:   NG/OG/NJ/NE Tube Orogastric (Active)   Surrounding Skin Clean, dry & intact 10/16/23 0800   Securement device Tape 10/16/23 0800   Status Clamped 10/16/23 0800   Placement Verified External Catheter Length 10/16/23 0800   NG/OG/NJ/NE External Measurement (cm) 65 cm 10/16/23 0800   Drainage Appearance Tan 10/16/23 0800   Tube Feeding Other Tube Feeding (specify) 10/16/23 0800   Tube feeding/verify rate (mL/hr) 0 mL/hr 10/15/23 2000   Tube Feeding Intake (mL) 458 ml 10/15/23 1530   Free Water/Flush (mL) 50 mL 10/15/23 2000   Output (mL) 60 ml 10/15/23 1530   Action Taken Feed set changed; Tubing changed 10/14/23 1745   Residual Volume (ml) 0 ml 10/15/23 2000       Colostomy LUQ (Active)       Urinary Catheter 10/09/23 (Active)   Catheter Indications Need for fluid volume management of the critically ill patient in a critical care setting;Perioperative use
Status Draining 10/23/23 0800   Output (mL) 500 mL 10/23/23 0800       [REMOVED] NG/OG/NJ/NE Tube Orogastric (Removed)   Surrounding Skin Clean, dry & intact 10/17/23 0800   Securement device Tape 10/17/23 0800   Status Clamped 10/17/23 0800   Placement Verified External Catheter Length 10/17/23 0800   NG/OG/NJ/NE External Measurement (cm) 65 cm 10/17/23 0800   Drainage Appearance Tan 10/17/23 0800   Tube Feeding Other Tube Feeding (specify) 10/16/23 0800   Tube feeding/verify rate (mL/hr) 0 mL/hr 10/15/23 2000   Tube Feeding Intake (mL) 458 ml 10/15/23 1530   Free Water/Flush (mL) 50 mL 10/15/23 2000   Output (mL) 60 ml 10/15/23 1530   Action Taken Feed set changed; Tubing changed 10/14/23 1745   Residual Volume (ml) 0 ml 10/15/23 2000       [REMOVED] NG/OG/NJ/NE Tube Nasogastric 18 fr Left nostril (Removed)   Surrounding Skin Clean, dry & intact 10/19/23 0800   Securement device Adhesive based hart 10/19/23 0800   Status Continuous feeding 10/19/23 0800   Placement Verified External Catheter Length;X-Ray (Initial) 10/19/23 0800   NG/OG/NJ/NE External Measurement (cm) 60 cm 10/19/23 0800   Drainage Appearance Clear 10/18/23 1500   Tube Feeding Other Tube Feeding (specify) 10/18/23 2000   Tube feeding/verify rate (mL/hr) 55 mL/hr 10/19/23 0800   Tube Feeding Intake (mL) 473 ml 10/19/23 0621   Free Water/Flush (mL) 30 mL 10/19/23 0800       Findings:   24 [place] tube in body of stomach with external bumper at 4cm         Detailed Description of Procedure:   He was seen in the preoperative holding room where informed consent was reviewed. Is taken the operating placed upper table in a supine position. After adequate anesthesia formed was performed is prepped and draped normal sterile fashion. Over the stoma a gauze soaked in Betadine was placed. Incision was made in his midline from his xiphoid down. This was deepened with cautery abdominal cavity was entered.   The stomach was identified and grasped and

## 2023-10-23 NOTE — FLOWSHEET NOTE
10/23/23 1009   Safe Environment   Safety Measures Nurse at [place] [employer] Measures  (virtual nurse safety round completed)     Pt did not respond to voice, camera turned on for safety. Pt resting in bed, Chest visibly rising and falling. No apparent signs of distress.  RN at bedside

## 2023-10-23 NOTE — FLOWSHEET NOTE
10/23/23 0343   Safe Environment   Safety Measures Side rails X 2;Standard Safety Measures  (virtual nurse safety round complete)     Pt did not respond to voice, camera turned on for safety. Pt resting in bed. with eyes closed. Chest visibly rising and falling. No apparent signs of distress.  Staff in room

## 2023-10-24 ENCOUNTER — HOSPITAL ENCOUNTER (OUTPATIENT)
Age: 43
End: 2023-10-24
Attending: INTERNAL MEDICINE | Admitting: INTERNAL MEDICINE
Payer: COMMERCIAL

## 2023-10-24 VITALS
HEART RATE: 114 BPM | OXYGEN SATURATION: 98 % | DIASTOLIC BLOOD PRESSURE: 70 MMHG | RESPIRATION RATE: 16 BRPM | BODY MASS INDEX: 25.93 KG/M2 | WEIGHT: 155.65 LBS | TEMPERATURE: 99 F | HEIGHT: 65 IN | SYSTOLIC BLOOD PRESSURE: 125 MMHG

## 2023-10-24 LAB
ANION GAP SERPL CALC-SCNC: 17 MEQ/L (ref 8–16)
BUN SERPL-MCNC: 11 MG/DL (ref 7–22)
CALCIUM SERPL-MCNC: 8.6 MG/DL (ref 8.5–10.5)
CHLORIDE SERPL-SCNC: 100 MEQ/L (ref 98–111)
CO2 SERPL-SCNC: 23 MEQ/L (ref 23–33)
CREAT SERPL-MCNC: 0.6 MG/DL (ref 0.4–1.2)
DEPRECATED RDW RBC AUTO: 50.1 FL (ref 35–45)
ERYTHROCYTE [DISTWIDTH] IN BLOOD BY AUTOMATED COUNT: 15.6 % (ref 11.5–14.5)
GFR SERPL CREATININE-BSD FRML MDRD: > 60 ML/MIN/1.73M2
GLUCOSE SERPL-MCNC: 72 MG/DL (ref 70–108)
HCT VFR BLD AUTO: 30 % (ref 42–52)
HGB BLD-MCNC: 9.6 GM/DL (ref 14–18)
MCH RBC QN AUTO: 28.5 PG (ref 26–33)
MCHC RBC AUTO-ENTMCNC: 32 GM/DL (ref 32.2–35.5)
MCV RBC AUTO: 89 FL (ref 80–94)
PLATELET # BLD AUTO: 386 THOU/MM3 (ref 130–400)
PMV BLD AUTO: 9.9 FL (ref 9.4–12.4)
POTASSIUM SERPL-SCNC: 4 MEQ/L (ref 3.5–5.2)
RBC # BLD AUTO: 3.37 MILL/MM3 (ref 4.7–6.1)
SODIUM SERPL-SCNC: 140 MEQ/L (ref 135–145)
WBC # BLD AUTO: 9.7 THOU/MM3 (ref 4.8–10.8)

## 2023-10-24 PROCEDURE — 85027 COMPLETE CBC AUTOMATED: CPT

## 2023-10-24 PROCEDURE — 6370000000 HC RX 637 (ALT 250 FOR IP): Performed by: EMERGENCY MEDICINE

## 2023-10-24 PROCEDURE — 80048 BASIC METABOLIC PNL TOTAL CA: CPT

## 2023-10-24 PROCEDURE — 2580000003 HC RX 258: Performed by: EMERGENCY MEDICINE

## 2023-10-24 PROCEDURE — 99024 POSTOP FOLLOW-UP VISIT: CPT | Performed by: NURSE PRACTITIONER

## 2023-10-24 PROCEDURE — APPSS30 APP SPLIT SHARED TIME 16-30 MINUTES: Performed by: NURSE PRACTITIONER

## 2023-10-24 PROCEDURE — 6370000000 HC RX 637 (ALT 250 FOR IP): Performed by: SURGERY

## 2023-10-24 PROCEDURE — 43762 RPLC GTUBE NO REVJ TRC: CPT

## 2023-10-24 PROCEDURE — 2500000003 HC RX 250 WO HCPCS: Performed by: NURSE PRACTITIONER

## 2023-10-24 PROCEDURE — 92526 ORAL FUNCTION THERAPY: CPT

## 2023-10-24 PROCEDURE — 36415 COLL VENOUS BLD VENIPUNCTURE: CPT

## 2023-10-24 PROCEDURE — 97129 THER IVNTJ 1ST 15 MIN: CPT

## 2023-10-24 RX ORDER — AMLODIPINE BESYLATE 10 MG/1
10 TABLET ORAL DAILY
Qty: 30 TABLET | Refills: 0 | Status: ON HOLD | DISCHARGE
Start: 2023-10-25

## 2023-10-24 RX ORDER — HYDROCHLOROTHIAZIDE 25 MG/1
25 TABLET ORAL DAILY
Qty: 30 TABLET | Refills: 3 | Status: ON HOLD | DISCHARGE
Start: 2023-10-25

## 2023-10-24 RX ORDER — SODIUM HYPOCHLORITE 1.25 MG/ML
SOLUTION TOPICAL DAILY
Refills: 0 | Status: ON HOLD | DISCHARGE
Start: 2023-10-25

## 2023-10-24 RX ORDER — THIAMINE MONONITRATE (VIT B1) 100 MG
100 TABLET ORAL DAILY
Qty: 30 TABLET | Refills: 0 | Status: ON HOLD | DISCHARGE
Start: 2023-10-24

## 2023-10-24 RX ORDER — DOXAZOSIN MESYLATE 1 MG/1
1 TABLET ORAL NIGHTLY
Qty: 30 TABLET | Refills: 3 | Status: ON HOLD | DISCHARGE
Start: 2023-10-24

## 2023-10-24 RX ORDER — LEVOTHYROXINE SODIUM 0.05 MG/1
50 TABLET ORAL DAILY
Qty: 30 TABLET | Refills: 0 | Status: ON HOLD | DISCHARGE
Start: 2023-10-25

## 2023-10-24 RX ORDER — FERROUS SULFATE 325(65) MG
325 TABLET ORAL EVERY OTHER DAY
Qty: 30 TABLET | Refills: 0 | Status: ON HOLD | DISCHARGE
Start: 2023-10-25

## 2023-10-24 RX ORDER — LEVETIRACETAM 100 MG/ML
500 SOLUTION ORAL 2 TIMES DAILY
Refills: 3 | Status: ON HOLD | DISCHARGE
Start: 2023-10-24

## 2023-10-24 RX ORDER — MULTIVITAMIN WITH IRON
1 TABLET ORAL DAILY
Refills: 0 | Status: ON HOLD | DISCHARGE
Start: 2023-10-25

## 2023-10-24 RX ORDER — FOLIC ACID 1 MG/1
1 TABLET ORAL DAILY
Qty: 30 TABLET | Refills: 3 | Status: ON HOLD | DISCHARGE
Start: 2023-10-25

## 2023-10-24 RX ADMIN — HYDROMORPHONE HYDROCHLORIDE 1 MG: 1 INJECTION, SOLUTION INTRAMUSCULAR; INTRAVENOUS; SUBCUTANEOUS at 08:52

## 2023-10-24 RX ADMIN — DAKIN'S SOLUTION 0.125% (QUARTER STRENGTH): 0.12 SOLUTION at 11:06

## 2023-10-24 RX ADMIN — ACETAMINOPHEN 650 MG: 325 TABLET ORAL at 13:27

## 2023-10-24 RX ADMIN — HYDROMORPHONE HYDROCHLORIDE 1 MG: 1 INJECTION, SOLUTION INTRAMUSCULAR; INTRAVENOUS; SUBCUTANEOUS at 03:17

## 2023-10-24 RX ADMIN — SODIUM CHLORIDE, PRESERVATIVE FREE 10 ML: 5 INJECTION INTRAVENOUS at 08:54

## 2023-10-24 RX ADMIN — METHOCARBAMOL 750 MG: 500 TABLET ORAL at 13:28

## 2023-10-24 ASSESSMENT — PAIN DESCRIPTION - DESCRIPTORS
DESCRIPTORS: ACHING;PRESSURE
DESCRIPTORS: ACHING;DISCOMFORT;PRESSURE
DESCRIPTORS: DISCOMFORT

## 2023-10-24 ASSESSMENT — PAIN SCALES - GENERAL
PAINLEVEL_OUTOF10: 6
PAINLEVEL_OUTOF10: 1
PAINLEVEL_OUTOF10: 3
PAINLEVEL_OUTOF10: 8
PAINLEVEL_OUTOF10: 6

## 2023-10-24 ASSESSMENT — PAIN DESCRIPTION - LOCATION
LOCATION: ABDOMEN;BACK
LOCATION: ABDOMEN;BACK
LOCATION: NECK
LOCATION: ABDOMEN;BACK
LOCATION: ABDOMEN;BACK

## 2023-10-24 ASSESSMENT — PAIN - FUNCTIONAL ASSESSMENT
PAIN_FUNCTIONAL_ASSESSMENT: ACTIVITIES ARE NOT PREVENTED

## 2023-10-24 ASSESSMENT — PAIN DESCRIPTION - PAIN TYPE
TYPE: SURGICAL PAIN;CHRONIC PAIN
TYPE: ACUTE PAIN
TYPE: SURGICAL PAIN;CHRONIC PAIN

## 2023-10-24 ASSESSMENT — PAIN DESCRIPTION - ORIENTATION
ORIENTATION: POSTERIOR;ANTERIOR
ORIENTATION: RIGHT
ORIENTATION: ANTERIOR;POSTERIOR
ORIENTATION: ANTERIOR;POSTERIOR

## 2023-10-24 ASSESSMENT — PAIN DESCRIPTION - FREQUENCY
FREQUENCY: CONTINUOUS
FREQUENCY: CONTINUOUS

## 2023-10-24 ASSESSMENT — PAIN DESCRIPTION - ONSET
ONSET: ON-GOING

## 2023-10-24 ASSESSMENT — PAIN SCALES - WONG BAKER
WONGBAKER_NUMERICALRESPONSE: 8
WONGBAKER_NUMERICALRESPONSE: 2

## 2023-10-24 NOTE — FLOWSHEET NOTE
10/24/23 5774   Safe Environment   Safety Measures Other (comment)  (virtual safety round)     VN called into patients room and introduced myself and role. Patient answered and permitted video. Video activated. Patient resting comfortably in bed. Patient voiced no needs at this time.

## 2023-10-24 NOTE — PLAN OF CARE
Problem: Discharge Planning  Goal: Discharge to home or other facility with appropriate resources  10/14/2023 0047 by Madai Briggs RN  Flowsheets (Taken 10/14/2023 2172)  Discharge to home or other facility with appropriate resources:   Identify barriers to discharge with patient and caregiver   Arrange for needed discharge resources and transportation as appropriate   Identify discharge learning needs (meds, wound care, etc)   Refer to discharge planning if patient needs post-hospital services based on physician order or complex needs related to functional status, cognitive ability or social support system     Problem: Safety - Medical Restraint  Goal: Remains free of injury from restraints (Restraint for Interference with Medical Device)  Description: INTERVENTIONS:  1. Determine that other, less restrictive measures have been tried or would not be effective before applying the restraint  2. Evaluate the patient's condition at the time of restraint application  3. Inform patient/family regarding the reason for restraint  4.  Q2H: Monitor safety, psychosocial status, comfort, nutrition and hydration  10/14/2023 0047 by Madai Briggs RN  Flowsheets (Taken 10/14/2023 0000)  Remains free of injury from restraints (restraint for interference with medical device):   Determine that other, less restrictive measures have been tried or would not be effective before applying the restraint   Evaluate the patient's condition at the time of restraint application   Inform patient/family regarding the reason for restraint   Every 2 hours: Monitor safety, psychosocial status, comfort, nutrition and hydration     Problem: Respiratory - Adult  Goal: Achieves optimal ventilation and oxygenation  10/14/2023 0047 by Madai Briggs RN  Flowsheets (Taken 10/14/2023 0047)  Achieves optimal ventilation and oxygenation:   Assess for changes in respiratory status   Position to facilitate oxygenation and minimize respiratory
Problem: Discharge Planning  Goal: Discharge to home or other facility with appropriate resources  10/19/2023 1235 by Juanita Awad RN  Outcome: Progressing  Flowsheets (Taken 10/19/2023 1235)  Discharge to home or other facility with appropriate resources:   Arrange for needed discharge resources and transportation as appropriate   Identify barriers to discharge with patient and caregiver     Problem: Safety - Medical Restraint  Goal: Remains free of injury from restraints (Restraint for Interference with Medical Device)  Description: INTERVENTIONS:  1. Determine that other, less restrictive measures have been tried or would not be effective before applying the restraint  2. Evaluate the patient's condition at the time of restraint application  3. Inform patient/family regarding the reason for restraint  4.  Q2H: Monitor safety, psychosocial status, comfort, nutrition and hydration  10/19/2023 1235 by Juanita Awad RN  Outcome: Progressing     Problem: Respiratory - Adult  Goal: Achieves optimal ventilation and oxygenation  10/19/2023 1235 by Juanita Awad RN  Outcome: Progressing  Flowsheets (Taken 10/19/2023 1235)  Achieves optimal ventilation and oxygenation:   Assess for changes in respiratory status   Assess for changes in mentation and behavior     Problem: Pain  Goal: Verbalizes/displays adequate comfort level or baseline comfort level  10/19/2023 1235 by Juanita Awad RN  Outcome: Progressing  Flowsheets (Taken 10/19/2023 1235)  Verbalizes/displays adequate comfort level or baseline comfort level:   Encourage patient to monitor pain and request assistance   Assess pain using appropriate pain scale     Problem: Nutrition Deficit:  Goal: Optimize nutritional status  10/19/2023 1235 by Juanita Awad RN  Outcome: Progressing  Flowsheets (Taken 10/19/2023 1235)  Nutrient intake appropriate for improving, restoring, or maintaining nutritional needs: Assess nutritional status and recommend course of
Problem: Discharge Planning  Goal: Discharge to home or other facility with appropriate resources  10/24/2023 1210 by Nadeem Hughes RN  Outcome: Progressing  Flowsheets (Taken 10/24/2023 0013 by Melina Montano RN)  Discharge to home or other facility with appropriate resources: Identify barriers to discharge with patient and caregiver  10/24/2023 0013 by Melina Montano RN  Outcome: Progressing  Flowsheets (Taken 10/24/2023 0013)  Discharge to home or other facility with appropriate resources: Identify barriers to discharge with patient and caregiver     Problem: Respiratory - Adult  Goal: Achieves optimal ventilation and oxygenation  10/24/2023 1210 by Nadeem Hughes RN  Outcome: Progressing  10/24/2023 0013 by Melina Montano RN  Outcome: Progressing  Flowsheets (Taken 10/24/2023 0013)  Achieves optimal ventilation and oxygenation:   Assess for changes in respiratory status   Assess for changes in mentation and behavior   Assess the need for suctioning and aspirate as needed     Problem: Pain  Goal: Verbalizes/displays adequate comfort level or baseline comfort level  10/24/2023 1210 by Nadeem Hughes RN  Outcome: Progressing  10/24/2023 0013 by Melina Montano RN  Outcome: Progressing  Flowsheets (Taken 10/24/2023 0013)  Verbalizes/displays adequate comfort level or baseline comfort level:   Assess pain using appropriate pain scale   Encourage patient to monitor pain and request assistance   Administer analgesics based on type and severity of pain and evaluate response   Implement non-pharmacological measures as appropriate and evaluate response   Consider cultural and social influences on pain and pain management     Problem: Nutrition Deficit:  Goal: Optimize nutritional status  10/24/2023 1210 by Nadeem Hughes RN  Outcome: Progressing  10/24/2023 0013 by Melina Montano RN  Outcome: Progressing  Flowsheets (Taken 10/24/2023 0013)  Nutrient intake appropriate for improving, restoring, or maintaining
Problem: Discharge Planning  Goal: Discharge to home or other facility with appropriate resources  Outcome: Progressing     Problem: Safety - Medical Restraint  Goal: Remains free of injury from restraints (Restraint for Interference with Medical Device)  Description: INTERVENTIONS:  1. Determine that other, less restrictive measures have been tried or would not be effective before applying the restraint  2. Evaluate the patient's condition at the time of restraint application  3. Inform patient/family regarding the reason for restraint  4.  Q2H: Monitor safety, psychosocial status, comfort, nutrition and hydration  10/16/2023 0420 by Ivanna Cedillo RN  Outcome: Progressing  Flowsheets (Taken 10/15/2023 2000)  Remains free of injury from restraints (restraint for interference with medical device):   Determine that other, less restrictive measures have been tried or would not be effective before applying the restraint   Evaluate the patient's condition at the time of restraint application   Inform patient/family regarding the reason for restraint   Every 2 hours: Monitor safety, psychosocial status, comfort, nutrition and hydration  10/15/2023 1956 by Fam Medel RN  Outcome: Progressing  Flowsheets (Taken 10/14/2023 2000 by Ivanna Cedillo RN)  Remains free of injury from restraints (restraint for interference with medical device):   Determine that other, less restrictive measures have been tried or would not be effective before applying the restraint   Evaluate the patient's condition at the time of restraint application   Inform patient/family regarding the reason for restraint   Every 2 hours: Monitor safety, psychosocial status, comfort, nutrition and hydration     Problem: Respiratory - Adult  Goal: Achieves optimal ventilation and oxygenation  10/16/2023 0420 by Ivanna Cedillo RN  Outcome: Progressing  10/16/2023 0348 by Yair Davidson RCP  Outcome: Progressing  Goal: Will be able to breathe
Problem: Discharge Planning  Goal: Discharge to home or other facility with appropriate resources  Outcome: Progressing  Discharge to home or other facility with appropriate resources:   Identify barriers to discharge with patient and caregiver   Arrange for needed discharge resources and transportation as appropriate   Identify discharge learning needs (meds, wound care, etc)     Problem: Respiratory - Adult  Goal: Achieves optimal ventilation and oxygenation  Outcome: Progressing  Achieves optimal ventilation and oxygenation:   Assess for changes in respiratory status   Assess for changes in mentation and behavior   Position to facilitate oxygenation and minimize respiratory effort     Problem: Pain  Goal: Verbalizes/displays adequate comfort level or baseline comfort level  Outcome: Progressing  Verbalizes/displays adequate comfort level or baseline comfort level:   Encourage patient to monitor pain and request assistance   Assess pain using appropriate pain scale   Administer analgesics based on type and severity of pain and evaluate response     Problem: Nutrition Deficit:  Goal: Optimize nutritional status  Outcome: Progressing  Nutrient intake appropriate for improving, restoring, or maintaining nutritional needs:   Assess nutritional status and recommend course of action   Monitor oral intake, labs, and treatment plans   Recommend appropriate diets, oral nutritional supplements, and vitamin/mineral supplements     Problem: Safety - Adult  Goal: Free from fall injury  Outcome: Progressing  Free From Fall Injury: Instruct family/caregiver on patient safety     Problem: Skin/Tissue Integrity  Goal: Absence of new skin breakdown  Description: 1. Monitor for areas of redness and/or skin breakdown  2. Assess vascular access sites hourly  3. Every 4-6 hours minimum:  Change oxygen saturation probe site  4.   Every 4-6 hours:  If on nasal continuous positive airway pressure, respiratory therapy assess nares and
Problem: Discharge Planning  Goal: Discharge to home or other facility with appropriate resources  Outcome: Progressing  Flowsheets (Taken 10/10/2023 2000)  Discharge to home or other facility with appropriate resources:   Identify barriers to discharge with patient and caregiver   Arrange for needed discharge resources and transportation as appropriate   Identify discharge learning needs (meds, wound care, etc)     Problem: Safety - Medical Restraint  Goal: Remains free of injury from restraints (Restraint for Interference with Medical Device)  Description: INTERVENTIONS:  1. Determine that other, less restrictive measures have been tried or would not be effective before applying the restraint  2. Evaluate the patient's condition at the time of restraint application  3. Inform patient/family regarding the reason for restraint  4.  Q2H: Monitor safety, psychosocial status, comfort, nutrition and hydration  Outcome: Progressing     Problem: Respiratory - Adult  Goal: Achieves optimal ventilation and oxygenation  Outcome: Progressing  Flowsheets (Taken 10/10/2023 2000)  Achieves optimal ventilation and oxygenation:   Assess for changes in mentation and behavior   Assess for changes in respiratory status   Oxygen supplementation based on oxygen saturation or arterial blood gases   Initiate smoking cessation protocol as indicated  Goal: Will be able to breathe spontaneously, without ventilator support  Description: Will be able to breathe spontaneously, without ventilator support  Outcome: Progressing     Problem: Pain  Goal: Verbalizes/displays adequate comfort level or baseline comfort level  Outcome: Progressing     Problem: Nutrition Deficit:  Goal: Optimize nutritional status  10/10/2023 2255 by Jose E Hoffmann RN  Outcome: Progressing  10/10/2023 1231 by Jai Crowell RD, LD  Flowsheets (Taken 10/10/2023 1231)  Nutrient intake appropriate for improving, restoring, or maintaining nutritional needs: Assess
Problem: Discharge Planning  Goal: Discharge to home or other facility with appropriate resources  Outcome: Progressing  Flowsheets (Taken 10/13/2023 0311)  Discharge to home or other facility with appropriate resources:   Identify barriers to discharge with patient and caregiver   Arrange for needed discharge resources and transportation as appropriate   Identify discharge learning needs (meds, wound care, etc)     Problem: Safety - Medical Restraint  Goal: Remains free of injury from restraints (Restraint for Interference with Medical Device)  Description: INTERVENTIONS:  1. Determine that other, less restrictive measures have been tried or would not be effective before applying the restraint  2. Evaluate the patient's condition at the time of restraint application  3. Inform patient/family regarding the reason for restraint  4.  Q2H: Monitor safety, psychosocial status, comfort, nutrition and hydration  Outcome: Progressing  Flowsheets  Taken 10/13/2023 0311  Remains free of injury from restraints (restraint for interference with medical device):   Determine that other, less restrictive measures have been tried or would not be effective before applying the restraint   Evaluate the patient's condition at the time of restraint application   Inform patient/family regarding the reason for restraint  Taken 10/13/2023 0000  Remains free of injury from restraints (restraint for interference with medical device):   Determine that other, less restrictive measures have been tried or would not be effective before applying the restraint   Evaluate the patient's condition at the time of restraint application   Inform patient/family regarding the reason for restraint   Every 2 hours: Monitor safety, psychosocial status, comfort, nutrition and hydration     Problem: Pain  Goal: Verbalizes/displays adequate comfort level or baseline comfort level  Outcome: Progressing  Flowsheets (Taken 10/13/2023 0311)  Verbalizes/displays
Problem: Respiratory - Adult  Goal: Achieves optimal ventilation and oxygenation  10/11/2023 1405 by Diane Franklin, RCNICOLA  Outcome: Progressing     Problem: Respiratory - Adult  Goal: Will be able to breathe spontaneously, without ventilator support  Description: Will be able to breathe spontaneously, without ventilator support  10/11/2023 1405 by Diane Franklin, RCNICOLA    Outcome: Not Progressing                                                  Patient Weaning Progress    The patient's vent settings was able to be weaned this shift. Ventilator settings that were weaned              [] Mode   [x] Pressure support weaned   [] Fio2 weaned   [] Peep weaned      Spontaneous weaning trial  was not attempted. due to defined parameters for SBT (spontaneous breathing trial) not being met. Reason that defined ventilator parameters for SBT was not met              [] Patient condition requires increased ventilator settings  [] Requires increased sedation   [x] Settings not within weaning range   [] SAT not completed   [] Physician orders      Unable to get agreement for goals because no family is present and patient cannot respond.
Problem: Respiratory - Adult  Goal: Achieves optimal ventilation and oxygenation  10/11/2023 2107 by Akhil Orr RCP  Outcome: Progressing  Flowsheets (Taken 10/11/2023 2107)  Achieves optimal ventilation and oxygenation:   Assess for changes in respiratory status   Position to facilitate oxygenation and minimize respiratory effort   Assess the need for suctioning and aspirate as needed   Respiratory therapy support as indicated   Assess for changes in mentation and behavior   Oxygen supplementation based on oxygen saturation or arterial blood gases   Assess and instruct to report shortness of breath or any respiratory difficulty   Encourage broncho-pulmonary hygiene including cough, deep breathe, incentive spirometry  Note: Intubated/Vented. Wean as tolerated. SBT when appropriate.
Problem: Respiratory - Adult  Goal: Achieves optimal ventilation and oxygenation  10/12/2023 1723 by Demetra Lombardi RCP  Note: Vent settings optimized to achieve target tidal volume, respiratory rate and ideal oxygen saturations. Will continue to wean as patient tolerates. SBT will be performed when appropriate. Patient Weaning Progress    The patient's vent settings were not able to be weaned this shift. Spontaneous weaning trial  was not attempted due to defined parameters for SBT (spontaneous breathing trial) not being met. Reason that defined ventilator parameters for SBT was not met              [] Patient condition requires increased ventilator settings  [] Requires increased sedation   [x] Settings not within weaning range   [] SAT not completed   [] Physician orders    Evac tube was  hooked up with continuous low suction(20-30mmHg)      Cuff was not  deflated to determine cuff leak. Unable to get agreement for goals because no family is present and patient cannot respond.
Problem: Respiratory - Adult  Goal: Achieves optimal ventilation and oxygenation  10/13/2023 0310 by Benson Lewis RCP  Outcome: Progressing                                                Patient Weaning Progress    The patient's vent settings was not able to be weaned this shift. Ventilator settings that were weaned              [] Mode   [] Pressure support weaned   [] Fio2 weaned   [] Peep weaned      Spontaneous weaning trial  was not attempted. due to defined parameters for SBT (spontaneous breathing trial) not being met. Reason that defined ventilator parameters for SBT was not met              [] Patient condition requires increased ventilator settings  [] Requires increased sedation   [] Settings not within weaning range   [] SAT not completed   [] Physician orders      Evac tube was hooked up with continuous low suction(20-30mmHg)      Cuff was not  deflated to determine cuff leak. Unable to get agreement for goals because no family is present and patient cannot respond.
Problem: Respiratory - Adult  Goal: Achieves optimal ventilation and oxygenation  10/14/2023 0515 by Basilia Herbert RCP  Outcome: Progressing     Problem: Respiratory - Adult  Goal: Will be able to breathe spontaneously, without ventilator support  Description: Will be able to breathe spontaneously, without ventilator support  10/14/2023 0515 by Basilia Herbert RCP  Outcome: Progressing                                                Patient Weaning Progress    The patient's vent settings was able to be weaned this shift. Ventilator settings that were weaned              [] Mode   [x] Pressure support weaned   [] Fio2 weaned   [] Peep weaned      Spontaneous weaning trial  was not attempted. due to defined parameters for SBT (spontaneous breathing trial) not being met. Reason that defined ventilator parameters for SBT was not met              [] Patient condition requires increased ventilator settings  [] Requires increased sedation   [x] Settings not within weaning range   [] SAT not completed   [] Physician orders  Patient is also agitated and tachypneic. RN and I spoke, and agreed a SBT was not appropriate at this time. Evac tube was  hooked up with continuous low suction(20-30mmHg)      Cuff was not  deflated to determine cuff leak. Unable to get agreement for goals because no family is present and patient cannot respond.
Problem: Respiratory - Adult  Goal: Achieves optimal ventilation and oxygenation  10/16/2023 1800 by Ashley FALL RCP  Outcome: Progressing  Flowsheets (Taken 10/16/2023 1800)  Achieves optimal ventilation and oxygenation:   Assess for changes in respiratory status   Assess the need for suctioning and aspirate as needed   Assess and instruct to report shortness of breath or any respiratory difficulty
Problem: Respiratory - Adult  Goal: Achieves optimal ventilation and oxygenation  10/17/2023 0532 by Terri Lewis RCP  Outcome: Progressing                                                Patient Weaning Progress    The patient's vent settings was able to be weaned this shift. Ventilator settings that were weaned              [] Mode   [x] Pressure support weaned   [x] Fio2 weaned   [] Peep weaned      Spontaneous weaning trial  was not attempted. due to defined parameters for SBT (spontaneous breathing trial) not being met. Reason that defined ventilator parameters for SBT was not met              [] Patient condition requires increased ventilator settings  [] Requires increased sedation   [x] Settings not within weaning range   [] SAT not completed   [] Physician orders    Cuff was not  deflated to determine cuff leak. Unable to get agreement for goals because no family is present and patient cannot respond.
Problem: Respiratory - Adult  Goal: Achieves optimal ventilation and oxygenation  10/17/2023 0903 by Jorge Luis Sanchez RCP  Outcome: Progressing                                                 Patient Weaning Progress    The patient's vent settings was able to be weaned this shift. Ventilator settings that were weaned              [x] Mode   [] Pressure support weaned   [x] Fio2 weaned   [] Peep weaned      Spontaneous weaning trial  was attempted. The patient was able to tolerate SBT. RSBI  was 32 with EtCO2 of 26 and SpO2 of 100 on 25% FiO2. Spontanteous VT was 425 and RR 1930 breaths/min. The patient was on SBT for 30 minutes     Evac tube was  hooked up with continuous low suction(20-30mmHg)      Cuff was  deflated to determine cuff leak. A leak  was detected. Patient mutually agreed on goals.
Problem: Respiratory - Adult  Goal: Achieves optimal ventilation and oxygenation  10/21/2023 1131 by Leti Delgado RN  Outcome: Progressing  Flowsheets (Taken 10/20/2023 2248 by Steffen Adrian RN)  Achieves optimal ventilation and oxygenation:   Assess for changes in respiratory status   Assess for changes in mentation and behavior   Position to facilitate oxygenation and minimize respiratory effort  10/20/2023 2248 by Steffen Adrian RN  Outcome: Progressing  Flowsheets (Taken 10/20/2023 2248)  Achieves optimal ventilation and oxygenation:   Assess for changes in respiratory status   Assess for changes in mentation and behavior   Position to facilitate oxygenation and minimize respiratory effort     Problem: Pain  Goal: Verbalizes/displays adequate comfort level or baseline comfort level  10/21/2023 1132 by Leti Delgado RN  Outcome: Progressing  Flowsheets (Taken 10/20/2023 2248 by Steffen Adrian RN)  Verbalizes/displays adequate comfort level or baseline comfort level:   Encourage patient to monitor pain and request assistance   Assess pain using appropriate pain scale   Administer analgesics based on type and severity of pain and evaluate response  10/20/2023 2248 by Steffen Adrian RN  Outcome: Progressing  Flowsheets (Taken 10/20/2023 2248)  Verbalizes/displays adequate comfort level or baseline comfort level:   Encourage patient to monitor pain and request assistance   Assess pain using appropriate pain scale   Administer analgesics based on type and severity of pain and evaluate response   Nursing care plan of care reviewed with Saumya Parsons with agreement and verbalization of active participation and goal setting.
Problem: Respiratory - Adult  Goal: Achieves optimal ventilation and oxygenation  Outcome: Progressing     Problem: Respiratory - Adult  Goal: Will be able to breathe spontaneously, without ventilator support  Description: Will be able to breathe spontaneously, without ventilator support  Outcome: Progressing                                                  Patient Weaning Progress    The patient's vent settings was able to be weaned this shift. Ventilator settings that were weaned              [] Mode   [x] Pressure support weaned   [] Fio2 weaned   [] Peep weaned      Spontaneous weaning trial  was not attempted. Due to defined parameters for SBT (spontaneous breathing trial) not being met. Patient going to surgery today for diverting colostomy. Reason that defined ventilator parameters for SBT was not met              [] Patient condition requires increased ventilator settings  [] Requires increased sedation   [] Settings not within weaning range   [] SAT not completed   [] Physician orders    Evac tube was  hooked up with continuous low suction(20-30mmHg)      Cuff was not  deflated to determine cuff leak. Unable to get agreement for goals because no family is present and patient cannot respond.
Problem: Respiratory - Adult  Goal: Achieves optimal ventilation and oxygenation  Outcome: Progressing     Problem: Respiratory - Adult  Goal: Will be able to breathe spontaneously, without ventilator support  Description: Will be able to breathe spontaneously, without ventilator support  Outcome: Progressing                                                  Patient Weaning Progress    The patient's vent settings was able to be weaned this shift. Ventilator settings that were weaned              [] Mode   [x] Pressure support weaned   [] Fio2 weaned   [] Peep weaned      Spontaneous weaning trial  was not attempted. Due to defined parameters for SBT (spontaneous breathing trial) not being met. Reason that defined ventilator parameters for SBT was not met              [] Patient condition requires increased ventilator settings  [x] Requires increased sedation   [] Settings not within weaning range   [] SAT not completed   [] Physician orders    Evac tube was  hooked up with continuous low suction(20-30mmHg)      Cuff was not  deflated to determine cuff leak. Unable to get agreement for goals because no family is present and patient cannot respond.
Problem: Respiratory - Adult  Goal: Achieves optimal ventilation and oxygenation  Outcome: Progressing     Problem: Respiratory - Adult  Goal: Will be able to breathe spontaneously, without ventilator support  Description: Will be able to breathe spontaneously, without ventilator support  Outcome: Progressing                                                Patient Weaning Progress    The patient's vent settings was able to be weaned this shift. Ventilator settings that were weaned              [] Mode   [x] Pressure support weaned   [x] Fio2 weaned   [] Peep weaned      Spontaneous weaning trial  was not attempted. due to defined parameters for SBT (spontaneous breathing trial) not being met. The patient is agitated, and has increased work of breathing. Will attempt SBT when patient can tolerate. Evac tube was not  hooked up with continuous low suction(20-30mmHg)      Cuff was  deflated to determine cuff leak. A leak  was detected. Family mutually agreed on goals.
Problem: Respiratory - Adult  Goal: Achieves optimal ventilation and oxygenation  Outcome: Progressing  Flowsheets (Taken 10/21/2023 2220 by Luanne Oreilly RN)  Achieves optimal ventilation and oxygenation:   Assess for changes in respiratory status   Assess for changes in mentation and behavior   Position to facilitate oxygenation and minimize respiratory effort     Problem: Pain  Goal: Verbalizes/displays adequate comfort level or baseline comfort level  Flowsheets (Taken 10/21/2023 2220 by Luanne Oreilly RN)  Verbalizes/displays adequate comfort level or baseline comfort level:   Encourage patient to monitor pain and request assistance   Assess pain using appropriate pain scale   Administer analgesics based on type and severity of pain and evaluate response     Nursing care plan of care reviewed with GRUMS with agreement and verbalization of active participation and goal setting.
Problem: Respiratory - Adult  Goal: Will be able to breathe spontaneously, without ventilator support  Description: Will be able to breathe spontaneously, without ventilator support  10/11/2023 0626 by Zak Mancia RCP  Outcome: Not Progressing     Problem: Respiratory - Adult  Goal: Achieves optimal ventilation and oxygenation  10/11/2023 0626 by Zak Mancia RCP  Outcome: Progressing                                                  Patient Weaning Progress    The patient's vent settings was not able to be weaned this shift. Ventilator settings that were weaned              [] Mode   [] Pressure support weaned   [] Fio2 weaned   [] Peep weaned      Spontaneous weaning trial  was not attempted. due to defined parameters for SBT (spontaneous breathing trial) not being met. Evac tube was  hooked up with continuous low suction(20-30mmHg)      Cuff was not  deflated to determine cuff leak. Unable to get agreement for goals because no family is present and patient cannot respond.
Problem: Respiratory - Adult  Goal: Will be able to breathe spontaneously, without ventilator support  Description: Will be able to breathe spontaneously, without ventilator support  10/14/2023 0816 by Bryan Gerardo RCP  Outcome: Progressing                                                Patient Weaning Progress    The patient's vent settings was not able to be weaned this shift. Ventilator settings that were weaned              [] Mode   [] Pressure support weaned   [] Fio2 weaned   [] Peep weaned      Spontaneous weaning trial  was not attempted, due to defined parameters for SBT (spontaneous breathing trial) not being met. Reason that defined ventilator parameters for SBT was not met              [x] Patient condition requires increased ventilator settings  [] Requires increased sedation   [] Settings not within weaning range   [] SAT not completed   [] Physician orders      Evac tube was  hooked up with continuous low suction(20-30mmHg)      Unable to get agreement for goals because no family is present and patient cannot respond.
Problem: Respiratory - Adult  Goal: Will be able to breathe spontaneously, without ventilator support  Description: Will be able to breathe spontaneously, without ventilator support  10/15/2023 1302 by Kayleigh DHALIWAL RCP  Outcome: Progressing   Vent setting optimized to achieve target tidal volume, respiratory rate and ideal oxygen saturations. SBT will be performed when appropriate. Patient Weaning Progress    The patient's vent settings was not able to be weaned this shift. Ventilator settings that were weaned              [] Mode   [] Pressure support weaned   [] Fio2 weaned   [] Peep weaned      Spontaneous weaning trial  was attempted. Reason that defined ventilator parameters for SBT was not met              [] Patient condition requires increased ventilator settings  [] Requires increased sedation   [] Settings not within weaning range   [] SAT not completed   [] Physician orders    The patient was not able to tolerate SBT. Spontanteous VT was 300 and RR 35 breaths/min. The patient was on SBT for 4 minutes     Evac tube was  hooked up with continuous low suction(20-30mmHg)      Cuff was not  deflated to determine cuff leak. Unable to get agreement for goals because no family is present and patient cannot respond.
Problem: Respiratory - Adult  Goal: Will be able to breathe spontaneously, without ventilator support  Description: Will be able to breathe spontaneously, without ventilator support  Outcome: Progressing   Vent setting optimized to achieve target tidal volume, respiratory rate and ideal oxygen saturations. SBT will be performed when appropriate. Patient Weaning Progress    The patient's vent settings was not able to be weaned this shift. Ventilator settings that were weaned              [] Mode   [] Pressure support weaned   [] Fio2 weaned   [] Peep weaned      Spontaneous weaning trial  was not attempted. due to defined parameters for SBT (spontaneous breathing trial) not being met. Reason that defined ventilator parameters for SBT was not met              [x] Patient condition requires increased ventilator settings  [x] Requires increased sedation   [] Settings not within weaning range   [] SAT not completed   [] Physician orders    Pt sedated from [address] tube was  hooked up with continuous low suction(20-30mmHg)      Cuff was not  deflated to determine cuff leak. Unable to get agreement for goals because no family is present and patient cannot respond.
Problem: Safety - Medical Restraint  Goal: Remains free of injury from restraints (Restraint for Interference with Medical Device)  Description: INTERVENTIONS:  1. Determine that other, less restrictive measures have been tried or would not be effective before applying the restraint  2. Evaluate the patient's condition at the time of restraint application  3. Inform patient/family regarding the reason for restraint  4. Q2H: Monitor safety, psychosocial status, comfort, nutrition and hydration  Outcome: Progressing  Flowsheets (Taken 10/14/2023 0000 by Hua Shelby RN)  Remains free of injury from restraints (restraint for interference with medical device):   Determine that other, less restrictive measures have been tried or would not be effective before applying the restraint   Evaluate the patient's condition at the time of restraint application   Inform patient/family regarding the reason for restraint   Every 2 hours: Monitor safety, psychosocial status, comfort, nutrition and hydration    Care plan reviewed with patient and family. family  verbalize understanding of the plan of care and contribute to goal setting. Pt is intubated andsedated.
Problem: Safety - Medical Restraint  Goal: Remains free of injury from restraints (Restraint for Interference with Medical Device)  Description: INTERVENTIONS:  1. Determine that other, less restrictive measures have been tried or would not be effective before applying the restraint  2. Evaluate the patient's condition at the time of restraint application  3. Inform patient/family regarding the reason for restraint  4. Q2H: Monitor safety, psychosocial status, comfort, nutrition and hydration  Outcome: Progressing  Flowsheets (Taken 10/14/2023 2000 by Pepito Cantu RN)  Remains free of injury from restraints (restraint for interference with medical device):   Determine that other, less restrictive measures have been tried or would not be effective before applying the restraint   Evaluate the patient's condition at the time of restraint application   Inform patient/family regarding the reason for restraint   Every 2 hours: Monitor safety, psychosocial status, comfort, nutrition and hydration    Care plan reviewed with patient and family. Family verbalize understanding of the plan of care and contribute to goal setting.
Received sign out from ICU. Patient to be transferred to [facility]. Hospitalist to resume care. Patient with persistent tachyardia since admission. Was treated for respiratory failure, sepsis, sacral wound/abscess, and alcohol withdrawal.  Now with hypertension on cardene gtt. BP improved. Will attempt to titrate off and ongo PO meds overnight/tomorrow AM.  Continue zosyn for now. Patient will require open PEG tube. General surgery following.
[facility]  Department of Critical Care Medicine   ICU Night Call Note        Call initiated by: Nursing staff:  Heladio Wagner and Brian Bolivar RN  Call addressed around: 10/15/2023 8:52 PM      Reason for call:  1. Fevering high since noon today and frequently treated with prn Tylenol without improvement. Temp max 102.2 at 19:00.                                2. Uncontrolled pain giving prn Norco appear not working on RN point of view. 3. Unable to wean Levo gtt, only on low dose. RN suggesting arterial line placement. Orders placed: Fentanyl gtt for pain and as sedation. Hold Precedex gtt for now. Hold prn Norco. Ibuprofen 800 mg once for fever of 102.2    Was called at bedside to assess patient. Patient was seen and examined. Patient is currently on mechanical ventilator appearing uncomfortable and nod yes to pain. Currently sedated with Prcedex and propofol gtt. NS 0.9 continuously infusing at 100 cc/h. Brian Bolivar RN concern for controlling patient pain in a better way and also concern for medicating patient with PRN [place] that she think it's not helping the patient with large right gluteal wound secondary to perirectal abscess. Reported that patient underwent wound debridement last Friday (10/13), and plan for another wound debridement tomorrow (10/16). Norco, Pecedex gtt, and propofol gtt are all contributing factors for hypotensive. Will switch to Fentanyl to target pain control and as sedation. Holding Norco and precedex gtt. May resume once patient stable hemodynamically or may switch back to precedex gtt if there is a plan for extubation. Currently on 5-6 mcg of Levophed. We will try to wean off. Will continue to closely monitor hemodynamically.       FRANCE Aldrich-CNP   Critical Care Medicine  10/15/2023 8:52 PM
RN  Outcome: Progressing     Problem: Safety - Adult  Goal: Free from fall injury  10/11/2023 0456 by Sandy Sutton RN  Outcome: Progressing  Note: Falling star program remains in place. Call light and personal belongings within reach. Frequent visual monitoring continues. Toileting program inplace. Patient assisted in turning/repositioning at least once every 2 hours, and on a prn basis. Problem: Skin/Tissue Integrity  Goal: Absence of new skin breakdown  Description: 1. Monitor for areas of redness and/or skin breakdown  2. Assess vascular access sites hourly  3. Every 4-6 hours minimum:  Change oxygen saturation probe site  4. Every 4-6 hours:  If on nasal continuous positive airway pressure, respiratory therapy assess nares and determine need for appliance change or resting period. Outcome: Progressing  Note: Monitoring patient skin integrity for skin breakdown, turning and repositioning q2h per protocol.
RN)  Achieves optimal ventilation and oxygenation:   Assess for changes in mentation and behavior   Assess for changes in respiratory status   Oxygen supplementation based on oxygen saturation or arterial blood gases   Initiate smoking cessation protocol as indicated  10/11/2023 1405 by Mark Cruz RCP  Outcome: Progressing     Problem: Respiratory - Adult  Goal: Will be able to breathe spontaneously, without ventilator support  Description: Will be able to breathe spontaneously, without ventilator support  10/11/2023 2044 by Geeta Vee RN  Outcome: Progressing  10/11/2023 1405 by Mark Cruz RCP  Outcome: Not Progressing     Problem: Pain  Goal: Verbalizes/displays adequate comfort level or baseline comfort level  Outcome: Progressing  Flowsheets (Taken 10/11/2023 2044)  Verbalizes/displays adequate comfort level or baseline comfort level:   Administer analgesics based on type and severity of pain and evaluate response   Assess pain using appropriate pain scale   Implement non-pharmacological measures as appropriate and evaluate response     Problem: Nutrition Deficit:  Goal: Optimize nutritional status  Outcome: Progressing  Flowsheets (Taken 10/10/2023 1231 by Arline Cabrera RD, LD)  Nutrient intake appropriate for improving, restoring, or maintaining nutritional needs: Assess nutritional status and recommend course of action  Note: Tube feed started today.      Problem: Safety - Adult  Goal: Free from fall injury  Outcome: Progressing  Flowsheets (Taken 10/11/2023 2044)  Free From Fall Injury: Instruct family/caregiver on patient safety     Problem: Respiratory - Adult  Goal: Will be able to breathe spontaneously, without ventilator support  Description: Will be able to breathe spontaneously, without ventilator support  10/11/2023 2044 by Geeta Vee RN  Outcome: Progressing  10/11/2023 1405 by Mark Cruz RCP  Outcome: Not Progressing     Problem: Skin/Tissue
for possible contributors to thought disturbance, including medications, impaired vision or hearing, underlying metabolic abnormalities, dehydration, psychiatric diagnoses, and notify attending LIP  2. [place] high risk fall precautions, as indicated  3. Provide frequent short contacts to provide reality reorientation, refocusing and direction  4. Decrease environmental stimuli, including noise as appropriate  5. Monitor and intervene to maintain adequate nutrition, hydration, elimination, sleep and activity  6. If unable to ensure safety without constant attention obtain sitter and review sitter guidelines with assigned personnel  7.  Initiate Psychosocial CNS and Spiritual Care consult, as indicated  Outcome: Progressing  Flowsheets (Taken 10/18/2023 2000)  Effect of thought disturbance (confusion, delirium, dementia, or psychosis) are managed with adequate functional status: Assess for contributors to thought disturbance, including medications, impaired vision or hearing, underlying metabolic abnormalities, dehydration, psychiatric diagnoses, notify LIP     Problem: Neurosensory - Adult  Goal: Achieves stable or improved neurological status  Outcome: Progressing  Goal: Absence of seizures  Outcome: Progressing  Goal: Remains free of injury related to seizures activity  Outcome: Progressing     Problem: Cardiovascular - Adult  Goal: Maintains optimal cardiac output and hemodynamic stability  Outcome: Progressing  Goal: Absence of cardiac dysrhythmias or at baseline  Outcome: Progressing     Problem: Gastrointestinal - Adult  Goal: Minimal or absence of nausea and vomiting  Outcome: Progressing  Flowsheets (Taken 10/18/2023 2000)  Minimal or absence of nausea and vomiting: Administer IV fluids as ordered to ensure adequate hydration  Goal: Maintains or returns to baseline bowel function  Outcome: Progressing  Flowsheets (Taken 10/18/2023 2000)  Maintains or returns to baseline bowel function: Assess bowel
(Taken 10/24/2023 0013)  Achieves stable or improved neurological status: Assess for and report changes in neurological status     Problem: Neurosensory - Adult  Goal: Absence of seizures  Outcome: Progressing  Flowsheets (Taken 10/24/2023 0013)  Absence of seizures: Monitor for seizure activity.   If seizure occurs, document type and location of movements and any associated apnea     Problem: Neurosensory - Adult  Goal: Remains free of injury related to seizures activity  Outcome: Progressing  Flowsheets (Taken 10/24/2023 0013)  Remains free of injury related to seizure activity:   Maintain airway, patient safety  and administer oxygen as ordered   Seizure pads on all 4 side rails     Problem: Cardiovascular - Adult  Goal: Maintains optimal cardiac output and hemodynamic stability  Outcome: Progressing  Flowsheets (Taken 10/24/2023 0013)  Maintains optimal cardiac output and hemodynamic stability: Monitor blood pressure and heart rate     Problem: Cardiovascular - Adult  Goal: Absence of cardiac dysrhythmias or at baseline  Outcome: Progressing  Flowsheets (Taken 10/24/2023 0013)  Absence of cardiac dysrhythmias or at baseline: Monitor cardiac rate and rhythm     Problem: Gastrointestinal - Adult  Goal: Minimal or absence of nausea and vomiting  Outcome: Progressing  Flowsheets (Taken 10/24/2023 0013)  Minimal or absence of nausea and vomiting: Administer IV fluids as ordered to ensure adequate hydration     Problem: Gastrointestinal - Adult  Goal: Maintains or returns to baseline bowel function  Outcome: Progressing  Flowsheets (Taken 10/24/2023 0013)  Maintains or returns to baseline bowel function:   Assess bowel function   Administer ordered medications as needed   Administer IV fluids as ordered to ensure adequate hydration     Problem: Gastrointestinal - Adult  Goal: Maintains adequate nutritional intake  Outcome: Progressing  Flowsheets (Taken 10/24/2023 0013)  Maintains adequate nutritional intake: Monitor
and Maintained or Improved: Monitor and assess patient's chronic conditions and comorbid symptoms for stability, deterioration, or improvement
course of action  Note: Tube feed started today. Problem: Safety - Adult  Goal: Free from fall injury  10/12/2023 1033 by Aníbal Dominguez RN  Outcome: Progressing  10/11/2023 2044 by Mica Cavazos RN  Outcome: Progressing  Flowsheets (Taken 10/11/2023 2044)  Free From Fall Injury: Instruct family/caregiver on patient safety     Problem: Skin/Tissue Integrity  Goal: Absence of new skin breakdown  Description: 1. Monitor for areas of redness and/or skin breakdown  2. Assess vascular access sites hourly  3. Every 4-6 hours minimum:  Change oxygen saturation probe site  4. Every 4-6 hours:  If on nasal continuous positive airway pressure, respiratory therapy assess nares and determine need for appliance change or resting period. 10/12/2023 1033 by Aníbal Dominguez RN  Outcome: Not Progressing  10/11/2023 2044 by Mica Cavazos RN  Outcome: Not Progressing  Note: Had blister on left buttocks that opened and skin sloughed. Problem: Confusion  Goal: Confusion, delirium, dementia, or psychosis is improved or at baseline  Description: INTERVENTIONS:  1. Assess for possible contributors to thought disturbance, including medications, impaired vision or hearing, underlying metabolic abnormalities, dehydration, psychiatric diagnoses, and notify attending LIP  2. [place] high risk fall precautions, as indicated  3. Provide frequent short contacts to provide reality reorientation, refocusing and direction  4. Decrease environmental stimuli, including noise as appropriate  5. Monitor and intervene to maintain adequate nutrition, hydration, elimination, sleep and activity  6. If unable to ensure safety without constant attention obtain sitter and review sitter guidelines with assigned personnel  7.  Initiate Psychosocial CNS and Spiritual Care consult, as indicated  Outcome: Not Progressing
Progressing  Flowsheets (Taken 10/20/2023 2248)  Minimal or absence of nausea and vomiting:   Administer IV fluids as ordered to ensure adequate hydration   Maintain NPO status until nausea and vomiting are resolved   Nasogastric tube to low intermittent suction as ordered     Problem: Gastrointestinal - Adult  Goal: Maintains or returns to baseline bowel function  Outcome: Progressing  Flowsheets (Taken 10/20/2023 2248)  Maintains or returns to baseline bowel function:   Encourage oral fluids to ensure adequate hydration   Administer IV fluids as ordered to ensure adequate hydration   Assess bowel function     Problem: Gastrointestinal - Adult  Goal: Maintains adequate nutritional intake  Outcome: Progressing  Flowsheets (Taken 10/20/2023 2248)  Maintains adequate nutritional intake:   Monitor percentage of each meal consumed   Identify factors contributing to decreased intake, treat as appropriate   Assist with meals as needed     Problem: Genitourinary - Adult  Goal: Urinary catheter remains patent  Outcome: Progressing  Flowsheets (Taken 10/20/2023 2248)  Urinary catheter remains patent:   Assess patency of urinary catheter   Irrigate catheter per Licensed Independent Practitioner order if indicated and notify Licensed Independent Practitioner if unable to irrigate     Problem: Metabolic/Fluid and Electrolytes - Adult  Goal: Electrolytes maintained within normal limits  Outcome: Progressing  Flowsheets (Taken 10/20/2023 2248)  Electrolytes maintained within normal limits:   Monitor labs and assess patient for signs and symptoms of electrolyte imbalances   Administer electrolyte replacement as ordered   Monitor response to electrolyte replacements, including repeat lab results as appropriate     Problem: Metabolic/Fluid and Electrolytes - Adult  Goal: Hemodynamic stability and optimal renal function maintained  Outcome: Progressing  Flowsheets (Taken 10/20/2023 2248)  Hemodynamic stability and optimal renal
range  Outcome: Progressing  Flowsheets (Taken 10/13/2023 1719)  Glucose maintained within prescribed range:   Monitor blood glucose as ordered   Assess for signs and symptoms of hyperglycemia and hypoglycemia   Administer ordered medications to maintain glucose within target range   Assess barriers to adequate nutritional intake and initiate nutrition consult as needed   Instruct patient on self management of diabetes and initiate consult as needed     Care plan reviewed with patient and parent. Patient and parent verbalize understanding of the plan of care and contribute to goal setting.     Electronically signed by Renato Stover RN on 10/13/2023 at 5:30 PM

## 2023-10-24 NOTE — FLOWSHEET NOTE
10/24/23 1033   Safe Environment   Safety Measures Other (comment)  (virtual safety round)     VN called into patients room and introduced myself and role. Patient answered and permitted video. Video activated. Patient resting comfortably in bed. VN educated on using call light for safety.

## 2023-10-24 NOTE — CARE COORDINATION
10/13/23, 3:12 PM EDT    DISCHARGE ON GOING EVALUATION    [facility] day: 4  Location: -04/004-A Reason for admit: Cerebral brain hemorrhage [facility]) [I61.9]   Procedure:   10/9 CXR: No acute process  10/9 CT Head: Acute hematoma in the left basal ganglia measuring 2 x 1.3 x 1.6 cm in size with surrounding edema  10/9 CTA Head: Negative CTA of the head; Acute hemorrhage in the left basal ganglia seen on previous CT scan of the brain; Mild enlargement of the tonsils and adenoids  10/9 CTA Chest: No pulmonary embolism; Minimal right lower lung atelectasis/infiltrate  10/9 Repeat CT Head: No significant change in acute left basal ganglia intraparenchymal   hemorrhage  10/10 Intubated  10/10 CVC RIJ  10/11 CT Head: Stable  10/11 EEG: No seizures noted; suggests mod to severe encephalopathy  10/13 Sharp excisional drainage of stage IV gluteal and perineal decubitus wound 15 cm x 22 cm x 2.5 cm including skin, subcutanoues tissue and muscle; Incision and drainage of sacral abscess  10/13 CT Abd/pelvis: Perirectal subcutaneous emphysema and a large left perirectal soft tissue defect is presumably related to the recent drainage procedure. Packing material is noted in the left perirectal soft tissues. Subcutaneous emphysema and thickening of the left gluteus musculature is seen diffusely. No discrete fluid collection is identified. No free fluid or fluid collections are noted within the abdomen/pelvis. No bowel obstruction or free intraperitoneal air is observed; Hepatomegaly and hepatic steatosis are present. Trace perihepatic ascites, cholelithiasis, and nonspecific gallbladder distention is observed. Correlate with liver function tests. No biliary ductal dilatation or liver lesion is identified; Patchy bilateral lower lobe airspace opacities, right greater than left, suggests pneumonia in the appropriate clinical setting. Follow-up to ensure resolution is advised;  The urinary bladder wall is thickened in
10/20/23, 12:35 PM EDT    DISCHARGE ON [facility] day: 11  Location: -04/004-A Reason for admit: Cerebral brain hemorrhage [facility]) [I61.9]   Procedure:   10/9 CXR: No acute process  10/9 CT Head: Acute hematoma in the left basal ganglia measuring 2 x 1.3 x 1.6 cm in size with surrounding edema  10/9 CTA Head: Negative CTA of the head; Acute hemorrhage in the left basal ganglia seen on previous CT scan of the brain; Mild enlargement of the tonsils and adenoids  10/9 CTA Chest: No pulmonary embolism; Minimal right lower lung atelectasis/infiltrate  10/9 Repeat CT Head: No significant change in acute left basal ganglia intraparenchymal   hemorrhage  10/10 Intubated  10/10 CVC RIJ  10/11 CT Head: Stable  10/11 EEG: No seizures noted; suggests mod to severe encephalopathy  10/13 Sharp excisional drainage of stage IV gluteal and perineal decubitus wound 15 cm x 22 cm x 2.5 cm including skin, subcutanoues tissue and muscle; Incision and drainage of sacral abscess  10/13 CT Abd/pelvis: Perirectal subcutaneous emphysema and a large left perirectal soft tissue defect is presumably related to the recent drainage procedure. Packing material is noted in the left perirectal soft tissues. Subcutaneous emphysema and thickening of the left gluteus musculature is seen diffusely. No discrete fluid collection is identified. No free fluid or fluid collections are noted within the abdomen/pelvis. No bowel obstruction or free intraperitoneal air is observed; Hepatomegaly and hepatic steatosis are present. Trace perihepatic ascites, cholelithiasis, and nonspecific gallbladder distention is observed. Correlate with liver function tests. No biliary ductal dilatation or liver lesion is identified; Patchy bilateral lower lobe airspace opacities, right greater than left, suggests pneumonia in the appropriate clinical setting. Follow-up to ensure resolution is advised;  The urinary bladder wall is thickened
10/23/23 7:00 AM    Pt transferred to 4A08. Handoff report given to GABBY Davila CM.
10/24/23, 12:11 PM EDT    DISCHARGE ON GOING Lizbeth Dee day: 15  Location: -08/008-A Reason for admit: Cerebral brain hemorrhage [facility]) [I61.9]   Procedure:   10/9 CXR: No acute process  10/9 CT Head: Acute hematoma in the left basal ganglia measuring 2 x 1.3 x 1.6 cm in size with surrounding edema  10/9 CTA Head: Negative CTA of the head; Acute hemorrhage in the left basal ganglia seen on previous CT scan of the brain; Mild enlargement of the tonsils and adenoids  10/9 CTA Chest: No pulmonary embolism; Minimal right lower lung atelectasis/infiltrate  10/9 Repeat CT Head: No significant change in acute left basal ganglia intraparenchymal   hemorrhage  10/10 Intubated  10/10 CVC RIJ  10/11 CT Head: Stable  10/11 EEG: No seizures noted; suggests mod to severe encephalopathy  10/13 Sharp excisional drainage of stage IV gluteal and perineal decubitus wound 15 cm x 22 cm x 2.5 cm including skin, subcutanoues tissue and muscle; Incision and drainage of sacral abscess  10/13 CT Abd/pelvis: Perirectal subcutaneous emphysema and a large left perirectal soft tissue defect is presumably related to the recent drainage procedure. Packing material is noted in the left perirectal soft tissues. Subcutaneous emphysema and thickening of the left gluteus musculature is seen diffusely. No discrete fluid collection is identified. No free fluid or fluid collections are noted within the abdomen/pelvis. No bowel obstruction or free intraperitoneal air is observed; Hepatomegaly and hepatic steatosis are present. Trace perihepatic ascites, cholelithiasis, and nonspecific gallbladder distention is observed. Correlate with liver function tests. No biliary ductal dilatation or liver lesion is identified; Patchy bilateral lower lobe airspace opacities, right greater than left, suggests pneumonia in the appropriate clinical setting. Follow-up to ensure resolution is advised;  The urinary bladder wall is thickened
10/24/23, 2:46 PM EDT    Patient goals/plan/ treatment preferences discussed by  and . Patient goals/plan/ treatment preferences reviewed with patient/ family. Patient/ family verbalize understanding of discharge plan and are in agreement with goal/plan/treatment preferences. Understanding was demonstrated using the teach back method. AVS provided by RN at time of discharge, which includes all necessary medical information pertaining to the patients current course of illness, treatment, post-discharge goals of care, and treatment preferences.      Services At/After Discharge: LTAC
Case Management Assessment  Initial Evaluation    Date/Time of Evaluation: 10/10/2023 11:49 AM  Assessment Completed by: Alison Haines RN    If patient is discharged prior to next notation, then this note serves as note for discharge by case management. Patient Name: Nate Whittaker                   YOB: 1980  Diagnosis: Cerebral brain hemorrhage [facility]) [I61.9]                   Date / Time: 10/9/2023  1:32 PM  Location: [address] [place] [address]     Patient Admission Status: Inpatient   Readmission Risk Low 0-14, Mod 15-19), High > 20: No data recorded  Current PCP: No primary care provider on file. PCP verified by CM? No (JERRI; sedated on vent, no family present)    Chart Reviewed: Yes      History Provided by: Medical Record, Other (see comment) (No family present; LM for mother, Saba Christina, to call CM)  Patient Orientation: Sedated (Sedated on vent)    Patient Cognition: Other (see comment) (Sedated on vent)    Hospitalization in the last 30 days (Readmission):  No    If yes, Readmission Assessment in  Navigator will be completed. Advance Directives:      Code Status: Full Code   Patient's Primary Decision Maker is: Legal Next of Kin      Discharge Planning:    Patient lives with: Other (Comment) (JERRI; sedated on vent, no family present) Type of Home: Other (Comment) (JERRI; sedated on vent, no family present)  Primary Care Giver: Other (Comment) (JERRI; sedated on vent, no family present)  Patient Support Systems include: Parent, Family Members   Current Financial resources: Medicaid  Current community resources: Other (Comment) (JERRI; sedated on vent, no family present)  Current services prior to admission: Other (Comment) (JERRI; sedated on vent, no family present)            Current DME:              Type of Home Care services:  None (JERRI; sedated on vent, no family present)    ADLS  Prior functional level: Other (see comment) (JERRI; sedated on vent, no family present)  Current functional level:  Other
DISCHARGE PLANNING EVALUATION  10/11/23, 2:07 PM EDT    Reason for Referral: Discharge planning. Mental Status:  pt alert and oriented pta. Pt is now intubated. Decision Making: pt was able to make his own decisions pta. Family/Social/Home Environment:  SW called mother x 2 and no return call. SW called pts daughter Eber Batres who states pts mother is older and would not understand a lot of what is going on. Eber Batres felt it would be better to contact her first and then she can explain to pts mother. Jammie states pt and his son Yue Leach live together. Pt had hip surgery 2 months ago and was still in the process of healing and rehabbing from that. Eber Batres states pt was using an electric scooter after surgery and is now using a cane. Pt is able to do his own laundry, cleaning, dishes, self care and drives. Eber Batres states pt does little cooking and enjoys going out out to eat. Current Services including food security, transportation and housekeeping: See note above. Current Equipment: Shower chair, electric scooter and uses a cane. Payment Source: [facility]. Concerns or Barriers to Discharge: None reported. Post-acute [facility]) provider list was provided to patient. Patient was informed of their freedom to choose [facility] provider. Discussed and offered to show the patient the relevant [facility] Providers quality and resource use measures on Medicare Compare web site via computer based on patient's goals of care and treatment preferences. Questions regarding selection process were answered. Teach Back Method used with pts daughter Eber Batres regarding care plan and discharge planning. Patients daughter verbalized understanding of the plan of care and contribute to goal setting. Patient goals, treatment preferences and discharge plan: no discharge plan at this time, pt on vent. Sw will continue to follow and assist with d/c needs.     Electronically signed by GWENDOLYN Cota on 10/11/2023 at 2:07 PM
Dr Silas Buckley ordered Cesario Garay from [place] notified, per previously noted pt preference. Precert to start Monday 10/23.
part due to under distention and decompression by White catheter. The urinary bladder is not well assessed. Correlate with urinalysis. Numerous chronic findings are discussed. 10/16 Robotic Diverting Colostomy; Debridement of Gluteal wound and Left thigh/leg; Sharp excisional debridement of skin subcutaneous tissue fat and muscle, measuring 38 x 28 x 4 cm  10/16 CXR: No consolidation or CHF. Patchy atelectasis right lower lobe  10/17 Extubated    Barriers to Discharge: POD #4/POD #1. Received 2 PRBC on 10/14. Taken back to surgery yesterday for I&D and diverting colostomy - see note above. Extubated today. Failed swallow eval. NPO. Sats 100% on 2L O2. Afebrile. 's. Follows commands. Colostomy with stoma red/moist, small soft brown stool noted. SLP/PT/OT. Addiction Services consulted. Dietitian and Wound Care following. Intensivist and General Surgery following. Respiratory cutlure +Moraxella catarrhalis. Wound +Pseudomonas aeruginosa. Daily dressing changes w/Dakins. Telemetry, CVC, wound care, ostomy care, white, SCDs. IVF, peridex, IV folic acid, prevacid, IV keppra, synthroid, prn IV morphine, multivitamin, IV zosyn, dakins daily, IV thiamine, Electrolyte replacement protocols. Na+ 147, wbc 10.9, hgb 7. PCP: No primary care provider on file. Readmission Risk Score: 16.1%  Patient Goals/Plan/Treatment Preferences: From home w/son. Uses a cane, has SC and electric scooter. Plan pending clinical course. May need LTACH. SW on case.

## 2023-10-25 LAB
ALBUMIN SERPL BCG-MCNC: 2.7 G/DL (ref 3.5–5.1)
ALP SERPL-CCNC: 274 U/L (ref 38–126)
ALT SERPL W/O P-5'-P-CCNC: 25 U/L (ref 11–66)
ANION GAP SERPL CALC-SCNC: 12 MEQ/L (ref 8–16)
AST SERPL-CCNC: 37 U/L (ref 5–40)
BASOPHILS ABSOLUTE: 0.1 THOU/MM3 (ref 0–0.1)
BASOPHILS NFR BLD AUTO: 0.7 %
BILIRUB SERPL-MCNC: 0.4 MG/DL (ref 0.3–1.2)
BUN SERPL-MCNC: 12 MG/DL (ref 7–22)
CALCIUM SERPL-MCNC: 8.9 MG/DL (ref 8.5–10.5)
CHLORIDE SERPL-SCNC: 98 MEQ/L (ref 98–111)
CO2 SERPL-SCNC: 27 MEQ/L (ref 23–33)
CREAT SERPL-MCNC: 0.6 MG/DL (ref 0.4–1.2)
DEPRECATED RDW RBC AUTO: 49.6 FL (ref 35–45)
EOSINOPHIL NFR BLD AUTO: 0.5 %
EOSINOPHILS ABSOLUTE: 0 THOU/MM3 (ref 0–0.4)
ERYTHROCYTE [DISTWIDTH] IN BLOOD BY AUTOMATED COUNT: 15.4 % (ref 11.5–14.5)
GFR SERPL CREATININE-BSD FRML MDRD: > 60 ML/MIN/1.73M2
GLUCOSE SERPL-MCNC: 114 MG/DL (ref 70–108)
HCT VFR BLD AUTO: 31 % (ref 42–52)
HGB BLD-MCNC: 9.8 GM/DL (ref 14–18)
IMM GRANULOCYTES # BLD AUTO: 0.07 THOU/MM3 (ref 0–0.07)
IMM GRANULOCYTES NFR BLD AUTO: 0.7 %
LYMPHOCYTES ABSOLUTE: 1.7 THOU/MM3 (ref 1–4.8)
LYMPHOCYTES NFR BLD AUTO: 17.8 %
MCH RBC QN AUTO: 28.2 PG (ref 26–33)
MCHC RBC AUTO-ENTMCNC: 31.6 GM/DL (ref 32.2–35.5)
MCV RBC AUTO: 89.3 FL (ref 80–94)
MONOCYTES ABSOLUTE: 1 THOU/MM3 (ref 0.4–1.3)
MONOCYTES NFR BLD AUTO: 11 %
NEUTROPHILS NFR BLD AUTO: 69.3 %
NRBC BLD AUTO-RTO: 0 /100 WBC
PLATELET # BLD AUTO: 413 THOU/MM3 (ref 130–400)
PMV BLD AUTO: 9.5 FL (ref 9.4–12.4)
POTASSIUM SERPL-SCNC: 3.8 MEQ/L (ref 3.5–5.2)
PREALB SERPL-MCNC: 11.2 MG/DL (ref 20–40)
PROT SERPL-MCNC: 7 G/DL (ref 6.1–8)
RBC # BLD AUTO: 3.47 MILL/MM3 (ref 4.7–6.1)
SEGMENTED NEUTROPHILS ABSOLUTE COUNT: 6.6 THOU/MM3 (ref 1.8–7.7)
SODIUM SERPL-SCNC: 137 MEQ/L (ref 135–145)
WBC # BLD AUTO: 9.5 THOU/MM3 (ref 4.8–10.8)

## 2023-10-26 LAB
FERRITIN SERPL IA-MCNC: 380 NG/ML (ref 22–322)
FOLATE SERPL-MCNC: 17.7 NG/ML (ref 4.8–24.2)
HCT VFR BLD AUTO: 33.2 % (ref 42–52)
HGB BLD-MCNC: 10.3 GM/DL (ref 14–18)
IRON SERPL-MCNC: 17 UG/DL (ref 65–195)
T4 FREE SERPL-MCNC: 1.54 NG/DL (ref 0.93–1.76)
TIBC SERPL-MCNC: 124 UG/DL (ref 171–450)
TSH SERPL DL<=0.005 MIU/L-ACNC: 4.31 UIU/ML (ref 0.4–4.2)
VIT B12 SERPL-MCNC: > 2000 PG/ML (ref 211–911)

## 2023-10-27 ENCOUNTER — APPOINTMENT (OUTPATIENT)
Dept: GENERAL RADIOLOGY | Age: 43
End: 2023-10-27
Attending: INTERNAL MEDICINE
Payer: COMMERCIAL

## 2023-10-27 PROCEDURE — 2500000003 HC RX 250 WO HCPCS: Performed by: INTERNAL MEDICINE

## 2023-10-27 PROCEDURE — 92611 MOTION FLUOROSCOPY/SWALLOW: CPT

## 2023-10-27 PROCEDURE — 74230 X-RAY XM SWLNG FUNCJ C+: CPT

## 2023-10-27 RX ADMIN — BARIUM SULFATE 20 ML: 400 SUSPENSION ORAL at 08:48

## 2023-10-27 RX ADMIN — BARIUM SULFATE 10 ML: 400 PASTE ORAL at 08:48

## 2023-10-27 RX ADMIN — BARIUM SULFATE 20 ML: 0.81 POWDER, FOR SUSPENSION ORAL at 08:48
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
## 2023-10-30 LAB
ALBUMIN SERPL BCG-MCNC: 2.8 G/DL (ref 3.5–5.1)
ALP SERPL-CCNC: 275 U/L (ref 38–126)
ALT SERPL W/O P-5'-P-CCNC: 24 U/L (ref 11–66)
ANION GAP SERPL CALC-SCNC: 12 MEQ/L (ref 8–16)
AST SERPL-CCNC: 34 U/L (ref 5–40)
BASOPHILS ABSOLUTE: 0.1 THOU/MM3 (ref 0–0.1)
BASOPHILS NFR BLD AUTO: 0.9 %
BILIRUB CONJ SERPL-MCNC: < 0.2 MG/DL (ref 0–0.3)
BILIRUB SERPL-MCNC: 0.2 MG/DL (ref 0.3–1.2)
BUN SERPL-MCNC: 24 MG/DL (ref 7–22)
CALCIUM SERPL-MCNC: 9.4 MG/DL (ref 8.5–10.5)
CHLORIDE SERPL-SCNC: 96 MEQ/L (ref 98–111)
CO2 SERPL-SCNC: 25 MEQ/L (ref 23–33)
CREAT SERPL-MCNC: 0.5 MG/DL (ref 0.4–1.2)
DEPRECATED RDW RBC AUTO: 51.9 FL (ref 35–45)
EOSINOPHIL NFR BLD AUTO: 1.4 %
EOSINOPHILS ABSOLUTE: 0.2 THOU/MM3 (ref 0–0.4)
ERYTHROCYTE [DISTWIDTH] IN BLOOD BY AUTOMATED COUNT: 15.2 % (ref 11.5–14.5)
GFR SERPL CREATININE-BSD FRML MDRD: > 60 ML/MIN/1.73M2
GLUCOSE SERPL-MCNC: 103 MG/DL (ref 70–108)
HCT VFR BLD AUTO: 32.1 % (ref 42–52)
HGB BLD-MCNC: 9.7 GM/DL (ref 14–18)
IMM GRANULOCYTES # BLD AUTO: 0.21 THOU/MM3 (ref 0–0.07)
IMM GRANULOCYTES NFR BLD AUTO: 1.8 %
LYMPHOCYTES ABSOLUTE: 2.3 THOU/MM3 (ref 1–4.8)
LYMPHOCYTES NFR BLD AUTO: 19.9 %
MCH RBC QN AUTO: 28.3 PG (ref 26–33)
MCHC RBC AUTO-ENTMCNC: 30.2 GM/DL (ref 32.2–35.5)
MCV RBC AUTO: 93.6 FL (ref 80–94)
MONOCYTES ABSOLUTE: 1.4 THOU/MM3 (ref 0.4–1.3)
MONOCYTES NFR BLD AUTO: 11.9 %
NEUTROPHILS NFR BLD AUTO: 64.1 %
NRBC BLD AUTO-RTO: 0 /100 WBC
PLATELET # BLD AUTO: 701 THOU/MM3 (ref 130–400)
PMV BLD AUTO: 8.5 FL (ref 9.4–12.4)
POTASSIUM SERPL-SCNC: 4.7 MEQ/L (ref 3.5–5.2)
PROT SERPL-MCNC: 7.5 G/DL (ref 6.1–8)
RBC # BLD AUTO: 3.43 MILL/MM3 (ref 4.7–6.1)
SEGMENTED NEUTROPHILS ABSOLUTE COUNT: 7.6 THOU/MM3 (ref 1.8–7.7)
SODIUM SERPL-SCNC: 133 MEQ/L (ref 135–145)
WBC # BLD AUTO: 11.8 THOU/MM3 (ref 4.8–10.8)

## 2023-11-06 LAB
ALBUMIN SERPL BCG-MCNC: 2.8 G/DL (ref 3.5–5.1)
ALP SERPL-CCNC: 294 U/L (ref 38–126)
ALT SERPL W/O P-5'-P-CCNC: 44 U/L (ref 11–66)
ANION GAP SERPL CALC-SCNC: 7 MEQ/L (ref 8–16)
AST SERPL-CCNC: 45 U/L (ref 5–40)
BASOPHILS ABSOLUTE: 0.1 THOU/MM3 (ref 0–0.1)
BASOPHILS NFR BLD AUTO: 0.7 %
BILIRUB CONJ SERPL-MCNC: < 0.2 MG/DL (ref 0–0.3)
BILIRUB SERPL-MCNC: < 0.2 MG/DL (ref 0.3–1.2)
BUN SERPL-MCNC: 11 MG/DL (ref 7–22)
CALCIUM SERPL-MCNC: 9.2 MG/DL (ref 8.5–10.5)
CHLORIDE SERPL-SCNC: 104 MEQ/L (ref 98–111)
CO2 SERPL-SCNC: 26 MEQ/L (ref 23–33)
CREAT SERPL-MCNC: 0.5 MG/DL (ref 0.4–1.2)
DEPRECATED RDW RBC AUTO: 50.2 FL (ref 35–45)
EOSINOPHIL NFR BLD AUTO: 3.6 %
EOSINOPHILS ABSOLUTE: 0.3 THOU/MM3 (ref 0–0.4)
ERYTHROCYTE [DISTWIDTH] IN BLOOD BY AUTOMATED COUNT: 14.7 % (ref 11.5–14.5)
GFR SERPL CREATININE-BSD FRML MDRD: > 60 ML/MIN/1.73M2
GLUCOSE SERPL-MCNC: 94 MG/DL (ref 70–108)
HCT VFR BLD AUTO: 26 % (ref 42–52)
HGB BLD-MCNC: 7.9 GM/DL (ref 14–18)
IMM GRANULOCYTES # BLD AUTO: 0.06 THOU/MM3 (ref 0–0.07)
IMM GRANULOCYTES NFR BLD AUTO: 0.6 %
LYMPHOCYTES ABSOLUTE: 2 THOU/MM3 (ref 1–4.8)
LYMPHOCYTES NFR BLD AUTO: 21.1 %
MCH RBC QN AUTO: 28.3 PG (ref 26–33)
MCHC RBC AUTO-ENTMCNC: 30.4 GM/DL (ref 32.2–35.5)
MCV RBC AUTO: 93.2 FL (ref 80–94)
MONOCYTES ABSOLUTE: 0.9 THOU/MM3 (ref 0.4–1.3)
MONOCYTES NFR BLD AUTO: 9.2 %
NEUTROPHILS NFR BLD AUTO: 64.8 %
NRBC BLD AUTO-RTO: 0 /100 WBC
PLATELET # BLD AUTO: 512 THOU/MM3 (ref 130–400)
PMV BLD AUTO: 8.4 FL (ref 9.4–12.4)
POTASSIUM SERPL-SCNC: 4.2 MEQ/L (ref 3.5–5.2)
PROT SERPL-MCNC: 6.8 G/DL (ref 6.1–8)
RBC # BLD AUTO: 2.79 MILL/MM3 (ref 4.7–6.1)
SEGMENTED NEUTROPHILS ABSOLUTE COUNT: 6.1 THOU/MM3 (ref 1.8–7.7)
SODIUM SERPL-SCNC: 137 MEQ/L (ref 135–145)
WBC # BLD AUTO: 9.4 THOU/MM3 (ref 4.8–10.8)

## 2023-11-08 LAB
BASOPHILS ABSOLUTE: 0.1 THOU/MM3 (ref 0–0.1)
BASOPHILS NFR BLD AUTO: 0.6 %
DEPRECATED RDW RBC AUTO: 54.7 FL (ref 35–45)
EOSINOPHIL NFR BLD AUTO: 2.4 %
EOSINOPHILS ABSOLUTE: 0.2 THOU/MM3 (ref 0–0.4)
ERYTHROCYTE [DISTWIDTH] IN BLOOD BY AUTOMATED COUNT: 15.2 % (ref 11.5–14.5)
HCT VFR BLD AUTO: 29.3 % (ref 42–52)
HGB BLD-MCNC: 8.5 GM/DL (ref 14–18)
IMM GRANULOCYTES # BLD AUTO: 0.05 THOU/MM3 (ref 0–0.07)
IMM GRANULOCYTES NFR BLD AUTO: 0.5 %
LYMPHOCYTES ABSOLUTE: 2.2 THOU/MM3 (ref 1–4.8)
LYMPHOCYTES NFR BLD AUTO: 22.9 %
MCH RBC QN AUTO: 29.1 PG (ref 26–33)
MCHC RBC AUTO-ENTMCNC: 29 GM/DL (ref 32.2–35.5)
MCV RBC AUTO: 100.3 FL (ref 80–94)
MONOCYTES ABSOLUTE: 0.8 THOU/MM3 (ref 0.4–1.3)
MONOCYTES NFR BLD AUTO: 8.7 %
NEUTROPHILS NFR BLD AUTO: 64.9 %
NRBC BLD AUTO-RTO: 0 /100 WBC
PLATELET # BLD AUTO: 453 THOU/MM3 (ref 130–400)
PMV BLD AUTO: 8.3 FL (ref 9.4–12.4)
RBC # BLD AUTO: 2.92 MILL/MM3 (ref 4.7–6.1)
SEGMENTED NEUTROPHILS ABSOLUTE COUNT: 6.2 THOU/MM3 (ref 1.8–7.7)
WBC # BLD AUTO: 9.6 THOU/MM3 (ref 4.8–10.8)

## 2023-11-09 LAB
ANION GAP SERPL CALC-SCNC: 9 MEQ/L (ref 8–16)
BASOPHILS ABSOLUTE: 0.1 THOU/MM3 (ref 0–0.1)
BASOPHILS NFR BLD AUTO: 0.6 %
BUN SERPL-MCNC: 16 MG/DL (ref 7–22)
CALCIUM SERPL-MCNC: 9.6 MG/DL (ref 8.5–10.5)
CHLORIDE SERPL-SCNC: 102 MEQ/L (ref 98–111)
CO2 SERPL-SCNC: 25 MEQ/L (ref 23–33)
CREAT SERPL-MCNC: 0.5 MG/DL (ref 0.4–1.2)
DEPRECATED RDW RBC AUTO: 49.6 FL (ref 35–45)
EOSINOPHIL NFR BLD AUTO: 2.5 %
EOSINOPHILS ABSOLUTE: 0.4 THOU/MM3 (ref 0–0.4)
ERYTHROCYTE [DISTWIDTH] IN BLOOD BY AUTOMATED COUNT: 15.1 % (ref 11.5–14.5)
GFR SERPL CREATININE-BSD FRML MDRD: > 60 ML/MIN/1.73M2
GLUCOSE SERPL-MCNC: 91 MG/DL (ref 70–108)
HCT VFR BLD AUTO: 27.2 % (ref 42–52)
HGB BLD-MCNC: 8.5 GM/DL (ref 14–18)
IMM GRANULOCYTES # BLD AUTO: 0.1 THOU/MM3 (ref 0–0.07)
IMM GRANULOCYTES NFR BLD AUTO: 0.7 %
LYMPHOCYTES ABSOLUTE: 2.4 THOU/MM3 (ref 1–4.8)
LYMPHOCYTES NFR BLD AUTO: 16.8 %
MCH RBC QN AUTO: 28.7 PG (ref 26–33)
MCHC RBC AUTO-ENTMCNC: 31.3 GM/DL (ref 32.2–35.5)
MCV RBC AUTO: 91.9 FL (ref 80–94)
MONOCYTES ABSOLUTE: 1 THOU/MM3 (ref 0.4–1.3)
MONOCYTES NFR BLD AUTO: 6.8 %
NEUTROPHILS NFR BLD AUTO: 72.6 %
NRBC BLD AUTO-RTO: 0 /100 WBC
PLATELET # BLD AUTO: 550 THOU/MM3 (ref 130–400)
PMV BLD AUTO: 8.5 FL (ref 9.4–12.4)
POTASSIUM SERPL-SCNC: 4.2 MEQ/L (ref 3.5–5.2)
RBC # BLD AUTO: 2.96 MILL/MM3 (ref 4.7–6.1)
SEGMENTED NEUTROPHILS ABSOLUTE COUNT: 10.5 THOU/MM3 (ref 1.8–7.7)
SODIUM SERPL-SCNC: 136 MEQ/L (ref 135–145)
WBC # BLD AUTO: 14.4 THOU/MM3 (ref 4.8–10.8)

## 2023-11-13 LAB
ALBUMIN SERPL BCG-MCNC: 3.3 G/DL (ref 3.5–5.1)
ALP SERPL-CCNC: 297 U/L (ref 38–126)
ALT SERPL W/O P-5'-P-CCNC: 45 U/L (ref 11–66)
ANION GAP SERPL CALC-SCNC: 14 MEQ/L (ref 8–16)
AST SERPL-CCNC: 31 U/L (ref 5–40)
BASOPHILS ABSOLUTE: 0.1 THOU/MM3 (ref 0–0.1)
BASOPHILS NFR BLD AUTO: 0.6 %
BILIRUB CONJ SERPL-MCNC: < 0.2 MG/DL (ref 0–0.3)
BILIRUB SERPL-MCNC: 0.2 MG/DL (ref 0.3–1.2)
BUN SERPL-MCNC: 12 MG/DL (ref 7–22)
CALCIUM SERPL-MCNC: 9.7 MG/DL (ref 8.5–10.5)
CHLORIDE SERPL-SCNC: 103 MEQ/L (ref 98–111)
CO2 SERPL-SCNC: 23 MEQ/L (ref 23–33)
CREAT SERPL-MCNC: 0.5 MG/DL (ref 0.4–1.2)
DEPRECATED RDW RBC AUTO: 51.9 FL (ref 35–45)
EOSINOPHIL NFR BLD AUTO: 4 %
EOSINOPHILS ABSOLUTE: 0.4 THOU/MM3 (ref 0–0.4)
ERYTHROCYTE [DISTWIDTH] IN BLOOD BY AUTOMATED COUNT: 16.1 % (ref 11.5–14.5)
GFR SERPL CREATININE-BSD FRML MDRD: > 60 ML/MIN/1.73M2
GLUCOSE SERPL-MCNC: 102 MG/DL (ref 70–108)
HCT VFR BLD AUTO: 25.1 % (ref 42–52)
HGB BLD-MCNC: 7.8 GM/DL (ref 14–18)
IMM GRANULOCYTES # BLD AUTO: 0.05 THOU/MM3 (ref 0–0.07)
IMM GRANULOCYTES NFR BLD AUTO: 0.5 %
LYMPHOCYTES ABSOLUTE: 2.1 THOU/MM3 (ref 1–4.8)
LYMPHOCYTES NFR BLD AUTO: 21.9 %
MCH RBC QN AUTO: 28.9 PG (ref 26–33)
MCHC RBC AUTO-ENTMCNC: 31.1 GM/DL (ref 32.2–35.5)
MCV RBC AUTO: 93 FL (ref 80–94)
MONOCYTES ABSOLUTE: 0.9 THOU/MM3 (ref 0.4–1.3)
MONOCYTES NFR BLD AUTO: 9.8 %
NEUTROPHILS NFR BLD AUTO: 63.2 %
NRBC BLD AUTO-RTO: 0 /100 WBC
PLATELET # BLD AUTO: 474 THOU/MM3 (ref 130–400)
PMV BLD AUTO: 8.3 FL (ref 9.4–12.4)
POTASSIUM SERPL-SCNC: 4.1 MEQ/L (ref 3.5–5.2)
PREALB SERPL-MCNC: 20.4 MG/DL (ref 20–40)
PROT SERPL-MCNC: 7.5 G/DL (ref 6.1–8)
RBC # BLD AUTO: 2.7 MILL/MM3 (ref 4.7–6.1)
SEGMENTED NEUTROPHILS ABSOLUTE COUNT: 5.9 THOU/MM3 (ref 1.8–7.7)
SODIUM SERPL-SCNC: 140 MEQ/L (ref 135–145)
WBC # BLD AUTO: 9.4 THOU/MM3 (ref 4.8–10.8)

## 2023-11-16 PROBLEM — S20.311A ABRASION OF RIGHT CHEST WALL: Status: RESOLVED | Noted: 2017-05-06 | Resolved: 2023-11-16

## 2023-11-16 PROBLEM — S70.211A ABRASION OF RIGHT HIP: Status: RESOLVED | Noted: 2017-05-06 | Resolved: 2023-11-16

## 2023-11-16 PROBLEM — S80.211A ABRASION OF RIGHT KNEE: Status: RESOLVED | Noted: 2017-05-06 | Resolved: 2023-11-16

## 2023-11-29 ENCOUNTER — TELEPHONE (OUTPATIENT)
Dept: SURGERY | Age: 43
End: 2023-11-29

## 2023-11-29 NOTE — TELEPHONE ENCOUNTER
Carmenza Wilkinson, wound care nurse from [facility] called stating pt is wanting to know if he can have the G Tube removed. She stated they only flush it 2x daily and he doesn't use it. She stated pt c/o that it causes him some pain some times.  Pt has appt to be seen

## 2024-02-03 ENCOUNTER — CLINICAL DOCUMENTATION (OUTPATIENT)
Dept: ONCOLOGY | Age: 44
End: 2024-02-03
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
## 2024-03-13 ENCOUNTER — HOSPITAL ENCOUNTER (OUTPATIENT)
Dept: WOUND CARE | Age: 44
Discharge: HOME | End: 2024-03-13
Attending: NURSE PRACTITIONER
Payer: MEDICAID

## 2024-03-13 ENCOUNTER — TELEPHONE (OUTPATIENT)
Dept: FAMILY MEDICINE CLINIC | Age: 44
End: 2024-03-13

## 2024-03-13 VITALS
SYSTOLIC BLOOD PRESSURE: 128 MMHG | DIASTOLIC BLOOD PRESSURE: 74 MMHG | WEIGHT: 175 LBS | OXYGEN SATURATION: 99 % | HEART RATE: 90 BPM | HEIGHT: 66 IN | BODY MASS INDEX: 28.12 KG/M2 | TEMPERATURE: 96.7 F | RESPIRATION RATE: 16 BRPM

## 2024-03-13 DIAGNOSIS — T81.89XA NON-HEALING SURGICAL WOUND, INITIAL ENCOUNTER: Primary | ICD-10-CM

## 2024-03-13 PROCEDURE — 99214 OFFICE O/P EST MOD 30 MIN: CPT

## 2024-03-13 PROCEDURE — 99214 OFFICE O/P EST MOD 30 MIN: CPT | Performed by: NURSE PRACTITIONER

## 2024-03-13 RX ORDER — SODIUM CHLOR/HYPOCHLOROUS ACID 0.033 %
SOLUTION, IRRIGATION IRRIGATION ONCE
Status: CANCELLED | OUTPATIENT
Start: 2024-03-13 | End: 2024-03-13

## 2024-03-13 RX ORDER — BETAMETHASONE DIPROPIONATE 0.5 MG/G
CREAM TOPICAL ONCE
Status: CANCELLED | OUTPATIENT
Start: 2024-03-13 | End: 2024-03-13

## 2024-03-13 RX ORDER — TRIAMCINOLONE ACETONIDE 1 MG/G
OINTMENT TOPICAL ONCE
OUTPATIENT
Start: 2024-03-13 | End: 2024-03-13

## 2024-03-13 RX ORDER — LIDOCAINE HYDROCHLORIDE 40 MG/ML
SOLUTION TOPICAL ONCE
OUTPATIENT
Start: 2024-03-13 | End: 2024-03-13

## 2024-03-13 RX ORDER — LIDOCAINE 50 MG/G
OINTMENT TOPICAL ONCE
OUTPATIENT
Start: 2024-03-13 | End: 2024-03-13

## 2024-03-13 RX ORDER — IBUPROFEN 200 MG
TABLET ORAL ONCE
Status: CANCELLED | OUTPATIENT
Start: 2024-03-13 | End: 2024-03-13

## 2024-03-13 RX ORDER — GINSENG 100 MG
CAPSULE ORAL ONCE
Status: CANCELLED | OUTPATIENT
Start: 2024-03-13 | End: 2024-03-13

## 2024-03-13 RX ORDER — CLOBETASOL PROPIONATE 0.5 MG/G
OINTMENT TOPICAL ONCE
OUTPATIENT
Start: 2024-03-13 | End: 2024-03-13

## 2024-03-13 RX ORDER — GENTAMICIN SULFATE 1 MG/G
OINTMENT TOPICAL ONCE
Status: CANCELLED | OUTPATIENT
Start: 2024-03-13 | End: 2024-03-13

## 2024-03-13 RX ORDER — IBUPROFEN 200 MG
TABLET ORAL ONCE
OUTPATIENT
Start: 2024-03-13 | End: 2024-03-13

## 2024-03-13 RX ORDER — GINSENG 100 MG
CAPSULE ORAL ONCE
OUTPATIENT
Start: 2024-03-13 | End: 2024-03-13

## 2024-03-13 RX ORDER — LIDOCAINE HYDROCHLORIDE 20 MG/ML
JELLY TOPICAL ONCE
Status: CANCELLED | OUTPATIENT
Start: 2024-03-13 | End: 2024-03-13

## 2024-03-13 RX ORDER — LIDOCAINE HYDROCHLORIDE 20 MG/ML
JELLY TOPICAL ONCE
OUTPATIENT
Start: 2024-03-13 | End: 2024-03-13

## 2024-03-13 RX ORDER — LIDOCAINE 40 MG/G
CREAM TOPICAL ONCE
Status: CANCELLED | OUTPATIENT
Start: 2024-03-13 | End: 2024-03-13

## 2024-03-13 RX ORDER — BACITRACIN ZINC AND POLYMYXIN B SULFATE 500; 1000 [USP'U]/G; [USP'U]/G
OINTMENT TOPICAL ONCE
OUTPATIENT
Start: 2024-03-13 | End: 2024-03-13

## 2024-03-13 RX ORDER — BACITRACIN ZINC AND POLYMYXIN B SULFATE 500; 1000 [USP'U]/G; [USP'U]/G
OINTMENT TOPICAL ONCE
Status: CANCELLED | OUTPATIENT
Start: 2024-03-13 | End: 2024-03-13

## 2024-03-13 RX ORDER — CLOBETASOL PROPIONATE 0.5 MG/G
OINTMENT TOPICAL ONCE
Status: CANCELLED | OUTPATIENT
Start: 2024-03-13 | End: 2024-03-13

## 2024-03-13 RX ORDER — LIDOCAINE HYDROCHLORIDE 40 MG/ML
SOLUTION TOPICAL ONCE
Status: CANCELLED | OUTPATIENT
Start: 2024-03-13 | End: 2024-03-13

## 2024-03-13 RX ORDER — GENTAMICIN SULFATE 1 MG/G
OINTMENT TOPICAL ONCE
OUTPATIENT
Start: 2024-03-13 | End: 2024-03-13

## 2024-03-13 RX ORDER — SODIUM CHLOR/HYPOCHLOROUS ACID 0.033 %
SOLUTION, IRRIGATION IRRIGATION ONCE
OUTPATIENT
Start: 2024-03-13 | End: 2024-03-13

## 2024-03-13 RX ORDER — BETAMETHASONE DIPROPIONATE 0.5 MG/G
CREAM TOPICAL ONCE
OUTPATIENT
Start: 2024-03-13 | End: 2024-03-13

## 2024-03-13 RX ORDER — LIDOCAINE 50 MG/G
OINTMENT TOPICAL ONCE
Status: CANCELLED | OUTPATIENT
Start: 2024-03-13 | End: 2024-03-13

## 2024-03-13 RX ORDER — LIDOCAINE 40 MG/G
CREAM TOPICAL ONCE
OUTPATIENT
Start: 2024-03-13 | End: 2024-03-13

## 2024-03-13 RX ORDER — TRIAMCINOLONE ACETONIDE 1 MG/G
OINTMENT TOPICAL ONCE
Status: CANCELLED | OUTPATIENT
Start: 2024-03-13 | End: 2024-03-13

## 2024-03-13 NOTE — TELEPHONE ENCOUNTER
----- Message from Betina Kothari sent at 3/12/2024  2:41 PM EDT -----  Subject: Message to Provider    QUESTIONS  Information for Provider? Nancy from [facility] needs an order   signed off on for this patient for his ostomy supplies. She has been   trying to reach office for a week. Her number is [phone]. She faxed a   order last week and will do again today  ---------------------------------------------------------------------------  --------------  CALL BACK INFO  [phone]; OK to leave message on voicemail  ---------------------------------------------------------------------------  --------------  SCRIPT ANSWERS  Relationship to Patient? Covered Entity  Covered Entity Type? Health Insurance?  Representative Name? nancy

## 2024-03-13 NOTE — PLAN OF CARE
Problem: Wound:  Goal: Will show signs of wound healing; wound closure and no evidence of infection  Description: Will show signs of wound healing; wound closure and no evidence of infection  Outcome: Progressing   Patient presents to wound clinic for evaluation and treatment of left buttock wound. Discharge instructions/dressing orders per AVS. Follow up appt scheduled in 3 weeks.    Care plan reviewed with patient.  Patient verbalize understanding of the plan of care and contribute to goal setting.

## 2024-03-13 NOTE — PATIENT INSTRUCTIONS
next scheduled appointment. Please give 24 hour notice if unable to keep appointment. [phone]    If you experience any of the following, please call the Wound Care Service during business hours: Monday through Friday 8:00 am - 4:30 pm  ([phone]).   *Increase in pain   *Temperature over 101   *Increase in drainage from your wound or a foul odor   *Uncontrolled swelling   *Need for compression bandage changes due to slippage, breakthrough drainage    If you need medical attention outside of business hours, please contact your Primary Care Doctor or go to the nearest emergency room.

## 2024-03-14 ENCOUNTER — TELEPHONE (OUTPATIENT)
Dept: WOUND CARE | Age: 44
End: 2024-03-14

## 2024-03-14 NOTE — PROGRESS NOTES
Wound Care Supplies      Supply Company:     [employer] [address] [place] [address] f: [phone] f: [phone] p: [phone] [email or web address]      Ordering Center:   [facility]  [address] [address]  [phone]  WOUND CARE Dept: [phone]   FAX NUMBER [phone]    Patient Information:      Anirudh Segal  [address] [facility] [address]   [phone]   : 1980  AGE: 43 y.o.     GENDER: male   EPISODE DATE: 3/13/2024    Insurance:      PRIMARY INSURANCE:  Plan: MEDICAID [facility] DEPT OF JOB  Coverage: MEDICAID [place]  Effective Date: 2024  Group Number: [unfilled]  Subscriber Number: [id number] - (Medicaid)    Payer/Plan Subscr  Sex Relation Sub. Ins. ID Effective Group Num   1. MEDICAID [place] -* ANIRUDH SEGAL* 1980 Male Self [id number]                                    [address]       Patient Wound Information:      Problem List Items Addressed This Visit          Other    Nonhealing surgical wound - Primary    Relevant Orders    Initiate Outpatient Wound Care Protocol       WOUNDS REQUIRING DRESSING SUPPLIES:     Wound 10/19/23 Mouth Right (Active)   Number of days: 145       Wound 24 Buttocks Left (Active)   Wound Image   24 1451   Wound Etiology Non-Healing Surgical 24 1451   Dressing Status Intact;New dressing applied 24 1451   Wound Cleansed Cleansed with saline 24 1451   Dressing/Treatment Triad hydro/zinc oxide-based hydrophilic paste;Foam;Silicone border 24 1451   Offloading for Diabetic Foot Ulcers Offloading not required 24 1451   Wound Length (cm) 13 cm 24 1451   Wound Width (cm) 4.9 cm 24 1451   Wound Depth (cm) 0.1 cm 24 1451   Wound Surface Area (cm^2) 63.7 cm^2 24 1451   Wound Volume (cm^3) 6.37 cm^3 24 1451   Wound Assessment Granulation tissue;Slough;Pale granulation tissue 24 1451   Drainage Amount Moderate 
[facility]  [address]   [place], [place] [address]  Telephone: [phone]     FAX [phone]    Home health agencies: [facility] Phone # [phone], Fax # [phone]        Patient Instructions   Visit Discharge/Physician Orders:  - Make sure you are getting good nutrition and plenty of protein in your diet. Continue the protein drinks.  - Try to keep pressure off of your wound. Try and reposition frequently, every 1-2 hours while awake.   - Continue offloading cushion in wheelchair and any chair you sit in.   - Try to cut back/quit smoking to help with wound healing.   - Dressing supplies ordered through [employer] and will be delivered to your home, call if you do not receive within the next 2-3 days.   - Discontinue Dakin's solution.    Home Care: Call and let us know which Home Health you are using and we will fax orders to them    Wound Location: Left buttock    Dressing orders:     1) Gather wound care supplies and arrange on clean table.     2) Wash your hands with soap and water or use alcohol based hand  for 20 seconds (sing \"Happy Birthday\" twice).    3) Cleanse wounds with normal saline or wound cleanser and gauze. Pat dry with clean gauze.    4) Left buttock- Apply triad cream to wound. Apply skin prep to skin around the wound to protect it. Cover with a silicone bordered foam dressing. Change 3 times weekly.    Keep all dressings clean & dry.    Follow up visit:   3 Weeks - 4/3/24 at 1:30 pm    Supplies:  - Mechanical Debridement was completed today by using a saline and gauze.     Duration of dressings: 30 days    We have sent your supply order to the following company:  [employer] Phone # [phone]   If you don't receive the items you were expecting or don't know what the items are that you received, call the company where the order was sent.   If you are unable to obtain wound supplies, continue to use the supplies you have 
Debridement was completed today by using a saline and gauze.     Duration of dressings: 30 days    We have sent your supply order to the following company:  [employer] Phone # [phone]   If you don't receive the items you were expecting or don't know what the items are that you received, call the company where the order was sent.   If you are unable to obtain wound supplies, continue to use the supplies you have available until you are able to reach us. It is most important to keep the wound covered at all times.  It is YOUR responsibility to make sure that supplies are re-ordered before you run out. Re-order telephone numbers are included in each package.     Keep next scheduled appointment. Please give 24 hour notice if unable to keep appointment. [phone]    If you experience any of the following, please call the Wound Care Service during business hours: Monday through Friday 8:00 am - 4:30 pm  ([phone]).   *Increase in pain   *Temperature over 101   *Increase in drainage from your wound or a foul odor   *Uncontrolled swelling   *Need for compression bandage changes due to slippage, breakthrough drainage    If you need medical attention outside of business hours, please contact your Primary Care Doctor or go to the nearest emergency room.                Electronically signed by FRANCE Gil CNP on 3/13/2024 at 3:22 PM

## 2024-03-14 NOTE — TELEPHONE ENCOUNTER
Received call from patient of what home health agency he is using, patient instructions sent to [facility] DAMON

## 2024-03-19 ENCOUNTER — TELEPHONE (OUTPATIENT)
Dept: FAMILY MEDICINE CLINIC | Age: 44
End: 2024-03-19

## 2024-03-19 NOTE — TELEPHONE ENCOUNTER
----- Message from Betina Kothari sent at 3/19/2024 11:26 AM EDT -----  Subject: Hospital Follow Up    QUESTIONS  What hospital was the Patient Discharged from? [facility]  Date of Discharge? 2023-11-05  Discharge Location? Nursing Facility  Reason for hospitalization as patient stated? wound on buttock  What question does the patient have, if applicable? According to patient,   he was discharged from [facility] the first of the month. He needs a   catheter taken out. Please call to schedule, unable to get through to   office  ---------------------------------------------------------------------------  --------------  CALL BACK INFO  What is the best way for the office to contact you? OK to leave message on   voicemail  Preferred Call Back Phone Number? [phone]  ---------------------------------------------------------------------------  --------------  SCRIPT ANSWERS  Relationship to Patient? Self

## 2024-03-25 ENCOUNTER — OFFICE VISIT (OUTPATIENT)
Dept: FAMILY MEDICINE CLINIC | Age: 44
End: 2024-03-25
Payer: MEDICAID

## 2024-03-25 VITALS
OXYGEN SATURATION: 98 % | BODY MASS INDEX: 27.23 KG/M2 | DIASTOLIC BLOOD PRESSURE: 88 MMHG | TEMPERATURE: 98.2 F | SYSTOLIC BLOOD PRESSURE: 138 MMHG | RESPIRATION RATE: 20 BRPM | HEART RATE: 100 BPM | HEIGHT: 66 IN | WEIGHT: 169.4 LBS

## 2024-03-25 DIAGNOSIS — N31.9 NEUROGENIC BLADDER: Primary | ICD-10-CM

## 2024-03-25 PROCEDURE — 99214 OFFICE O/P EST MOD 30 MIN: CPT

## 2024-03-25 ASSESSMENT — PATIENT HEALTH QUESTIONNAIRE - PHQ9
2. FEELING DOWN, DEPRESSED OR HOPELESS: NOT AT ALL
1. LITTLE INTEREST OR PLEASURE IN DOING THINGS: NOT AT ALL
SUM OF ALL RESPONSES TO PHQ QUESTIONS 1-9: 0
SUM OF ALL RESPONSES TO PHQ9 QUESTIONS 1 & 2: 0

## 2024-03-25 NOTE — PROGRESS NOTES
S: 43 y.o. male with   Chief Complaint   Patient presents with    New To Stew     Discuss catheter, removing.   HH coming Wednesday or Thursday this week.        New patient  10/23 ED visit for AMS- had a basal ganglia hemorrhagic stroke with admission to ICU. Was intubated for severe alcohol withdrawal.  Had a perirectal abscess with sepsis which required surgical intervention, diverting colostomy, required placement of a white catheter.  Had dysphagia post extubation and required G tube placement.  Transitional care at [facility] and then placement at [facility] since then.  Has been seen by NP Latha Altamirano and Dr. Hernandez since then.  Had removal of G tube by Dr. Gallagher in December and tolerating po since then.  Has been discharged to home on March 1st and is having HH care.    HH nurse requesting white removal. Had removal of the white in NH for a period of time and then has had it replaced due to his wound healing.      Reviewed hx and ROS in detail with resident prior to exam.  See notes for additional details.     BP Readings from Last 3 Encounters:   03/25/24 138/88   03/13/24 128/74   02/29/24 (!) 116/53     Wt Readings from Last 3 Encounters:   03/25/24 76.8 kg (169 lb 6.4 oz)   03/13/24 79.4 kg (175 lb)   02/29/24 77.1 kg (170 lb)           O: VS:  height is 1.676 m (5' 6\") and weight is 76.8 kg (169 lb 6.4 oz). His oral temperature is 98.2 °F (36.8 °C). His blood pressure is 138/88 and his pulse is 100. His respiration is 20 and oxygen saturation is 98%.   AAO/NAD, appropriate affect for mood  R hemiparesis     Diagnosis Orders   1. Right hemiparesis (HCC)        2. Neurogenic bladder            Plan    Recommend Urology evaluation prior to trial of removal of white given length of use (since October 2023) and hx of R hemiparesis, recent CVA.  Will need voiding trial and urodynamics.      Continue  services for wound care and therapy, nursing services.  Is wheelchair bound and limited
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
## 2024-03-26 ENCOUNTER — OFFICE VISIT (OUTPATIENT)
Dept: UROLOGY | Age: 44
End: 2024-03-26
Payer: MEDICAID

## 2024-03-26 VITALS — WEIGHT: 169 LBS | HEIGHT: 66 IN | RESPIRATION RATE: 14 BRPM | BODY MASS INDEX: 27.16 KG/M2

## 2024-03-26 DIAGNOSIS — N31.9 NEUROGENIC BLADDER: Primary | ICD-10-CM

## 2024-03-26 PROCEDURE — 99204 OFFICE O/P NEW MOD 45 MIN: CPT | Performed by: UROLOGY

## 2024-03-26 RX ORDER — IBUPROFEN 800 MG/1
TABLET ORAL
COMMUNITY
Start: 2024-01-05

## 2024-03-26 RX ORDER — DOXEPIN HYDROCHLORIDE 10 MG/1
CAPSULE ORAL
COMMUNITY

## 2024-03-26 RX ORDER — ERGOCALCIFEROL 1.25 MG/1
CAPSULE ORAL
COMMUNITY
Start: 2024-03-14

## 2024-03-26 RX ORDER — HYDROCODONE BITARTRATE AND ACETAMINOPHEN 5; 325 MG/1; MG/1
TABLET ORAL
COMMUNITY
Start: 2024-03-01

## 2024-03-26 RX ORDER — CLONIDINE 0.2 MG/24H
PATCH, EXTENDED RELEASE TRANSDERMAL
COMMUNITY
Start: 2024-01-25

## 2024-03-26 RX ORDER — POLYETHYLENE GLYCOL 3350 17 G/17G
POWDER, FOR SOLUTION ORAL
COMMUNITY
Start: 2024-03-09

## 2024-03-26 RX ORDER — DOXYCYCLINE HYCLATE 100 MG/1
CAPSULE ORAL
COMMUNITY

## 2024-03-26 RX ORDER — HYDROCODONE BITARTRATE AND ACETAMINOPHEN 10; 325 MG/1; MG/1
TABLET ORAL
COMMUNITY
Start: 2024-03-12

## 2024-03-26 RX ORDER — MAGNESIUM HYDROXIDE 1200 MG/15ML
LIQUID ORAL
COMMUNITY
Start: 2023-12-26

## 2024-03-26 RX ORDER — ACETIC ACID 0.25 G/100ML
IRRIGANT IRRIGATION
COMMUNITY
Start: 2023-11-18

## 2024-03-26 RX ORDER — PSYLLIUM HUSK 3.4 G/7G
1 POWDER ORAL NIGHTLY
COMMUNITY
Start: 2024-03-02

## 2024-03-26 RX ORDER — CIPROFLOXACIN 250 MG/1
TABLET, FILM COATED ORAL
COMMUNITY

## 2024-03-26 RX ORDER — LANOLIN ALCOHOL/MO/W.PET/CERES
100 CREAM (GRAM) TOPICAL DAILY
COMMUNITY
Start: 2024-03-02

## 2024-03-26 RX ORDER — CIPROFLOXACIN 500 MG/1
TABLET, FILM COATED ORAL
COMMUNITY

## 2024-03-26 RX ORDER — HYDROXYZINE HYDROCHLORIDE 25 MG/1
1 TABLET, FILM COATED ORAL NIGHTLY
COMMUNITY
Start: 2024-02-07

## 2024-03-26 NOTE — PROGRESS NOTES
Patient has given me verbal consent to perform white removal  Yes    DIAGNOSIS/INDICATION:  neurogenic bladder    Per Dr Guillermo  10 cc of water deflated from white balloon. 16 Fr straight white removed without difficulty.    Foreskin reduced back down?  Yes    Pt will drink fluids and report to ER in 6-8 hours if patient unable to urinate.      F/u with provider tomorrow for PVR.  
    doxepin (SINEQUAN) 10 MG capsule       doxycycline hyclate (VIBRAMYCIN) 100 MG capsule       vitamin D (ERGOCALCIFEROL) 1.25 MG (00345 UT) CAPS capsule take 1 capsule EVERY WEEK orally for 30 DAYS      HYDROcodone-acetaminophen (NORCO)  MG per tablet take 1 tablet by mouth every 8 hours if needed      HYDROcodone-acetaminophen (NORCO) 5-325 MG per tablet       hydrOXYzine HCl (ATARAX) 25 MG tablet Take 1 tablet by mouth nightly      ibuprofen (ADVIL;MOTRIN) 800 MG tablet take 1 tablet by mouth once daily if needed for pain      RA MELATONIN 5 MG TABS tablet Take 1 tablet by mouth nightly at bedtime.      polyethylene glycol (GLYCOLAX) 17 GM/SCOOP powder take 17GM (DISSOLVED IN WATER) by mouth once daily if needed for constipation      thiamine 100 MG tablet Take 1 tablet by mouth daily      Water For Irrigation, Sterile (STERILE WATER FOR IRRIGATION)       acetaminophen (TYLENOL) 325 MG tablet Take 2 tablets by mouth every 6 hours as needed for Pain      amLODIPine (NORVASC) 10 MG tablet 1 tablet by Per G Tube route daily 30 tablet 0    doxazosin (CARDURA) 1 MG tablet 1 tablet by Per G Tube route at bedtime 30 tablet 3    ferrous sulfate (IRON 325) 325 (65 Fe) MG tablet 1 tablet by Per G Tube route every other day 30 tablet 0    folic acid (FOLVITE) 1 MG tablet 1 tablet by Per NG tube route daily 30 tablet 3    hydroCHLOROthiazide (HYDRODIURIL) 25 MG tablet 1 tablet by Per G Tube route daily 30 tablet 3    levETIRAcetam (KEPPRA) 100 MG/ML solution 5 mLs by Per G Tube route 2 times daily (Patient taking differently: Take 5 mLs by mouth 2 times daily)  3    levothyroxine (SYNTHROID) 50 MCG tablet 1 tablet by Per G Tube route Daily (Patient taking differently: Take 1 tablet by mouth Daily) 30 tablet 0    Multiple Vitamin (MULTIVITAMIN) TABS tablet 1 tablet by Per G Tube route daily (Patient taking differently: Take 1 tablet by mouth daily)  0    sodium hypochlorite (DAKINS) 0.125 % SOLN external solution

## 2024-03-27 ENCOUNTER — NURSE ONLY (OUTPATIENT)
Dept: UROLOGY | Age: 44
End: 2024-03-27
Payer: MEDICAID

## 2024-03-27 DIAGNOSIS — N31.9 NEUROGENIC BLADDER: Primary | ICD-10-CM

## 2024-03-27 LAB — POST VOID RESIDUAL (PVR): 25 ML

## 2024-03-27 PROCEDURE — 51798 US URINE CAPACITY MEASURE: CPT | Performed by: NURSE PRACTITIONER

## 2024-04-03 ENCOUNTER — HOSPITAL ENCOUNTER (OUTPATIENT)
Dept: WOUND CARE | Age: 44
Discharge: HOME | End: 2024-04-03
Attending: NURSE PRACTITIONER
Payer: MEDICAID

## 2024-04-03 ENCOUNTER — TELEPHONE (OUTPATIENT)
Dept: WOUND CARE | Age: 44
End: 2024-04-03

## 2024-04-03 VITALS
HEART RATE: 98 BPM | OXYGEN SATURATION: 96 % | SYSTOLIC BLOOD PRESSURE: 125 MMHG | DIASTOLIC BLOOD PRESSURE: 59 MMHG | TEMPERATURE: 98.7 F

## 2024-04-03 DIAGNOSIS — T81.89XA NON-HEALING SURGICAL WOUND, INITIAL ENCOUNTER: Primary | ICD-10-CM

## 2024-04-03 PROCEDURE — 6370000000 HC RX 637 (ALT 250 FOR IP): Performed by: NURSE PRACTITIONER

## 2024-04-03 PROCEDURE — 97597 DBRDMT OPN WND 1ST 20 CM/<: CPT

## 2024-04-03 RX ORDER — LIDOCAINE HYDROCHLORIDE 40 MG/ML
SOLUTION TOPICAL ONCE
OUTPATIENT
Start: 2024-04-03 | End: 2024-04-03

## 2024-04-03 RX ORDER — LIDOCAINE 40 MG/G
CREAM TOPICAL ONCE
OUTPATIENT
Start: 2024-04-03 | End: 2024-04-03

## 2024-04-03 RX ORDER — BETAMETHASONE DIPROPIONATE 0.5 MG/G
CREAM TOPICAL ONCE
OUTPATIENT
Start: 2024-04-03 | End: 2024-04-03

## 2024-04-03 RX ORDER — IBUPROFEN 200 MG
TABLET ORAL ONCE
OUTPATIENT
Start: 2024-04-03 | End: 2024-04-03

## 2024-04-03 RX ORDER — GINSENG 100 MG
CAPSULE ORAL ONCE
OUTPATIENT
Start: 2024-04-03 | End: 2024-04-03

## 2024-04-03 RX ORDER — SODIUM CHLOR/HYPOCHLOROUS ACID 0.033 %
SOLUTION, IRRIGATION IRRIGATION ONCE
OUTPATIENT
Start: 2024-04-03 | End: 2024-04-03

## 2024-04-03 RX ORDER — LIDOCAINE HYDROCHLORIDE 20 MG/ML
JELLY TOPICAL ONCE
Status: COMPLETED | OUTPATIENT
Start: 2024-04-03 | End: 2024-04-03

## 2024-04-03 RX ORDER — GENTAMICIN SULFATE 1 MG/G
OINTMENT TOPICAL ONCE
OUTPATIENT
Start: 2024-04-03 | End: 2024-04-03

## 2024-04-03 RX ORDER — LIDOCAINE HYDROCHLORIDE 20 MG/ML
JELLY TOPICAL ONCE
OUTPATIENT
Start: 2024-04-03 | End: 2024-04-03

## 2024-04-03 RX ORDER — CLOBETASOL PROPIONATE 0.5 MG/G
OINTMENT TOPICAL ONCE
OUTPATIENT
Start: 2024-04-03 | End: 2024-04-03

## 2024-04-03 RX ORDER — BACITRACIN ZINC AND POLYMYXIN B SULFATE 500; 1000 [USP'U]/G; [USP'U]/G
OINTMENT TOPICAL ONCE
OUTPATIENT
Start: 2024-04-03 | End: 2024-04-03

## 2024-04-03 RX ORDER — LIDOCAINE 50 MG/G
OINTMENT TOPICAL ONCE
OUTPATIENT
Start: 2024-04-03 | End: 2024-04-03

## 2024-04-03 RX ORDER — TRIAMCINOLONE ACETONIDE 1 MG/G
OINTMENT TOPICAL ONCE
OUTPATIENT
Start: 2024-04-03 | End: 2024-04-03

## 2024-04-03 RX ADMIN — LIDOCAINE HYDROCHLORIDE: 20 JELLY TOPICAL at 13:39

## 2024-04-03 NOTE — TELEPHONE ENCOUNTER
----- Message from Evi Sawant sent at 4/2/2024  1:42 PM EDT -----   [phone] VICKY AT [facility] ASKS FOR A CALL BACK REGARDING RENETTA

## 2024-04-03 NOTE — PLAN OF CARE
Problem: Wound:  Goal: Will show signs of wound healing; wound closure and no evidence of infection  Description: Will show signs of wound healing; wound closure and no evidence of infection  Outcome: Progressing   Seen today for left buttock wound. See AVS for discharge.   Care plan reviewed with patient.  Patient verbalize understanding of the plan of care and contribute to goal setting.

## 2024-04-03 NOTE — PATIENT INSTRUCTIONS
Visit Discharge/Physician Orders:  - Make sure you are getting good nutrition and plenty of protein in your diet. Continue the protein drinks.  - Try to keep pressure off of your wound. Try and reposition frequently, every 1-2 hours while awake.   - Continue offloading cushion in wheelchair and any chair you sit in.   - Try to cut back/quit smoking to help with wound healing.   - Dressing supplies ordered through [employer] and will be delivered to your home, call if you do not receive within the next 2-3 days.   - Discontinue Dakin's solution.  -debridement done today     Home Care: [facility]     Wound Location: Left buttock    Dressing orders:     1) Gather wound care supplies and arrange on clean table.     2) Wash your hands with soap and water or use alcohol based hand  for 20 seconds (sing \"Happy Birthday\" twice).    3) Cleanse wounds with normal saline or wound cleanser and gauze. Pat dry with clean gauze.    4) Left buttock- Apply triad cream to wound. Apply skin prep to skin around the wound to protect it. Cover with a silicone bordered foam dressing. Change 3 times weekly.    Keep all dressings clean & dry.    Follow up visit:   3 Weeks - Thursday April 25th at 11:00 am     Supplies:  - Mechanical Debridement was completed today by using a saline and gauze.     Duration of dressings: 30 days    We have sent your supply order to the following company:  [employer] Phone # [phone]   If you don't receive the items you were expecting or don't know what the items are that you received, call the company where the order was sent.   If you are unable to obtain wound supplies, continue to use the supplies you have available until you are able to reach us. It is most important to keep the wound covered at all times.  It is YOUR responsibility to make sure that supplies are re-ordered before you run out. Re-order telephone numbers are included in each package.     Keep next scheduled

## 2024-04-03 NOTE — PROGRESS NOTES
Wound Care Supplies      Supply Company:     [employer] [address] [place] [address] f: [phone] f: [phone] p: [phone] [email or web address]      Ordering Center:   [facility]  [address] [place] [address]  [phone]  WOUND CARE Dept: [phone]   FAX NUMBER [phone]    Patient Information:      Anirudh Segal  [facility] [address]   [phone]   : 1980  AGE: 43 y.o.     GENDER: male   EPISODE DATE: 4/3/2024    Insurance:      PRIMARY INSURANCE:  Plan[place] DEPT [place]  Coverage: [place]  Effective Date: 2024  Group Number: [unfilled]  Subscriber Number: [id number] - (Medicaid)    Payer/Plan Subscr  Sex Relation Sub. Ins. ID Effective Group Num   1. [employer]* ANIRUDH SEGAL* 1980 Male Self [id number] 1/1/15                                    [address]   2. [place]ID [place] -* ANIRUDH SEGAL* 1980 Male Self [id number] 24                                    [address]       Patient Wound Information:      Problem List Items Addressed This Visit          Other    Nonhealing surgical wound - Primary    Relevant Orders    Initiate Outpatient Wound Care Protocol       WOUNDS REQUIRING DRESSING SUPPLIES:     Wound 10/19/23 Mouth Right (Active)   Number of days: 166       Wound 24 Buttocks Left (Active)   Wound Image   24 1327   Wound Etiology Non-Healing Surgical 24 1327   Dressing Status Intact;New dressing applied 24 1327   Wound Cleansed Cleansed with saline 24 1327   Dressing/Treatment Triad hydro/zinc oxide-based hydrophilic paste;Foam;Silicone border 24 1451   Offloading for Diabetic Foot Ulcers Offloading not required 24 1327   Wound Length (cm) 6.9 cm 24 1327   Wound Width (cm) 2.7 cm 24 1327   Wound Depth (cm) 0.05 cm 24 1327   Wound Surface Area (cm^2) 18.63 cm^2 24 1327   Change in Wound Size % 
[facility]  [address]   [place], [place] [address]  Telephone: [phone]     FAX [phone]    Home health agencies: [facility] Phone # [phone], Fax # [phone]      Patient Instructions   Visit Discharge/Physician Orders:  - Make sure you are getting good nutrition and plenty of protein in your diet. Continue the protein drinks.  - Try to keep pressure off of your wound. Try and reposition frequently, every 1-2 hours while awake.   - Continue offloading cushion in wheelchair and any chair you sit in.   - Try to cut back/quit smoking to help with wound healing.   - Dressing supplies ordered through [employer] and will be delivered to your home, call if you do not receive within the next 2-3 days.   - Discontinue Dakin's solution.  -debridement done today     Home Care: [facility]     Wound Location: Left buttock    Dressing orders:     1) Gather wound care supplies and arrange on clean table.     2) Wash your hands with soap and water or use alcohol based hand  for 20 seconds (sing \"Happy Birthday\" twice).    3) Cleanse wounds with normal saline or wound cleanser and gauze. Pat dry with clean gauze.    4) Left buttock- Apply triad cream to wound. Apply skin prep to skin around the wound to protect it. Cover with a silicone bordered foam dressing. Change 3 times weekly.    Keep all dressings clean & dry.    Follow up visit:   3 Weeks - Thursday April 25th at 11:00 am     Supplies:  - Mechanical Debridement was completed today by using a saline and gauze.     Duration of dressings: 30 days    We have sent your supply order to the following company:  [employer] Phone # [phone]   If you don't receive the items you were expecting or don't know what the items are that you received, call the company where the order was sent.   If you are unable to obtain wound supplies, continue to use the supplies you have available until you are 
Types: Cigarettes     Start date: 3/1/2024     Passive exposure: Never    Smokeless tobacco: Never   Vaping Use    Vaping Use: Never used   Substance Use Topics    Alcohol use: Not Currently    Drug use: Yes     Frequency: 7.0 times per week     Types: Marijuana (Weed)       ALLERGIES     No Known Allergies    MEDICATIONS     Current Outpatient Medications on File Prior to Encounter   Medication Sig Dispense Refill    acetic acid 0.25 % irrigation       ciprofloxacin (CIPRO) 250 MG tablet       ciprofloxacin (CIPRO) 500 MG tablet       cloNIDine (CATAPRES) 0.2 MG/24HR PTWK apply 1 patch every week ON THURSDAY      doxepin (SINEQUAN) 10 MG capsule       doxycycline hyclate (VIBRAMYCIN) 100 MG capsule       vitamin D (ERGOCALCIFEROL) 1.25 MG (50930 UT) CAPS capsule take 1 capsule EVERY WEEK orally for 30 DAYS      HYDROcodone-acetaminophen (NORCO)  MG per tablet take 1 tablet by mouth every 8 hours if needed      HYDROcodone-acetaminophen (NORCO) 5-325 MG per tablet       hydrOXYzine HCl (ATARAX) 25 MG tablet Take 1 tablet by mouth nightly      ibuprofen (ADVIL;MOTRIN) 800 MG tablet take 1 tablet by mouth once daily if needed for pain      RA MELATONIN 5 MG TABS tablet Take 1 tablet by mouth nightly at bedtime.      polyethylene glycol (GLYCOLAX) 17 GM/SCOOP powder take 17GM (DISSOLVED IN WATER) by mouth once daily if needed for constipation      thiamine 100 MG tablet Take 1 tablet by mouth daily      Water For Irrigation, Sterile (STERILE WATER FOR IRRIGATION)       acetaminophen (TYLENOL) 325 MG tablet Take 2 tablets by mouth every 6 hours as needed for Pain      amLODIPine (NORVASC) 10 MG tablet 1 tablet by Per G Tube route daily 30 tablet 0    doxazosin (CARDURA) 1 MG tablet 1 tablet by Per G Tube route at bedtime 30 tablet 3    ferrous sulfate (IRON 325) 325 (65 Fe) MG tablet 1 tablet by Per G Tube route every other day 30 tablet 0    folic acid (FOLVITE) 1 MG tablet 1 tablet by Per NG tube route daily 30

## 2024-04-03 NOTE — TELEPHONE ENCOUNTER
Spoke with Lili and and they are wondering if Donna FERNANDEZ will be able to sign plan of care for home health as Ava Mejia is no longer signing. Discussed with Donna FERNANDEZ and she will sign plan of care until wound is healed. Updated Lili of this.

## 2024-04-25 ENCOUNTER — HOSPITAL ENCOUNTER (OUTPATIENT)
Dept: WOUND CARE | Age: 44
Discharge: HOME | End: 2024-04-25
Attending: NURSE PRACTITIONER
Payer: MEDICAID

## 2024-04-25 VITALS — TEMPERATURE: 97.2 F

## 2024-04-25 DIAGNOSIS — T81.89XA NON-HEALING SURGICAL WOUND, INITIAL ENCOUNTER: Primary | ICD-10-CM

## 2024-04-25 PROCEDURE — 17250 CHEM CAUT OF GRANLTJ TISSUE: CPT

## 2024-04-25 PROCEDURE — 6370000000 HC RX 637 (ALT 250 FOR IP): Performed by: NURSE PRACTITIONER

## 2024-04-25 RX ORDER — SODIUM CHLOR/HYPOCHLOROUS ACID 0.033 %
SOLUTION, IRRIGATION IRRIGATION ONCE
OUTPATIENT
Start: 2024-04-25 | End: 2024-04-25

## 2024-04-25 RX ORDER — GINSENG 100 MG
CAPSULE ORAL ONCE
OUTPATIENT
Start: 2024-04-25 | End: 2024-04-25

## 2024-04-25 RX ORDER — GENTAMICIN SULFATE 1 MG/G
OINTMENT TOPICAL ONCE
OUTPATIENT
Start: 2024-04-25 | End: 2024-04-25

## 2024-04-25 RX ORDER — BACITRACIN ZINC AND POLYMYXIN B SULFATE 500; 1000 [USP'U]/G; [USP'U]/G
OINTMENT TOPICAL ONCE
OUTPATIENT
Start: 2024-04-25 | End: 2024-04-25

## 2024-04-25 RX ORDER — LIDOCAINE HYDROCHLORIDE 20 MG/ML
JELLY TOPICAL ONCE
OUTPATIENT
Start: 2024-04-25 | End: 2024-04-25

## 2024-04-25 RX ORDER — LIDOCAINE 40 MG/G
CREAM TOPICAL ONCE
OUTPATIENT
Start: 2024-04-25 | End: 2024-04-25

## 2024-04-25 RX ORDER — CLOBETASOL PROPIONATE 0.5 MG/G
OINTMENT TOPICAL ONCE
OUTPATIENT
Start: 2024-04-25 | End: 2024-04-25

## 2024-04-25 RX ORDER — TRIAMCINOLONE ACETONIDE 1 MG/G
OINTMENT TOPICAL ONCE
OUTPATIENT
Start: 2024-04-25 | End: 2024-04-25

## 2024-04-25 RX ORDER — IBUPROFEN 200 MG
TABLET ORAL ONCE
OUTPATIENT
Start: 2024-04-25 | End: 2024-04-25

## 2024-04-25 RX ORDER — LIDOCAINE HYDROCHLORIDE 40 MG/ML
SOLUTION TOPICAL ONCE
OUTPATIENT
Start: 2024-04-25 | End: 2024-04-25

## 2024-04-25 RX ORDER — BETAMETHASONE DIPROPIONATE 0.5 MG/G
CREAM TOPICAL ONCE
OUTPATIENT
Start: 2024-04-25 | End: 2024-04-25

## 2024-04-25 RX ORDER — LIDOCAINE 50 MG/G
OINTMENT TOPICAL ONCE
OUTPATIENT
Start: 2024-04-25 | End: 2024-04-25

## 2024-04-25 RX ADMIN — SILVER NITRATE APPLICATORS 1 EACH: 25; 75 STICK TOPICAL at 11:14

## 2024-04-25 NOTE — PLAN OF CARE
Problem: Wound:  Goal: Will show signs of wound healing; wound closure and no evidence of infection  Description: Will show signs of wound healing; wound closure and no evidence of infection  Outcome: Progressing   Pt. Seen today for left buttock wound see AVS for new orders. Follow up in 4 weeks.  Care plan reviewed with patient.  Patient verbalize understanding of the plan of care and contribute to goal setting.

## 2024-04-25 NOTE — PROGRESS NOTES
Wound Care Supplies      Supply Company:     [employer] [address] [place] [address] f: [phone] f: [phone] p: [phone] [email or web address]      Ordering Center:   [facility]  [address] [address]  [phone]  WOUND CARE Dept: [phone]   FAX NUMBER [phone]    Patient Information:      Anirudh Segal  [facility] [address]   [phone]   : 1980  AGE: 43 y.o.     GENDER: male   EPISODE DATE: 2024    Insurance:      PRIMARY INSURANCE:  Plan: MEDICAID [facility] DEPT OF JOB  Coverage: [place]  Effective Date: 2024  Group Number: [unfilled]  Subscriber Number: [id number] - (Medicaid)    Payer/Plan Subscr  Sex Relation Sub. Ins. ID Effective Group Num   1. [employer]E COMMU* ANIRUDH SEGAL* 1980 Male Self [id number] 1/1/15                                    [address]   2. [place] -* ANIRUDH SEGAL* 1980 Male Self [id number] 24                                    [address]       Patient Wound Information:      Problem List Items Addressed This Visit          Other    Nonhealing surgical wound - Primary    Relevant Orders    Initiate Outpatient Wound Care Protocol       WOUNDS REQUIRING DRESSING SUPPLIES:     Wound 10/19/23 Mouth Right (Active)   Number of days: 188       Wound 24 Buttocks Left (Active)   Wound Image   24 1109   Wound Etiology Non-Healing Surgical 24 1109   Dressing Status Intact;New dressing applied 24 1109   Wound Cleansed Cleansed with saline 24 1109   Dressing/Treatment Foam;Triad hydro/zinc oxide-based hydrophilic paste 24 1109   Offloading for Diabetic Foot Ulcers Offloading not required 24 1109   Wound Length (cm) 2 cm 24 1109   Wound Width (cm) 1 cm 24 1109   Wound Depth (cm) 0 cm 24 1109   Wound Surface Area (cm^2) 2 cm^2 24 1109   Change in Wound Size % (l*w) 96.86 24 1109 
[facility]  [address]  [address]   [place], [place] [address]  Telephone: [phone]     FAX [phone]    Home health agencies: [facility] Phone # [phone], Fax # [phone]    Patient Instructions   Visit Discharge/Physician Orders:  - Make sure you are getting good nutrition and plenty of protein in your diet. Continue the protein drinks.  - Try to keep pressure off of your wound. Try and reposition frequently, every 1-2 hours while awake.   - Continue offloading cushion in wheelchair and any chair you sit in.   - Try to cut back/quit smoking to help with wound healing.   - silver nitrate applied there will be some gray drainage this is normal    Home Care: [facility]     Wound Location: Left buttock    Dressing orders:     1) Gather wound care supplies and arrange on clean table.     2) Wash your hands with soap and water or use alcohol based hand  for 20 seconds (sing \"Happy Birthday\" twice).    3) Cleanse wounds with normal saline or wound cleanser and gauze. Pat dry with clean gauze.    4) Left buttock- Apply triad cream to wound. Apply skin prep to skin around the wound to protect it. Cover with a silicone bordered foam dressing. Change 3 times weekly.    Keep all dressings clean & dry.    Follow up visit:   4 Weeks - Thursday May 23rd at 10:30 am     Supplies:  - Mechanical Debridement was completed today by using a saline and gauze.     Duration of dressings: 30 days    We have sent your supply order to the following company:  [employer] Phone # [phone]   If you don't receive the items you were expecting or don't know what the items are that you received, call the company where the order was sent.   If you are unable to obtain wound supplies, continue to use the supplies you have available until you are able to reach us. It is most important to keep the wound covered at all times.  It is YOUR responsibility to make sure that supplies 
dressing/foam    For wounds that need additional secondary dressing to help pad or control additional drainage/exudates, add foam, absorbent pad or hydrocolloid    For wounds with suspected or known infection, apply antimicrobial mesh and/or antimicrobial alginate/hydrofiber, or antimicrobial solution moistened gauze/kerlex, or equivalent and cover with secondary dressing/foam    Compression Management needed for edema control, apply multilayer compression or tubular garment or equivalent    Offloading Management needed for pressure relief, apply offloading shoe/boot or equivalent     Standing Status:   Standing     Number of Occurrences:   1     Discharge Instructions       Patient Instructions   Visit Discharge/Physician Orders:  - Make sure you are getting good nutrition and plenty of protein in your diet. Continue the protein drinks.  - Try to keep pressure off of your wound. Try and reposition frequently, every 1-2 hours while awake.   - Continue offloading cushion in wheelchair and any chair you sit in.   - Try to cut back/quit smoking to help with wound healing.   - Dressing supplies ordered through [facility] and will be delivered to your home, call if you do not receive within the next 2-3 days.   - Discontinue Dakin's solution.  -debridement done today     Home Care: [facility]     Wound Location: Left buttock    Dressing orders:     1) Gather wound care supplies and arrange on clean table.     2) Wash your hands with soap and water or use alcohol based hand  for 20 seconds (sing \"Happy Birthday\" twice).    3) Cleanse wounds with normal saline or wound cleanser and gauze. Pat dry with clean gauze.    4) Left buttock- Apply triad cream to wound. Apply skin prep to skin around the wound to protect it. Cover with a silicone bordered foam dressing. Change 3 times weekly.    Keep all dressings clean & dry.    Follow up visit:   3 Weeks - Thursday April 25th at 11:00 am     Supplies:  -

## 2024-04-25 NOTE — PATIENT INSTRUCTIONS
Visit Discharge/Physician Orders:  - Make sure you are getting good nutrition and plenty of protein in your diet. Continue the protein drinks.  - Try to keep pressure off of your wound. Try and reposition frequently, every 1-2 hours while awake.   - Continue offloading cushion in wheelchair and any chair you sit in.   - Try to cut back/quit smoking to help with wound healing.   - silver nitrate applied there will be some gray drainage this is normal    Home Care: [facility]     Wound Location: Left buttock    Dressing orders:     1) Gather wound care supplies and arrange on clean table.     2) Wash your hands with soap and water or use alcohol based hand  for 20 seconds (sing \"Happy Birthday\" twice).    3) Cleanse wounds with normal saline or wound cleanser and gauze. Pat dry with clean gauze.    4) Left buttock- Apply triad cream to wound. Apply skin prep to skin around the wound to protect it. Cover with a silicone bordered foam dressing. Change 3 times weekly.    Keep all dressings clean & dry.    Follow up visit:   4 Weeks - Thursday May 23rd at 10:30 am     Supplies:  - Mechanical Debridement was completed today by using a saline and gauze.     Duration of dressings: 30 days    We have sent your supply order to the following company:  "[employer] Phone # [phone]   If you don't receive the items you were expecting or don't know what the items are that you received, call the company where the order was sent.   If you are unable to obtain wound supplies, continue to use the supplies you have available until you are able to reach us. It is most important to keep the wound covered at all times.  It is YOUR responsibility to make sure that supplies are re-ordered before you run out. Re-order telephone numbers are included in each package.     Keep next scheduled appointment. Please give 24 hour notice if unable to keep appointment. [phone]    If you experience any of the following, please

## 2024-05-22 ENCOUNTER — TELEPHONE (OUTPATIENT)
Dept: FAMILY MEDICINE CLINIC | Age: 44
End: 2024-05-22

## 2024-05-23 ENCOUNTER — HOSPITAL ENCOUNTER (OUTPATIENT)
Dept: WOUND CARE | Age: 44
Discharge: HOME | End: 2024-05-23
Attending: NURSE PRACTITIONER
Payer: MEDICAID

## 2024-05-23 VITALS
OXYGEN SATURATION: 95 % | HEART RATE: 101 BPM | DIASTOLIC BLOOD PRESSURE: 90 MMHG | TEMPERATURE: 98 F | RESPIRATION RATE: 18 BRPM | SYSTOLIC BLOOD PRESSURE: 142 MMHG

## 2024-05-23 DIAGNOSIS — T81.89XA NON-HEALING SURGICAL WOUND, INITIAL ENCOUNTER: Primary | ICD-10-CM

## 2024-05-23 PROCEDURE — 99213 OFFICE O/P EST LOW 20 MIN: CPT

## 2024-05-23 PROCEDURE — 99213 OFFICE O/P EST LOW 20 MIN: CPT | Performed by: NURSE PRACTITIONER

## 2024-05-23 RX ORDER — LIDOCAINE 50 MG/G
OINTMENT TOPICAL ONCE
OUTPATIENT
Start: 2024-05-23 | End: 2024-05-23

## 2024-05-23 RX ORDER — GENTAMICIN SULFATE 1 MG/G
OINTMENT TOPICAL ONCE
OUTPATIENT
Start: 2024-05-23 | End: 2024-05-23

## 2024-05-23 RX ORDER — LIDOCAINE HYDROCHLORIDE 20 MG/ML
JELLY TOPICAL ONCE
OUTPATIENT
Start: 2024-05-23 | End: 2024-05-23

## 2024-05-23 RX ORDER — IBUPROFEN 200 MG
TABLET ORAL ONCE
OUTPATIENT
Start: 2024-05-23 | End: 2024-05-23

## 2024-05-23 RX ORDER — CLOBETASOL PROPIONATE 0.5 MG/G
OINTMENT TOPICAL ONCE
OUTPATIENT
Start: 2024-05-23 | End: 2024-05-23

## 2024-05-23 RX ORDER — BETAMETHASONE DIPROPIONATE 0.5 MG/G
CREAM TOPICAL ONCE
OUTPATIENT
Start: 2024-05-23 | End: 2024-05-23

## 2024-05-23 RX ORDER — LIDOCAINE HYDROCHLORIDE 40 MG/ML
SOLUTION TOPICAL ONCE
OUTPATIENT
Start: 2024-05-23 | End: 2024-05-23

## 2024-05-23 RX ORDER — SODIUM CHLOR/HYPOCHLOROUS ACID 0.033 %
SOLUTION, IRRIGATION IRRIGATION ONCE
OUTPATIENT
Start: 2024-05-23 | End: 2024-05-23

## 2024-05-23 RX ORDER — LIDOCAINE 40 MG/G
CREAM TOPICAL ONCE
OUTPATIENT
Start: 2024-05-23 | End: 2024-05-23

## 2024-05-23 RX ORDER — GINSENG 100 MG
CAPSULE ORAL ONCE
OUTPATIENT
Start: 2024-05-23 | End: 2024-05-23

## 2024-05-23 RX ORDER — BACITRACIN ZINC AND POLYMYXIN B SULFATE 500; 1000 [USP'U]/G; [USP'U]/G
OINTMENT TOPICAL ONCE
OUTPATIENT
Start: 2024-05-23 | End: 2024-05-23

## 2024-05-23 RX ORDER — TRIAMCINOLONE ACETONIDE 1 MG/G
OINTMENT TOPICAL ONCE
OUTPATIENT
Start: 2024-05-23 | End: 2024-05-23

## 2024-05-23 NOTE — PLAN OF CARE
Problem: Wound:  Goal: Will show signs of wound healing; wound closure and no evidence of infection  Description: Will show signs of wound healing; wound closure and no evidence of infection  Outcome: Progressing   Seen today for left buttock wound. See AVS for discharge.   Care plan reviewed with patient.  Patient verbalizes understanding of the plan of care and contribute to goal setting.

## 2024-05-23 NOTE — PATIENT INSTRUCTIONS
Visit Discharge/Physician Orders:  - Make sure you are getting good nutrition and plenty of protein in your diet. Continue the protein drinks.  - Try to keep pressure off of your wound. Try and reposition frequently, every 1-2 hours while awake.   - Continue offloading cushion in wheelchair and any chair you sit in.   - Try to cut back/quit smoking to help with wound healing.     Home Care: [facility]     Wound Location: Left buttock    Dressing orders:     1) Gather wound care supplies and arrange on clean table.     2) Wash your hands with soap and water or use alcohol based hand  for 20 seconds (sing \"Happy Birthday\" twice).    3) Cleanse wounds with normal saline or wound cleanser and gauze. Pat dry with clean gauze.    4) Left buttock- Apply triad cream to wound. Apply skin prep to skin around the wound to protect it. Cover with a silicone bordered foam dressing. Change 3 times weekly.    Keep all dressings clean & dry.    Follow up visit:   4 weeks Thursday June 20th at 10:30 am     Supplies:  - Mechanical Debridement was completed today by using a saline and gauze.     Duration of dressings: 30 days    We have sent your supply order to the following company:  [employer] Phone # [phone]   If you don't receive the items you were expecting or don't know what the items are that you received, call the company where the order was sent.   If you are unable to obtain wound supplies, continue to use the supplies you have available until you are able to reach us. It is most important to keep the wound covered at all times.  It is YOUR responsibility to make sure that supplies are re-ordered before you run out. Re-order telephone numbers are included in each package.     Keep next scheduled appointment. Please give 24 hour notice if unable to keep appointment. [phone]    If you experience any of the following, please call the Wound Care Service during business hours: Monday through Friday 8:00

## 2024-05-23 NOTE — PROGRESS NOTES
[facility]  [address]   [place], [place] [address]  Telephone: [phone]     FAX [phone]    Home health agencies: [facility] Phone # [phone], Fax # [phone]        Patient Instructions   Visit Discharge/Physician Orders:  - Make sure you are getting good nutrition and plenty of protein in your diet. Continue the protein drinks.  - Try to keep pressure off of your wound. Try and reposition frequently, every 1-2 hours while awake.   - Continue offloading cushion in wheelchair and any chair you sit in.   - Try to cut back/quit smoking to help with wound healing.     Home Care: [facility]     Wound Location: Left buttock    Dressing orders:     1) Gather wound care supplies and arrange on clean table.     2) Wash your hands with soap and water or use alcohol based hand  for 20 seconds (sing \"Happy Birthday\" twice).    3) Cleanse wounds with normal saline or wound cleanser and gauze. Pat dry with clean gauze.    4) Left buttock- Apply triad cream to wound. Apply skin prep to skin around the wound to protect it. Cover with a silicone bordered foam dressing. Change 3 times weekly.    Keep all dressings clean & dry.    Follow up visit:   4 weeks Thursday June 20th at 10:30 am     Supplies:  - Mechanical Debridement was completed today by using a saline and gauze.     Duration of dressings: 30 days    We have sent your supply order to the following company:  [employer] Phone # [phone]   If you don't receive the items you were expecting or don't know what the items are that you received, call the company where the order was sent.   If you are unable to obtain wound supplies, continue to use the supplies you have available until you are able to reach us. It is most important to keep the wound covered at all times.  It is YOUR responsibility to make sure that supplies are re-ordered before you run out. Re-order telephone numbers are 
1017   Wound Volume (cm^3) 0 cm^3 05/23/24 1017   Wound Healing % 100 05/23/24 1017   Wound Assessment Hyper granulation tissue 05/23/24 1017   Drainage Amount Moderate (25-50%) 05/23/24 1017   Drainage Description Serosanguinous 05/23/24 1017   Odor None 05/23/24 1017   Gifty-wound Assessment Hyperkeratosis (callous) 05/23/24 1017   Margins Attached edges 05/23/24 1017   Wound Thickness Description not for Pressure Injury Full thickness 05/23/24 1017   Number of days: 70     Incision 06/08/23 Thigh Right;Lateral (Active)   Number of days: 350       Incision 10/13/23 Buttocks Left (Active)   Number of days: 223       Incision 10/16/23 Abdomen (Active)   Number of days: 219       Incision 10/23/23 Abdomen Medial;Upper (Active)   Number of days: 212       Supplies Requested :        *DISPENSE AS WRITTEN*    WOUND #: 1   PRIMARY DRESSING:  Other: triad  paste   Cover and Secure with: Other silicone boarder foam      FREQUENCY OF DRESSING CHANGES:  Three times per week         ADDITIONAL ITEMS:  [] Gloves Small  [] Gloves Medium [x] Gloves Large [] Gloves XLarge  [] Tape 1\" [x] Tape 2\" [] Tape 3\"  [] Medipore Tape  [x] Saline  [x] Skin Prep   [] Adhesive Remover   [x] Cotton Tip Applicators   [] Other:    Patient Wound(s) Debrided: [x] Yes if yes please add date 5-23-24   [] No    Debribement Type: Mechanical     Patient currently being seen by Home Health: [] Yes   [x] No    Duration for needed supplies:  []15  [x]30  []60  []90 Days        Electronically signed by Yue Arce RN on 5/23/2024 at 10:35 AM     Provider Information:      PROVIDER'S NAME: Lucero Bradley CNP     NPI: [id number]        
Health     Wound Location: Left buttock    Dressing orders:     1) Gather wound care supplies and arrange on clean table.     2) Wash your hands with soap and water or use alcohol based hand  for 20 seconds (sing \"Happy Birthday\" twice).    3) Cleanse wounds with normal saline or wound cleanser and gauze. Pat dry with clean gauze.    4) Left buttock- Apply triad cream to wound. Apply skin prep to skin around the wound to protect it. Cover with a silicone bordered foam dressing. Change 3 times weekly.    Keep all dressings clean & dry.    Follow up visit:   4 weeks Thursday June 20th at 10:30 am     Supplies:  - Mechanical Debridement was completed today by using a saline and gauze.     Duration of dressings: 30 days    We have sent your supply order to the following company:  [employer] Phone # [phone]   If you don't receive the items you were expecting or don't know what the items are that you received, call the company where the order was sent.   If you are unable to obtain wound supplies, continue to use the supplies you have available until you are able to reach us. It is most important to keep the wound covered at all times.  It is YOUR responsibility to make sure that supplies are re-ordered before you run out. Re-order telephone numbers are included in each package.     Keep next scheduled appointment. Please give 24 hour notice if unable to keep appointment. [phone]    If you experience any of the following, please call the Wound Care Service during business hours: Monday through Friday 8:00 am - 4:30 pm  ([phone]).   *Increase in pain   *Temperature over 101   *Increase in drainage from your wound or a foul odor   *Uncontrolled swelling   *Need for compression bandage changes due to slippage, breakthrough drainage    If you need medical attention outside of business hours, please contact your Primary Care Doctor or go to the nearest emergency room.                Electronically signed

## 2024-05-28 ENCOUNTER — TELEPHONE (OUTPATIENT)
Dept: SURGERY | Age: 44
End: 2024-05-28

## 2024-05-28 ENCOUNTER — OFFICE VISIT (OUTPATIENT)
Dept: SURGERY | Age: 44
End: 2024-05-28
Payer: MEDICAID

## 2024-05-28 VITALS
DIASTOLIC BLOOD PRESSURE: 88 MMHG | HEIGHT: 66 IN | OXYGEN SATURATION: 99 % | TEMPERATURE: 98.2 F | BODY MASS INDEX: 27.05 KG/M2 | SYSTOLIC BLOOD PRESSURE: 138 MMHG | WEIGHT: 168.3 LBS | HEART RATE: 104 BPM

## 2024-05-28 DIAGNOSIS — Z93.3 COLOSTOMY PRESENT (HCC): ICD-10-CM

## 2024-05-28 DIAGNOSIS — Z01.818 PRE-OP TESTING: ICD-10-CM

## 2024-05-28 DIAGNOSIS — T81.89XD NON-HEALING SURGICAL WOUND, SUBSEQUENT ENCOUNTER: ICD-10-CM

## 2024-05-28 DIAGNOSIS — Z72.0 TOBACCO ABUSE: ICD-10-CM

## 2024-05-28 DIAGNOSIS — Z93.3 COLOSTOMY IN PLACE (HCC): Primary | ICD-10-CM

## 2024-05-28 DIAGNOSIS — G81.91 RIGHT HEMIPARESIS (HCC): ICD-10-CM

## 2024-05-28 DIAGNOSIS — I10 ESSENTIAL HYPERTENSION: ICD-10-CM

## 2024-05-28 PROCEDURE — 99214 OFFICE O/P EST MOD 30 MIN: CPT | Performed by: SURGERY

## 2024-05-28 PROCEDURE — 3079F DIAST BP 80-89 MM HG: CPT | Performed by: SURGERY

## 2024-05-28 PROCEDURE — 3075F SYST BP GE 130 - 139MM HG: CPT | Performed by: SURGERY

## 2024-05-28 ASSESSMENT — ENCOUNTER SYMPTOMS
BLOOD IN STOOL: 0
ANAL BLEEDING: 0
ABDOMINAL DISTENTION: 0
ABDOMINAL PAIN: 0

## 2024-05-28 NOTE — TELEPHONE ENCOUNTER
Patient scheduled for surgery with Dr. Gallagher on 07- at 7:30am with an arrival of 5:45am.  Preop orders (H/H, BMP) and surgery instructions given.  Bowel preop reviewed and given.  Patient instructed to do an enema the night before surgery and the morning of surgery.  Flagyl to be called into for; to (do) Centers.  Surgery consent signed.  Antibacterial soap given.  Patient advised to stop smoking prior to surgery.

## 2024-05-28 NOTE — PROGRESS NOTES
[facility] GENERAL SURGERY  General Surgery  Clinic Post op Note    Pt Name: Anirudh Segal  Medical Record Number: [id number]  Date of Birth 1980   Today's Date: 5/28/24      ASSESSMENT       ICD-10-CM    1. Colostomy in place (HCC)  Z93.3       2. Non-healing surgical wound, subsequent encounter  T81.89XD       3. Essential hypertension  I10       4. Pre-op testing  Z01.818       5. Tobacco abuse  Z72.0       6. Colostomy present (HCC)  Z93.3       7. Right hemiparesis (HCC)  G81.91            PLAN   I discussed with patient the surgery, how was performed.  The risks and benefits risk include but not limited to bleeding infection anastomotic leak need for more procedures reviewed placement of colostomy.  I discussed with him that surgery cannot be done until he is to stop smoking for at least 6 weeks.  Patient states he will start smoking cessation today.  He understands that smoking increases his risk of anastomotic leak.  I will schedule him for colostomy reversal.  He is to be given a bowel prep.  This is to be 6 weeks out from today.  SUBJECTIVE   Anirudh  wound is healing significantly, and is down to less than an inch in size.  He has been doing offloading mechanisms.  He would like to have his colostomy reversed.  He has been seeing wound care and has had a lot of success.  He does continue to smoke tobacco products.  He has not had any issues maintaining his colostomy.      Review of Systems   Constitutional:  Negative for activity change, appetite change and chills.   HENT: Negative.     Gastrointestinal:  Negative for abdominal distention, abdominal pain, anal bleeding and blood in stool.   Genitourinary: Negative.    Skin:  Positive for wound.   Neurological: Negative.    Hematological: Negative.    Psychiatric/Behavioral: Negative.         CURRENT MEDICATIONS     Current Outpatient Medications on File Prior to Visit   Medication Sig Dispense Refill    acetic acid 0.25 %

## 2024-05-28 NOTE — TELEPHONE ENCOUNTER
Surgery Scheduling Form   [facility] [address] [place], [place] [address]    Phone * [phone]   Surgical Scheduling Direct line Phone * [phone]  Fax * [phone]      Anirudh Tomlinsonjeremy Segal      1980    male    [address] [facility] [address]   Marital Status:    Single     Home Phone: [phone]   Cell Phone:   Telephone Information:   Mobile [phone]              Surgeon: Dr. Gallagher  Surgery Date:07-   Time: 7:30am     Procedure: Takedown colostomy   Inpatient       Diagnosis: Colostomy in place    Important Medical History: In Epic    Special Inst/Equip:     CPT Codes: 26772    Latex Allergy:   no Cardiac Device:  no    Anesthesia Type: General    Case Location:  Main OR     Preadmission Testing: Phone Call      PAT Date and Time: ________________________________    PAT Confirmation #: _________________________________    Post Op Visit:  ______________________________________    Need Preop Cardiac Clearance:   no    Does patient have Cardiologist/physician? No      Surgery Conformation #:  ______________________________________________    : __________________________________ Date:____________________        Insurance Company Name:  [place] [employer]

## 2024-06-03 ENCOUNTER — TELEPHONE (OUTPATIENT)
Dept: SURGERY | Age: 44
End: 2024-06-03

## 2024-06-03 DIAGNOSIS — Z93.3 COLOSTOMY IN PLACE (HCC): Primary | ICD-10-CM

## 2024-06-03 RX ORDER — METRONIDAZOLE 500 MG/1
500 TABLET ORAL 3 TIMES DAILY
Qty: 1 TABLET | Refills: 0 | Status: SHIPPED | OUTPATIENT
Start: 2024-06-03 | End: 2024-06-04

## 2024-06-14 ENCOUNTER — PREP FOR PROCEDURE (OUTPATIENT)
Dept: SURGERY | Age: 44
End: 2024-06-14

## 2024-06-17 RX ORDER — SODIUM CHLORIDE 0.9 % (FLUSH) 0.9 %
5-40 SYRINGE (ML) INJECTION PRN
Status: CANCELLED | OUTPATIENT
Start: 2024-06-17

## 2024-06-17 RX ORDER — SODIUM CHLORIDE, SODIUM LACTATE, POTASSIUM CHLORIDE, CALCIUM CHLORIDE 600; 310; 30; 20 MG/100ML; MG/100ML; MG/100ML; MG/100ML
INJECTION, SOLUTION INTRAVENOUS CONTINUOUS
Status: CANCELLED | OUTPATIENT
Start: 2024-06-17

## 2024-06-17 RX ORDER — SODIUM CHLORIDE 0.9 % (FLUSH) 0.9 %
5-40 SYRINGE (ML) INJECTION EVERY 12 HOURS SCHEDULED
Status: CANCELLED | OUTPATIENT
Start: 2024-06-17

## 2024-06-17 RX ORDER — SODIUM CHLORIDE 9 MG/ML
INJECTION, SOLUTION INTRAVENOUS PRN
Status: CANCELLED | OUTPATIENT
Start: 2024-06-17

## 2024-06-20 ENCOUNTER — HOSPITAL ENCOUNTER (OUTPATIENT)
Dept: WOUND CARE | Age: 44
Discharge: HOME | End: 2024-06-20
Attending: NURSE PRACTITIONER
Payer: MEDICAID

## 2024-06-20 DIAGNOSIS — T81.89XA NON-HEALING SURGICAL WOUND, INITIAL ENCOUNTER: ICD-10-CM

## 2024-06-20 DIAGNOSIS — T81.89XD NON-HEALING SURGICAL WOUND, SUBSEQUENT ENCOUNTER: Primary | ICD-10-CM

## 2024-06-20 PROCEDURE — 99212 OFFICE O/P EST SF 10 MIN: CPT

## 2024-06-20 PROCEDURE — 99212 OFFICE O/P EST SF 10 MIN: CPT | Performed by: NURSE PRACTITIONER

## 2024-06-20 RX ORDER — LIDOCAINE HYDROCHLORIDE 20 MG/ML
JELLY TOPICAL ONCE
Status: CANCELLED | OUTPATIENT
Start: 2024-06-20 | End: 2024-06-20

## 2024-06-20 RX ORDER — LIDOCAINE 40 MG/G
CREAM TOPICAL ONCE
Status: CANCELLED | OUTPATIENT
Start: 2024-06-20 | End: 2024-06-20

## 2024-06-20 RX ORDER — SODIUM CHLOR/HYPOCHLOROUS ACID 0.033 %
SOLUTION, IRRIGATION IRRIGATION ONCE
Status: CANCELLED | OUTPATIENT
Start: 2024-06-20 | End: 2024-06-20

## 2024-06-20 RX ORDER — IBUPROFEN 200 MG
TABLET ORAL ONCE
Status: CANCELLED | OUTPATIENT
Start: 2024-06-20 | End: 2024-06-20

## 2024-06-20 RX ORDER — GENTAMICIN SULFATE 1 MG/G
OINTMENT TOPICAL ONCE
Status: CANCELLED | OUTPATIENT
Start: 2024-06-20 | End: 2024-06-20

## 2024-06-20 RX ORDER — LIDOCAINE 50 MG/G
OINTMENT TOPICAL ONCE
Status: CANCELLED | OUTPATIENT
Start: 2024-06-20 | End: 2024-06-20

## 2024-06-20 RX ORDER — LIDOCAINE HYDROCHLORIDE 40 MG/ML
SOLUTION TOPICAL ONCE
Status: CANCELLED | OUTPATIENT
Start: 2024-06-20 | End: 2024-06-20

## 2024-06-20 RX ORDER — GINSENG 100 MG
CAPSULE ORAL ONCE
Status: CANCELLED | OUTPATIENT
Start: 2024-06-20 | End: 2024-06-20

## 2024-06-20 RX ORDER — TRIAMCINOLONE ACETONIDE 1 MG/G
OINTMENT TOPICAL ONCE
Status: CANCELLED | OUTPATIENT
Start: 2024-06-20 | End: 2024-06-20

## 2024-06-20 RX ORDER — BACITRACIN ZINC AND POLYMYXIN B SULFATE 500; 1000 [USP'U]/G; [USP'U]/G
OINTMENT TOPICAL ONCE
Status: CANCELLED | OUTPATIENT
Start: 2024-06-20 | End: 2024-06-20

## 2024-06-20 RX ORDER — CLOBETASOL PROPIONATE 0.5 MG/G
OINTMENT TOPICAL ONCE
Status: CANCELLED | OUTPATIENT
Start: 2024-06-20 | End: 2024-06-20

## 2024-06-20 RX ORDER — BETAMETHASONE DIPROPIONATE 0.5 MG/G
CREAM TOPICAL ONCE
Status: CANCELLED | OUTPATIENT
Start: 2024-06-20 | End: 2024-06-20

## 2024-06-20 NOTE — PROGRESS NOTES
[facility] Wound and Ostomy care  [address] [facility] [address] [address]   [place], [place] [address]  Telephone: [phone]     FAX [phone]    Home health agencies: [facility] Phone # [phone], Fax # [phone]      Patient Instructions   Visit Discharge/Physician Orders:  - Wound healed at today's appointment.  - Make sure to let staff know pre-op of area on the left buttocks so preventative measures can be taken before and after surgery.   - Make sure you are getting good nutrition and plenty of protein in your diet. Continue the protein drinks.  - Try to keep pressure off of your bottom. Try and reposition frequently, every 1-2 hours while awake.   - Continue offloading cushion in wheelchair and any chair you sit in.   - Try to cut back/quit smoking to help with wound healing.     Home Care: [facility]     Wound Location: Left buttock    Dressing orders:     1) Gather wound care supplies and arrange on clean table.     2) Wash your hands with soap and water or use alcohol based hand  for 20 seconds (sing \"Happy Birthday\" twice).    3) Cleanse wounds with normal saline or wound cleanser and gauze. Pat dry with clean gauze.    4) Left buttock- healed.     Keep all dressings clean & dry.    Follow up visit:   as needed.     Supplies:  - Mechanical Debridement was completed today by using a saline and gauze.     Duration of dressings: 30 days    We have sent your supply order to the following company:  [employer] Phone # [phone]   If you don't receive the items you were expecting or don't know what the items are that you received, call the company where the order was sent.   If you are unable to obtain wound supplies, continue to use the supplies you have available until you are able to reach us. It is most important to keep the wound covered at all times.  It is YOUR responsibility to make sure that supplies are re-ordered before you run out. Re-order telephone numbers are 
Brachial plexopathy G54.0    Epidural hematoma ([facility]) S06.4XAA    Contusion of cervical cord ([facility]) S14.109A    SAH (subarachnoid hemorrhage) ([facility]) I60.9    Cervical nerve root injury S14.2XXA    Muscle contusion T14.8XXA    Intertrochanteric fracture of right femur, closed, initial encounter ([facility]) S72.141A    Brain bleed ([facility]) I61.9    Severe malnutrition ([facility]) E43    Nonhealing surgical wound T81.89XA     PLAN     Patient examined and evaluated.    All available, pertinent lab work, radiology studies, and progress notes pertaining to Anirudh Segal reviewed prior to or during patient visit on today's date.      Problem List Items Addressed This Visit          Other    Nonhealing surgical wound - Primary         Wounds and Treatment Plan:  Nonhealing surgical wound  Wound has healed.  Discontinue previously ordered wound care.  Continue offloading to prevent recurrence of wound.    Education provided on the importance of notifying surgery staff of history of large wound to allow for proper offloading/positioning during surgery.  Also discussed importance of pressure offloading post-operatively to prevent recurrence of wound.      Follow up as needed.  Discharge patient from wound clinic today due to complete healing of wound.  Discontinue all previously ordered wound care.  Call clinic for any further needs or concerns.    All questions and concerns addressed prior to discharge from today's visit.    Please see attached Discharge Instructions    Written patient dismissal instructions given to patient and signed by patient or POA.         Discharge Instructions       Patient Instructions   Visit Discharge/Physician Orders:  - Wound healed at today's appointment.  - Make sure to let staff know pre-op of area on the left buttocks so preventative measures can be taken before and after surgery.   - Make sure you are getting good nutrition and plenty of protein in your diet. Continue the protein drinks.  - Try to

## 2024-06-20 NOTE — PLAN OF CARE
Problem: Wound:  Goal: Will show signs of wound healing; wound closure and no evidence of infection  Description: Will show signs of wound healing; wound closure and no evidence of infection  Outcome: Completed     Patient seen at the wound clinic today for evaluation of left buttocks, wound is healed at today's appointment, please see AVS. Care plan reviewed with patient.  Patient verbalizes understanding of the plan of care and contribute to goal setting.

## 2024-06-20 NOTE — PATIENT INSTRUCTIONS
Visit Discharge/Physician Orders:  - Wound healed at today's appointment.  - Make sure to let staff know pre-op of area on the left buttocks so preventative measures can be taken before and after surgery.   - Make sure you are getting good nutrition and plenty of protein in your diet. Continue the protein drinks.  - Try to keep pressure off of your bottom. Try and reposition frequently, every 1-2 hours while awake.   - Continue offloading cushion in wheelchair and any chair you sit in.   - Try to cut back/quit smoking to help with wound healing.     Home Care: [facility]     Wound Location: Left buttock    Dressing orders:     1) Gather wound care supplies and arrange on clean table.     2) Wash your hands with soap and water or use alcohol based hand  for 20 seconds (sing \"Happy Birthday\" twice).    3) Cleanse wounds with normal saline or wound cleanser and gauze. Pat dry with clean gauze.    4) Left buttock- healed.     Keep all dressings clean & dry.    Follow up visit:   as needed.     Supplies:  - Mechanical Debridement was completed today by using a saline and gauze.     Duration of dressings: 30 days    We have sent your supply order to the following company:  [employer] Phone # [phone]   If you don't receive the items you were expecting or don't know what the items are that you received, call the company where the order was sent.   If you are unable to obtain wound supplies, continue to use the supplies you have available until you are able to reach us. It is most important to keep the wound covered at all times.  It is YOUR responsibility to make sure that supplies are re-ordered before you run out. Re-order telephone numbers are included in each package.     Keep next scheduled appointment. Please give 24 hour notice if unable to keep appointment. [phone]    If you experience any of the following, please call the Wound Care Service during business hours: Monday through Friday 8:00

## 2024-06-26 ENCOUNTER — TELEPHONE (OUTPATIENT)
Dept: WOUND CARE | Age: 44
End: 2024-06-26

## 2024-06-26 NOTE — TELEPHONE ENCOUNTER
----- Message from Evi Sawant sent at 6/26/2024 11:23 AM EDT -----   [phone] LEFT VM (DID NOT LEAVE NAME) CALLING IN REGARDS TO [facility]INE'S PT [facility]. STATES NEED VERBAL ORDERS FOR HER TO CONTINUE TO SIGN HOME CARE ORDERS AND FOLLOW HIM FOR [facility]

## 2024-06-26 NOTE — TELEPHONE ENCOUNTER
Left message with HH letting them know we are no longer following the patient. Signed orders would need to come from patient's PCP. Office number left to call back with any questions.

## 2024-07-03 LAB
HCT VFR BLD CALC: 44.1 % (ref 40–51)
HEMOGLOBIN: 15.3 G/DL (ref 13.5–17)

## 2024-07-04 LAB
ANION GAP SERPL CALCULATED.3IONS-SCNC: 18 MEQ/L (ref 7–16)
BUN BLDV-MCNC: 12 MG/DL (ref 6–20)
CALCIUM SERPL-MCNC: 8.8 MG/DL (ref 8.5–10.5)
CHLORIDE BLD-SCNC: 102 MEQ/L (ref 95–107)
CO2: 17 MEQ/L (ref 19–31)
CREAT SERPL-MCNC: 0.69 MG/DL (ref 0.8–1.4)
EGFR IF NONAFRICAN AMERICAN: 118 ML/MIN/1.73
GLUCOSE: 84 MG/DL (ref 70–99)
POTASSIUM SERPL-SCNC: 3.8 MEQ/L (ref 3.5–5.4)
SODIUM BLD-SCNC: 137 MEQ/L (ref 133–146)

## 2024-07-05 NOTE — PROGRESS NOTES
Called patient to help remind him to start his bowel prep - Sunday starts with clear liquids only.  Pt verbalized understanding.

## 2024-07-08 ENCOUNTER — HOSPITAL ENCOUNTER (INPATIENT)
Age: 44
LOS: 5 days | Discharge: HOME | End: 2024-07-13
Attending: SURGERY | Admitting: SURGERY
Payer: MEDICAID

## 2024-07-08 ENCOUNTER — ANESTHESIA EVENT (OUTPATIENT)
Dept: OPERATING ROOM | Age: 44
End: 2024-07-08
Payer: MEDICAID

## 2024-07-08 ENCOUNTER — ANESTHESIA (OUTPATIENT)
Dept: OPERATING ROOM | Age: 44
End: 2024-07-08
Payer: MEDICAID

## 2024-07-08 DIAGNOSIS — Z93.3 COLOSTOMY IN PLACE (HCC): ICD-10-CM

## 2024-07-08 DIAGNOSIS — Z98.890 POST-OPERATIVE STATE: Primary | ICD-10-CM

## 2024-07-08 LAB
AMPHETAMINES UR QL SCN: NEGATIVE
BARBITURATES UR QL SCN: NEGATIVE
BENZODIAZ UR QL SCN: NEGATIVE
BZE UR QL SCN: NEGATIVE
CANNABINOIDS UR QL SCN: POSITIVE
FENTANYL: NEGATIVE
OPIATES UR QL SCN: NEGATIVE
OXYCODONE: NEGATIVE
PCP UR QL SCN: NEGATIVE

## 2024-07-08 PROCEDURE — 3700000000 HC ANESTHESIA ATTENDED CARE: Performed by: SURGERY

## 2024-07-08 PROCEDURE — 7100000001 HC PACU RECOVERY - ADDTL 15 MIN: Performed by: SURGERY

## 2024-07-08 PROCEDURE — 6360000002 HC RX W HCPCS: Performed by: SURGERY

## 2024-07-08 PROCEDURE — 6360000002 HC RX W HCPCS: Performed by: ANESTHESIOLOGY

## 2024-07-08 PROCEDURE — 2580000003 HC RX 258: Performed by: SURGERY

## 2024-07-08 PROCEDURE — 2720000010 HC SURG SUPPLY STERILE: Performed by: SURGERY

## 2024-07-08 PROCEDURE — 80307 DRUG TEST PRSMV CHEM ANLYZR: CPT

## 2024-07-08 PROCEDURE — 3700000001 HC ADD 15 MINUTES (ANESTHESIA): Performed by: SURGERY

## 2024-07-08 PROCEDURE — 7100000000 HC PACU RECOVERY - FIRST 15 MIN: Performed by: SURGERY

## 2024-07-08 PROCEDURE — 0D1E0Z4 BYPASS LARGE INTESTINE TO CUTANEOUS, OPEN APPROACH: ICD-10-PCS | Performed by: SURGERY

## 2024-07-08 PROCEDURE — 3600000004 HC SURGERY LEVEL 4 BASE: Performed by: SURGERY

## 2024-07-08 PROCEDURE — 3600000014 HC SURGERY LEVEL 4 ADDTL 15MIN: Performed by: SURGERY

## 2024-07-08 PROCEDURE — 88304 TISSUE EXAM BY PATHOLOGIST: CPT

## 2024-07-08 PROCEDURE — 3E033XZ INTRODUCTION OF VASOPRESSOR INTO PERIPHERAL VEIN, PERCUTANEOUS APPROACH: ICD-10-PCS | Performed by: SURGERY

## 2024-07-08 PROCEDURE — 6360000002 HC RX W HCPCS

## 2024-07-08 PROCEDURE — 2709999900 HC NON-CHARGEABLE SUPPLY: Performed by: SURGERY

## 2024-07-08 PROCEDURE — 6370000000 HC RX 637 (ALT 250 FOR IP): Performed by: NURSE PRACTITIONER

## 2024-07-08 PROCEDURE — 6360000002 HC RX W HCPCS: Performed by: NURSE ANESTHETIST, CERTIFIED REGISTERED

## 2024-07-08 PROCEDURE — 44340 REVISION OF COLOSTOMY: CPT | Performed by: SURGERY

## 2024-07-08 PROCEDURE — 44626 REPAIR BOWEL OPENING: CPT | Performed by: SURGERY

## 2024-07-08 PROCEDURE — 1200000000 HC SEMI PRIVATE

## 2024-07-08 PROCEDURE — 2500000003 HC RX 250 WO HCPCS: Performed by: NURSE ANESTHETIST, CERTIFIED REGISTERED

## 2024-07-08 RX ORDER — CLONIDINE 0.2 MG/24H
1 PATCH, EXTENDED RELEASE TRANSDERMAL WEEKLY
Status: DISCONTINUED | OUTPATIENT
Start: 2024-07-09 | End: 2024-07-13 | Stop reason: HOSPADM

## 2024-07-08 RX ORDER — FENTANYL CITRATE 50 UG/ML
50 INJECTION, SOLUTION INTRAMUSCULAR; INTRAVENOUS ONCE
Status: COMPLETED | OUTPATIENT
Start: 2024-07-08 | End: 2024-07-08

## 2024-07-08 RX ORDER — ONDANSETRON 2 MG/ML
INJECTION INTRAMUSCULAR; INTRAVENOUS PRN
Status: DISCONTINUED | OUTPATIENT
Start: 2024-07-08 | End: 2024-07-08 | Stop reason: SDUPTHER

## 2024-07-08 RX ORDER — DOXEPIN HYDROCHLORIDE 10 MG/1
10 CAPSULE ORAL NIGHTLY PRN
Status: DISCONTINUED | OUTPATIENT
Start: 2024-07-08 | End: 2024-07-13 | Stop reason: HOSPADM

## 2024-07-08 RX ORDER — GLYCOPYRROLATE 0.2 MG/ML
INJECTION INTRAMUSCULAR; INTRAVENOUS PRN
Status: DISCONTINUED | OUTPATIENT
Start: 2024-07-08 | End: 2024-07-08 | Stop reason: SDUPTHER

## 2024-07-08 RX ORDER — SODIUM CHLORIDE 9 MG/ML
INJECTION, SOLUTION INTRAVENOUS PRN
Status: DISCONTINUED | OUTPATIENT
Start: 2024-07-08 | End: 2024-07-08 | Stop reason: HOSPADM

## 2024-07-08 RX ORDER — BACLOFEN 10 MG/1
10 TABLET ORAL 3 TIMES DAILY
Status: DISCONTINUED | OUTPATIENT
Start: 2024-07-08 | End: 2024-07-13 | Stop reason: HOSPADM

## 2024-07-08 RX ORDER — PHENYLEPHRINE HCL IN 0.9% NACL 1 MG/10 ML
SYRINGE (ML) INTRAVENOUS PRN
Status: DISCONTINUED | OUTPATIENT
Start: 2024-07-08 | End: 2024-07-08 | Stop reason: SDUPTHER

## 2024-07-08 RX ORDER — PROPOFOL 10 MG/ML
INJECTION, EMULSION INTRAVENOUS PRN
Status: DISCONTINUED | OUTPATIENT
Start: 2024-07-08 | End: 2024-07-08 | Stop reason: SDUPTHER

## 2024-07-08 RX ORDER — LEVETIRACETAM 100 MG/ML
500 SOLUTION ORAL 2 TIMES DAILY
Status: DISCONTINUED | OUTPATIENT
Start: 2024-07-08 | End: 2024-07-08

## 2024-07-08 RX ORDER — ONDANSETRON 4 MG/1
4 TABLET, ORALLY DISINTEGRATING ORAL EVERY 8 HOURS PRN
Status: DISCONTINUED | OUTPATIENT
Start: 2024-07-08 | End: 2024-07-13 | Stop reason: HOSPADM

## 2024-07-08 RX ORDER — FOLIC ACID 1 MG/1
1 TABLET ORAL DAILY
Status: DISCONTINUED | OUTPATIENT
Start: 2024-07-09 | End: 2024-07-13 | Stop reason: HOSPADM

## 2024-07-08 RX ORDER — MULTIVITAMIN WITH IRON
1 TABLET ORAL DAILY
Status: DISCONTINUED | OUTPATIENT
Start: 2024-07-09 | End: 2024-07-13 | Stop reason: HOSPADM

## 2024-07-08 RX ORDER — FENTANYL CITRATE 50 UG/ML
INJECTION, SOLUTION INTRAMUSCULAR; INTRAVENOUS PRN
Status: DISCONTINUED | OUTPATIENT
Start: 2024-07-08 | End: 2024-07-08 | Stop reason: SDUPTHER

## 2024-07-08 RX ORDER — LEVOTHYROXINE SODIUM 0.05 MG/1
50 TABLET ORAL DAILY
Status: DISCONTINUED | OUTPATIENT
Start: 2024-07-09 | End: 2024-07-13 | Stop reason: HOSPADM

## 2024-07-08 RX ORDER — ROCURONIUM BROMIDE 10 MG/ML
INJECTION, SOLUTION INTRAVENOUS PRN
Status: DISCONTINUED | OUTPATIENT
Start: 2024-07-08 | End: 2024-07-08 | Stop reason: SDUPTHER

## 2024-07-08 RX ORDER — SODIUM CHLORIDE 0.9 % (FLUSH) 0.9 %
5-40 SYRINGE (ML) INJECTION PRN
Status: DISCONTINUED | OUTPATIENT
Start: 2024-07-08 | End: 2024-07-08 | Stop reason: HOSPADM

## 2024-07-08 RX ORDER — HYDROXYZINE HYDROCHLORIDE 25 MG/1
25 TABLET, FILM COATED ORAL NIGHTLY
Status: DISCONTINUED | OUTPATIENT
Start: 2024-07-08 | End: 2024-07-08

## 2024-07-08 RX ORDER — HYDROMORPHONE HYDROCHLORIDE 2 MG/ML
INJECTION, SOLUTION INTRAMUSCULAR; INTRAVENOUS; SUBCUTANEOUS PRN
Status: DISCONTINUED | OUTPATIENT
Start: 2024-07-08 | End: 2024-07-08 | Stop reason: SDUPTHER

## 2024-07-08 RX ORDER — LABETALOL HYDROCHLORIDE 5 MG/ML
INJECTION INTRAVENOUS PRN
Status: DISCONTINUED | OUTPATIENT
Start: 2024-07-08 | End: 2024-07-08 | Stop reason: SDUPTHER

## 2024-07-08 RX ORDER — FERROUS SULFATE 325(65) MG
325 TABLET ORAL EVERY OTHER DAY
Status: DISCONTINUED | OUTPATIENT
Start: 2024-07-09 | End: 2024-07-13 | Stop reason: HOSPADM

## 2024-07-08 RX ORDER — SODIUM CHLORIDE 0.9 % (FLUSH) 0.9 %
5-40 SYRINGE (ML) INJECTION EVERY 12 HOURS SCHEDULED
Status: DISCONTINUED | OUTPATIENT
Start: 2024-07-08 | End: 2024-07-13 | Stop reason: HOSPADM

## 2024-07-08 RX ORDER — AMLODIPINE BESYLATE 10 MG/1
10 TABLET ORAL DAILY
Status: DISCONTINUED | OUTPATIENT
Start: 2024-07-09 | End: 2024-07-13 | Stop reason: HOSPADM

## 2024-07-08 RX ORDER — FENTANYL CITRATE 50 UG/ML
INJECTION, SOLUTION INTRAMUSCULAR; INTRAVENOUS
Status: COMPLETED
Start: 2024-07-08 | End: 2024-07-08

## 2024-07-08 RX ORDER — SODIUM CHLORIDE, SODIUM LACTATE, POTASSIUM CHLORIDE, CALCIUM CHLORIDE 600; 310; 30; 20 MG/100ML; MG/100ML; MG/100ML; MG/100ML
INJECTION, SOLUTION INTRAVENOUS CONTINUOUS
Status: DISCONTINUED | OUTPATIENT
Start: 2024-07-08 | End: 2024-07-08 | Stop reason: HOSPADM

## 2024-07-08 RX ORDER — SODIUM CHLORIDE, SODIUM LACTATE, POTASSIUM CHLORIDE, CALCIUM CHLORIDE 600; 310; 30; 20 MG/100ML; MG/100ML; MG/100ML; MG/100ML
INJECTION, SOLUTION INTRAVENOUS CONTINUOUS
Status: DISCONTINUED | OUTPATIENT
Start: 2024-07-08 | End: 2024-07-13 | Stop reason: HOSPADM

## 2024-07-08 RX ORDER — ENOXAPARIN SODIUM 100 MG/ML
40 INJECTION SUBCUTANEOUS DAILY
Status: DISCONTINUED | OUTPATIENT
Start: 2024-07-09 | End: 2024-07-13 | Stop reason: HOSPADM

## 2024-07-08 RX ORDER — MIDAZOLAM HYDROCHLORIDE 1 MG/ML
INJECTION INTRAMUSCULAR; INTRAVENOUS PRN
Status: DISCONTINUED | OUTPATIENT
Start: 2024-07-08 | End: 2024-07-08 | Stop reason: SDUPTHER

## 2024-07-08 RX ORDER — ERGOCALCIFEROL 1.25 MG/1
50000 CAPSULE ORAL WEEKLY
Status: DISCONTINUED | OUTPATIENT
Start: 2024-07-09 | End: 2024-07-13 | Stop reason: HOSPADM

## 2024-07-08 RX ORDER — SODIUM CHLORIDE 9 MG/ML
INJECTION, SOLUTION INTRAVENOUS PRN
Status: DISCONTINUED | OUTPATIENT
Start: 2024-07-08 | End: 2024-07-13 | Stop reason: HOSPADM

## 2024-07-08 RX ORDER — POLYETHYLENE GLYCOL 3350 17 G/17G
17 POWDER, FOR SOLUTION ORAL DAILY
Status: DISCONTINUED | OUTPATIENT
Start: 2024-07-09 | End: 2024-07-09

## 2024-07-08 RX ORDER — DOXAZOSIN MESYLATE 1 MG/1
1 TABLET ORAL NIGHTLY
Status: DISCONTINUED | OUTPATIENT
Start: 2024-07-08 | End: 2024-07-08

## 2024-07-08 RX ORDER — SODIUM CHLORIDE 0.9 % (FLUSH) 0.9 %
5-40 SYRINGE (ML) INJECTION PRN
Status: DISCONTINUED | OUTPATIENT
Start: 2024-07-08 | End: 2024-07-13 | Stop reason: HOSPADM

## 2024-07-08 RX ORDER — PROMETHAZINE HYDROCHLORIDE 25 MG/ML
12.5 INJECTION, SOLUTION INTRAMUSCULAR; INTRAVENOUS EVERY 4 HOURS PRN
Status: DISCONTINUED | OUTPATIENT
Start: 2024-07-08 | End: 2024-07-13 | Stop reason: HOSPADM

## 2024-07-08 RX ORDER — LANOLIN ALCOHOL/MO/W.PET/CERES
4.5 CREAM (GRAM) TOPICAL NIGHTLY
Status: DISCONTINUED | OUTPATIENT
Start: 2024-07-08 | End: 2024-07-13 | Stop reason: HOSPADM

## 2024-07-08 RX ORDER — SODIUM CHLORIDE 0.9 % (FLUSH) 0.9 %
5-40 SYRINGE (ML) INJECTION EVERY 12 HOURS SCHEDULED
Status: DISCONTINUED | OUTPATIENT
Start: 2024-07-08 | End: 2024-07-08 | Stop reason: HOSPADM

## 2024-07-08 RX ORDER — LANOLIN ALCOHOL/MO/W.PET/CERES
100 CREAM (GRAM) TOPICAL DAILY
Status: DISCONTINUED | OUTPATIENT
Start: 2024-07-09 | End: 2024-07-13 | Stop reason: HOSPADM

## 2024-07-08 RX ORDER — KETAMINE HCL IN NACL, ISO-OSM 100MG/10ML
SYRINGE (ML) INJECTION PRN
Status: DISCONTINUED | OUTPATIENT
Start: 2024-07-08 | End: 2024-07-08 | Stop reason: SDUPTHER

## 2024-07-08 RX ORDER — LIDOCAINE HCL/PF 100 MG/5ML
SYRINGE (ML) INJECTION PRN
Status: DISCONTINUED | OUTPATIENT
Start: 2024-07-08 | End: 2024-07-08 | Stop reason: SDUPTHER

## 2024-07-08 RX ORDER — HYDROCHLOROTHIAZIDE 25 MG/1
25 TABLET ORAL DAILY
Status: DISCONTINUED | OUTPATIENT
Start: 2024-07-09 | End: 2024-07-13 | Stop reason: HOSPADM

## 2024-07-08 RX ORDER — ONDANSETRON 2 MG/ML
4 INJECTION INTRAMUSCULAR; INTRAVENOUS EVERY 6 HOURS PRN
Status: DISCONTINUED | OUTPATIENT
Start: 2024-07-08 | End: 2024-07-13 | Stop reason: HOSPADM

## 2024-07-08 RX ORDER — DEXAMETHASONE SODIUM PHOSPHATE 10 MG/ML
INJECTION, EMULSION INTRAMUSCULAR; INTRAVENOUS PRN
Status: DISCONTINUED | OUTPATIENT
Start: 2024-07-08 | End: 2024-07-08 | Stop reason: SDUPTHER

## 2024-07-08 RX ORDER — GABAPENTIN 300 MG/1
300 CAPSULE ORAL 3 TIMES DAILY
Status: DISCONTINUED | OUTPATIENT
Start: 2024-07-08 | End: 2024-07-13 | Stop reason: HOSPADM

## 2024-07-08 RX ADMIN — FENTANYL CITRATE 50 MCG: 50 INJECTION, SOLUTION INTRAMUSCULAR; INTRAVENOUS at 14:57

## 2024-07-08 RX ADMIN — Medication 4.5 MG: at 22:37

## 2024-07-08 RX ADMIN — FENTANYL CITRATE 25 MCG: 50 INJECTION INTRAMUSCULAR; INTRAVENOUS at 14:23

## 2024-07-08 RX ADMIN — LABETALOL HYDROCHLORIDE 5 MG: 5 INJECTION INTRAVENOUS at 12:28

## 2024-07-08 RX ADMIN — HYDROMORPHONE HYDROCHLORIDE 1 MG: 2 INJECTION INTRAMUSCULAR; INTRAVENOUS; SUBCUTANEOUS at 12:29

## 2024-07-08 RX ADMIN — WATER 2000 MG: 1 INJECTION INTRAMUSCULAR; INTRAVENOUS; SUBCUTANEOUS at 11:57

## 2024-07-08 RX ADMIN — FENTANYL CITRATE 150 MCG: 50 INJECTION INTRAMUSCULAR; INTRAVENOUS at 11:47

## 2024-07-08 RX ADMIN — SODIUM CHLORIDE, POTASSIUM CHLORIDE, SODIUM LACTATE AND CALCIUM CHLORIDE: 600; 310; 30; 20 INJECTION, SOLUTION INTRAVENOUS at 16:18

## 2024-07-08 RX ADMIN — FENTANYL CITRATE 25 MCG: 50 INJECTION INTRAMUSCULAR; INTRAVENOUS at 14:18

## 2024-07-08 RX ADMIN — ROCURONIUM BROMIDE 50 MG: 10 INJECTION INTRAVENOUS at 11:47

## 2024-07-08 RX ADMIN — HYDROMORPHONE HYDROCHLORIDE 0.5 MG: 1 INJECTION, SOLUTION INTRAMUSCULAR; INTRAVENOUS; SUBCUTANEOUS at 15:13

## 2024-07-08 RX ADMIN — FENTANYL CITRATE 50 MCG: 50 INJECTION, SOLUTION INTRAMUSCULAR; INTRAVENOUS at 15:28

## 2024-07-08 RX ADMIN — HYDROMORPHONE HYDROCHLORIDE 0.5 MG: 1 INJECTION, SOLUTION INTRAMUSCULAR; INTRAVENOUS; SUBCUTANEOUS at 15:07

## 2024-07-08 RX ADMIN — HYDROMORPHONE HYDROCHLORIDE 0.5 MG: 1 INJECTION, SOLUTION INTRAMUSCULAR; INTRAVENOUS; SUBCUTANEOUS at 15:18

## 2024-07-08 RX ADMIN — FENTANYL CITRATE 50 MCG: 50 INJECTION, SOLUTION INTRAMUSCULAR; INTRAVENOUS at 15:02

## 2024-07-08 RX ADMIN — SODIUM CHLORIDE, POTASSIUM CHLORIDE, SODIUM LACTATE AND CALCIUM CHLORIDE: 600; 310; 30; 20 INJECTION, SOLUTION INTRAVENOUS at 13:45

## 2024-07-08 RX ADMIN — HYDROMORPHONE HYDROCHLORIDE 0.5 MG: 1 INJECTION, SOLUTION INTRAMUSCULAR; INTRAVENOUS; SUBCUTANEOUS at 18:12

## 2024-07-08 RX ADMIN — ROCURONIUM BROMIDE 20 MG: 10 INJECTION INTRAVENOUS at 12:59

## 2024-07-08 RX ADMIN — LABETALOL HYDROCHLORIDE 5 MG: 5 INJECTION INTRAVENOUS at 14:33

## 2024-07-08 RX ADMIN — ROCURONIUM BROMIDE 20 MG: 10 INJECTION INTRAVENOUS at 13:18

## 2024-07-08 RX ADMIN — HYDROMORPHONE HYDROCHLORIDE 1 MG: 2 INJECTION INTRAMUSCULAR; INTRAVENOUS; SUBCUTANEOUS at 12:56

## 2024-07-08 RX ADMIN — ONDANSETRON 4 MG: 2 INJECTION INTRAMUSCULAR; INTRAVENOUS at 18:13

## 2024-07-08 RX ADMIN — GLYCOPYRROLATE 0.2 MG: 0.2 INJECTION INTRAMUSCULAR; INTRAVENOUS at 11:57

## 2024-07-08 RX ADMIN — ONDANSETRON 4 MG: 2 INJECTION INTRAMUSCULAR; INTRAVENOUS at 14:00

## 2024-07-08 RX ADMIN — SUGAMMADEX 200 MG: 100 INJECTION, SOLUTION INTRAVENOUS at 14:34

## 2024-07-08 RX ADMIN — HYDROMORPHONE HYDROCHLORIDE 0.5 MG: 1 INJECTION, SOLUTION INTRAMUSCULAR; INTRAVENOUS; SUBCUTANEOUS at 14:52

## 2024-07-08 RX ADMIN — FENTANYL CITRATE 50 MCG: 50 INJECTION INTRAMUSCULAR; INTRAVENOUS at 11:57

## 2024-07-08 RX ADMIN — Medication 100 MCG: at 12:32

## 2024-07-08 RX ADMIN — SODIUM CHLORIDE, POTASSIUM CHLORIDE, SODIUM LACTATE AND CALCIUM CHLORIDE: 600; 310; 30; 20 INJECTION, SOLUTION INTRAVENOUS at 09:58

## 2024-07-08 RX ADMIN — FENTANYL CITRATE 50 MCG: 50 INJECTION, SOLUTION INTRAMUSCULAR; INTRAVENOUS at 15:23

## 2024-07-08 RX ADMIN — MIDAZOLAM 2 MG: 1 INJECTION INTRAMUSCULAR; INTRAVENOUS at 11:42

## 2024-07-08 RX ADMIN — ROCURONIUM BROMIDE 10 MG: 10 INJECTION INTRAVENOUS at 13:43

## 2024-07-08 RX ADMIN — Medication 50 MG: at 11:47

## 2024-07-08 RX ADMIN — HYDROMORPHONE HYDROCHLORIDE 0.5 MG: 1 INJECTION, SOLUTION INTRAMUSCULAR; INTRAVENOUS; SUBCUTANEOUS at 21:14

## 2024-07-08 RX ADMIN — PROPOFOL 200 MG: 10 INJECTION, EMULSION INTRAVENOUS at 11:47

## 2024-07-08 RX ADMIN — DEXAMETHASONE SODIUM PHOSPHATE 10 MG: 10 INJECTION, EMULSION INTRAMUSCULAR; INTRAVENOUS at 11:51

## 2024-07-08 RX ADMIN — SODIUM CHLORIDE, POTASSIUM CHLORIDE, SODIUM LACTATE AND CALCIUM CHLORIDE: 600; 310; 30; 20 INJECTION, SOLUTION INTRAVENOUS at 12:50

## 2024-07-08 RX ADMIN — Medication 100 MG: at 11:47

## 2024-07-08 RX ADMIN — Medication 100 MCG: at 12:34

## 2024-07-08 ASSESSMENT — PAIN - FUNCTIONAL ASSESSMENT
PAIN_FUNCTIONAL_ASSESSMENT: PREVENTS OR INTERFERES SOME ACTIVE ACTIVITIES AND ADLS
PAIN_FUNCTIONAL_ASSESSMENT: PREVENTS OR INTERFERES SOME ACTIVE ACTIVITIES AND ADLS
PAIN_FUNCTIONAL_ASSESSMENT: 0-10

## 2024-07-08 ASSESSMENT — PAIN SCALES - GENERAL
PAINLEVEL_OUTOF10: 8
PAINLEVEL_OUTOF10: 7
PAINLEVEL_OUTOF10: 10
PAINLEVEL_OUTOF10: 5
PAINLEVEL_OUTOF10: 4
PAINLEVEL_OUTOF10: 8
PAINLEVEL_OUTOF10: 0
PAINLEVEL_OUTOF10: 8
PAINLEVEL_OUTOF10: 10
PAINLEVEL_OUTOF10: 8
PAINLEVEL_OUTOF10: 10
PAINLEVEL_OUTOF10: 7
PAINLEVEL_OUTOF10: 7

## 2024-07-08 ASSESSMENT — PAIN DESCRIPTION - ORIENTATION
ORIENTATION: MID
ORIENTATION: MID;RIGHT
ORIENTATION: MID;LEFT
ORIENTATION: MID
ORIENTATION: MID

## 2024-07-08 ASSESSMENT — ENCOUNTER SYMPTOMS
ABDOMINAL PAIN: 0
BLOOD IN STOOL: 0
ANAL BLEEDING: 0
ABDOMINAL DISTENTION: 0

## 2024-07-08 ASSESSMENT — PAIN DESCRIPTION - LOCATION
LOCATION: ABDOMEN

## 2024-07-08 ASSESSMENT — PAIN DESCRIPTION - PAIN TYPE
TYPE: SURGICAL PAIN

## 2024-07-08 ASSESSMENT — PAIN DESCRIPTION - DESCRIPTORS
DESCRIPTORS: SHARP;STABBING
DESCRIPTORS: SHARP
DESCRIPTORS: ACHING
DESCRIPTORS: SORE
DESCRIPTORS: ACHING;CRAMPING
DESCRIPTORS: THROBBING

## 2024-07-08 ASSESSMENT — PAIN DESCRIPTION - FREQUENCY: FREQUENCY: CONTINUOUS

## 2024-07-08 ASSESSMENT — PAIN DESCRIPTION - ONSET: ONSET: ON-GOING

## 2024-07-08 NOTE — H&P
Hematological: Negative.    Psychiatric/Behavioral: Negative.         CURRENT MEDICATIONS     Current Outpatient Medications on File Prior to Visit   Medication Sig Dispense Refill    acetic acid 0.25 % irrigation       cloNIDine (CATAPRES) 0.2 MG/24HR PTWK apply 1 patch every week ON THURSDAY      doxepin (SINEQUAN) 10 MG capsule       vitamin D (ERGOCALCIFEROL) 1.25 MG (29611 UT) CAPS capsule take 1 capsule EVERY WEEK orally for 30 DAYS      HYDROcodone-acetaminophen (NORCO)  MG per tablet take 1 tablet by mouth every 8 hours if needed      hydrOXYzine HCl (ATARAX) 25 MG tablet Take 1 tablet by mouth nightly      ibuprofen (ADVIL;MOTRIN) 800 MG tablet take 1 tablet by mouth once daily if needed for pain      RA MELATONIN 5 MG TABS tablet Take 1 tablet by mouth nightly at bedtime.      polyethylene glycol (GLYCOLAX) 17 GM/SCOOP powder take 17GM (DISSOLVED IN WATER) by mouth once daily if needed for constipation      thiamine 100 MG tablet Take 1 tablet by mouth daily      Water For Irrigation, Sterile (STERILE WATER FOR IRRIGATION)       acetaminophen (TYLENOL) 325 MG tablet Take 2 tablets by mouth every 6 hours as needed for Pain      amLODIPine (NORVASC) 10 MG tablet 1 tablet by Per G Tube route daily 30 tablet 0    doxazosin (CARDURA) 1 MG tablet 1 tablet by Per G Tube route at bedtime 30 tablet 3    ferrous sulfate (IRON 325) 325 (65 Fe) MG tablet 1 tablet by Per G Tube route every other day 30 tablet 0    folic acid (FOLVITE) 1 MG tablet 1 tablet by Per NG tube route daily 30 tablet 3    hydroCHLOROthiazide (HYDRODIURIL) 25 MG tablet 1 tablet by Per G Tube route daily 30 tablet 3    levETIRAcetam (KEPPRA) 100 MG/ML solution 5 mLs by Per G Tube route 2 times daily (Patient taking differently: Take 5 mLs by mouth 2 times daily)  3    levothyroxine (SYNTHROID) 50 MCG tablet 1 tablet by Per G Tube route Daily (Patient taking differently: Take 1 tablet by mouth Daily) 30 tablet 0    Multiple Vitamin

## 2024-07-08 NOTE — OP NOTE
ability.  At the end of the ostomy was transected.  A pursestring suture was placed with a Prolene.  Anvil was inserted and the pursestring suture was secured.  EEA stapler was then inserted to the rectum, the anvil was brought out through the old staple line.  The bowel was aligned and connected.  The anvil was connected and the click was heard as it entered into the orange connector.  The anvil was then secured down and this stapler was taken to full tension.  The stapler was then fired.  It was and then untwisted turn and a half and gently removed.  Upon removing it the anvil did not come with the remainder of the staple, there was however copious amounts of stool that came with it..  There were 2 complete anastomotic rings.  There was howeverno anvil.  Rectal exam had to be performed and the handle was able to be removed.  A leak test was then performed and noted to be a leak in the anterior surface anastomosis.  This with the combined anvil dislodgment was felt should be read the anastomosis should be redone.  The anastomosis was taken down by dividing just below this with the stapler.  The anastomosis was then divided proximal and the colon.  The white line of Toldt was divided to give adequate length without tension.  Pursestring suture was again placed.  And was inserted and secured.  The EEA stapler was inserted to the rectum, again the spike was deployed out through the staple line.  The anvil was connected was clipped sealed and clipped.  The stapler was then tightened down to the maximum tension.  The stapler was then fired.  Was then removed after releasing by 1-1/2 turns.  Again the anvil did not come out the anvil was stuck within the rectum.  The anvil was felt inside the anastomosis.  1 sample was retrieved, it was noted to be in 2 separate pieces, there was an inner almost yet white rubberized ring that had .  The anastomosis was not intact.  At this point was felt that the patient's

## 2024-07-08 NOTE — PROGRESS NOTES
1446- pt to pacu, resp easy and unlabored, VSS, pt appears in no acute distress, pt states he is in pain  1452- dilaudid given  1457- fentanyl given  1502- fentanyl given  1507- dilaudid given, pt tolerating  1513- dilaudid given  1518- dilaudid given, pt upset due to unexpected complication leading to colostomy not being reversed  1523- fentanyl given  1525- Dr. Gallagher at bedside to talk with patient  1528- fentanyl given  1535- pt resting in with eyes opened, pt asking questions about future with colostomy, this RN re-educating patient on what Dr. Gallagher discussed with patient, resp easy and unlabored, VSS, pt appears in no acute distress  1540- report called to Kathy LAY on 5K  1548- pt meets criteria for discharge from pacu  1554- pt transported to floor  in stable condition

## 2024-07-08 NOTE — PLAN OF CARE
Problem: Discharge Planning  Goal: Discharge to home or other facility with appropriate resources  Outcome: Progressing   Discharge home.     Problem: Pain  Goal: Verbalizes/displays adequate comfort level or baseline comfort level  Outcome: Progressing   Patient will verbalize pain. Pain will be treated per orders and patient level.     Problem: Safety - Adult  Goal: Free from fall injury  Outcome: Progressing   Patient will not fall this shift. Patient will use call light appropriately for assistance. Bed alarm is on. Patient belongings are within reach. Hourly rounding.

## 2024-07-08 NOTE — ANESTHESIA POSTPROCEDURE EVALUATION
Department of Anesthesiology  Postprocedure Note    Patient: Anirudh Segal  MRN: [id number]  YOB: 1980  Date of evaluation: 7/8/2024    Procedure Summary       Date: 07/08/24 Room / Location: [facility] OR [address] [place] [address] OR    Anesthesia Start: 1142 Anesthesia Stop: 1449    Procedure: Attempted takedown colostomy, reformation of colostomy (Abdomen) Diagnosis:       Colostomy in place (HCC)      (Colostomy in place (HCC) [Z93.3])    Surgeons: Lucero Gallagher MD Responsible Provider: Seb Sandoval MD    Anesthesia Type: General ASA Status: 3            Anesthesia Type: General    Angeli Phase I: Angeli Score: 10    Angeli Phase II:      Anesthesia Post Evaluation    Patient location during evaluation: PACU  Patient participation: complete - patient participated  Level of consciousness: awake  Airway patency: patent  Nausea & Vomiting: no vomiting and no nausea  Cardiovascular status: hemodynamically stable  Respiratory status: acceptable and nasal cannula  Hydration status: stable  Pain management: adequate    No notable events documented.

## 2024-07-08 NOTE — ANESTHESIA PRE PROCEDURE
Department of Anesthesiology  Preprocedure Note       Name:  Anirudh Segal   Age:  43 y.o.  :  1980                                          MRN:  [id number]         Date:  2024      Surgeon: Surgeon(s):  Lucero Gallagher MD    Procedure: Procedure(s):  Takedown Colostomy    Medications prior to admission:   Prior to Admission medications    Medication Sig Start Date End Date Taking? Authorizing Provider   acetic acid 0.25 % irrigation  23   Terry Phillips MD   cloNIDine (CATAPRES) 0.2 MG/24HR PTWK apply 1 patch every week ON 24   Terry Phillips MD   doxepin (SINEQUAN) 10 MG capsule     Terry Phillips MD   vitamin D (ERGOCALCIFEROL) 1.25 MG (03256 UT) CAPS capsule take 1 capsule EVERY WEEK orally for 30 DAYS 3/14/24   Terry Phillips MD   HYDROcodone-acetaminophen (NORCO)  MG per tablet take 1 tablet by mouth every 8 hours if needed 3/12/24   Terry Phillips MD   hydrOXYzine HCl (ATARAX) 25 MG tablet Take 1 tablet by mouth nightly 24   Terry Phillips MD   ibuprofen (ADVIL;MOTRIN) 800 MG tablet take 1 tablet by mouth once daily if needed for pain 24   Terry Phillips MD   RA MELATONIN 5 MG TABS tablet Take 1 tablet by mouth nightly at bedtime. 3/2/24   Terry Phillips MD   polyethylene glycol (GLYCOLAX) 17 GM/SCOOP powder take 17GM (DISSOLVED IN WATER) by mouth once daily if needed for constipation 3/9/24   Terry Phillips MD   thiamine 100 MG tablet Take 1 tablet by mouth daily 3/2/24   Terry Phillips MD   Water For Irrigation, Sterile (STERILE WATER FOR IRRIGATION)  23   Terry Phillips MD   acetaminophen (TYLENOL) 325 MG tablet Take 2 tablets by mouth every 6 hours as needed for Pain    Terry Phillips MD   amLODIPine (NORVASC) 10 MG tablet 1 tablet by Per G Tube route daily 10/25/23   Bravo Richards MD   doxazosin (CARDURA) 1 MG tablet 1 tablet by Per G Tube route at

## 2024-07-09 LAB
ANION GAP SERPL CALC-SCNC: 14 MEQ/L (ref 8–16)
BUN SERPL-MCNC: 4 MG/DL (ref 7–22)
CALCIUM SERPL-MCNC: 7.9 MG/DL (ref 8.5–10.5)
CHLORIDE SERPL-SCNC: 101 MEQ/L (ref 98–111)
CO2 SERPL-SCNC: 22 MEQ/L (ref 23–33)
CREAT SERPL-MCNC: 0.7 MG/DL (ref 0.4–1.2)
DEPRECATED RDW RBC AUTO: 59.4 FL (ref 35–45)
ERYTHROCYTE [DISTWIDTH] IN BLOOD BY AUTOMATED COUNT: 16.8 % (ref 11.5–14.5)
GFR SERPL CREATININE-BSD FRML MDRD: > 90 ML/MIN/1.73M2
GLUCOSE SERPL-MCNC: 115 MG/DL (ref 70–108)
HCT VFR BLD AUTO: 39.8 % (ref 42–52)
HGB BLD-MCNC: 13.8 GM/DL (ref 14–18)
MCH RBC QN AUTO: 33.6 PG (ref 26–33)
MCHC RBC AUTO-ENTMCNC: 34.7 GM/DL (ref 32.2–35.5)
MCV RBC AUTO: 96.8 FL (ref 80–94)
PLATELET # BLD AUTO: 221 THOU/MM3 (ref 130–400)
PMV BLD AUTO: 9.8 FL (ref 9.4–12.4)
POTASSIUM SERPL-SCNC: 3 MEQ/L (ref 3.5–5.2)
RBC # BLD AUTO: 4.11 MILL/MM3 (ref 4.7–6.1)
SODIUM SERPL-SCNC: 137 MEQ/L (ref 135–145)
WBC # BLD AUTO: 10.6 THOU/MM3 (ref 4.8–10.8)

## 2024-07-09 PROCEDURE — 1200000000 HC SEMI PRIVATE

## 2024-07-09 PROCEDURE — 85027 COMPLETE CBC AUTOMATED: CPT

## 2024-07-09 PROCEDURE — 80048 BASIC METABOLIC PNL TOTAL CA: CPT

## 2024-07-09 PROCEDURE — 6370000000 HC RX 637 (ALT 250 FOR IP): Performed by: NURSE PRACTITIONER

## 2024-07-09 PROCEDURE — 99024 POSTOP FOLLOW-UP VISIT: CPT | Performed by: NURSE PRACTITIONER

## 2024-07-09 PROCEDURE — 6360000002 HC RX W HCPCS: Performed by: NURSE PRACTITIONER

## 2024-07-09 PROCEDURE — 36415 COLL VENOUS BLD VENIPUNCTURE: CPT

## 2024-07-09 PROCEDURE — 6360000002 HC RX W HCPCS: Performed by: SURGERY

## 2024-07-09 PROCEDURE — APPSS30 APP SPLIT SHARED TIME 16-30 MINUTES: Performed by: NURSE PRACTITIONER

## 2024-07-09 PROCEDURE — 2580000003 HC RX 258: Performed by: SURGERY

## 2024-07-09 RX ORDER — POTASSIUM CHLORIDE 7.45 MG/ML
10 INJECTION INTRAVENOUS PRN
Status: DISCONTINUED | OUTPATIENT
Start: 2024-07-09 | End: 2024-07-13 | Stop reason: HOSPADM

## 2024-07-09 RX ORDER — POTASSIUM CHLORIDE 20 MEQ/1
40 TABLET, EXTENDED RELEASE ORAL PRN
Status: DISCONTINUED | OUTPATIENT
Start: 2024-07-09 | End: 2024-07-13 | Stop reason: HOSPADM

## 2024-07-09 RX ORDER — OXYCODONE HYDROCHLORIDE AND ACETAMINOPHEN 5; 325 MG/1; MG/1
1 TABLET ORAL EVERY 4 HOURS PRN
Status: DISCONTINUED | OUTPATIENT
Start: 2024-07-09 | End: 2024-07-13 | Stop reason: HOSPADM

## 2024-07-09 RX ORDER — POLYETHYLENE GLYCOL 3350 17 G/17G
17 POWDER, FOR SOLUTION ORAL DAILY
Status: DISCONTINUED | OUTPATIENT
Start: 2024-07-09 | End: 2024-07-13 | Stop reason: HOSPADM

## 2024-07-09 RX ORDER — PANTOPRAZOLE SODIUM 40 MG/10ML
40 INJECTION, POWDER, LYOPHILIZED, FOR SOLUTION INTRAVENOUS DAILY
Status: DISCONTINUED | OUTPATIENT
Start: 2024-07-09 | End: 2024-07-13 | Stop reason: HOSPADM

## 2024-07-09 RX ADMIN — OXYCODONE HYDROCHLORIDE AND ACETAMINOPHEN 1 TABLET: 5; 325 TABLET ORAL at 17:01

## 2024-07-09 RX ADMIN — HYDROMORPHONE HYDROCHLORIDE 0.5 MG: 1 INJECTION, SOLUTION INTRAMUSCULAR; INTRAVENOUS; SUBCUTANEOUS at 07:40

## 2024-07-09 RX ADMIN — BACLOFEN 10 MG: 10 TABLET ORAL at 21:16

## 2024-07-09 RX ADMIN — LEVOTHYROXINE SODIUM 50 MCG: 0.05 TABLET ORAL at 05:53

## 2024-07-09 RX ADMIN — BACLOFEN 10 MG: 10 TABLET ORAL at 09:25

## 2024-07-09 RX ADMIN — HYDROMORPHONE HYDROCHLORIDE 0.5 MG: 1 INJECTION, SOLUTION INTRAMUSCULAR; INTRAVENOUS; SUBCUTANEOUS at 04:36

## 2024-07-09 RX ADMIN — HYDROMORPHONE HYDROCHLORIDE 0.5 MG: 1 INJECTION, SOLUTION INTRAMUSCULAR; INTRAVENOUS; SUBCUTANEOUS at 10:38

## 2024-07-09 RX ADMIN — GABAPENTIN 300 MG: 300 CAPSULE ORAL at 21:16

## 2024-07-09 RX ADMIN — POLYETHYLENE GLYCOL 3350 17 G: 17 POWDER, FOR SOLUTION ORAL at 09:23

## 2024-07-09 RX ADMIN — FERROUS SULFATE TAB 325 MG (65 MG ELEMENTAL FE) 325 MG: 325 (65 FE) TAB at 09:23

## 2024-07-09 RX ADMIN — HYDROMORPHONE HYDROCHLORIDE 0.5 MG: 1 INJECTION, SOLUTION INTRAMUSCULAR; INTRAVENOUS; SUBCUTANEOUS at 00:37

## 2024-07-09 RX ADMIN — GABAPENTIN 300 MG: 300 CAPSULE ORAL at 09:25

## 2024-07-09 RX ADMIN — FOLIC ACID 1 MG: 1 TABLET ORAL at 09:23

## 2024-07-09 RX ADMIN — SODIUM CHLORIDE, POTASSIUM CHLORIDE, SODIUM LACTATE AND CALCIUM CHLORIDE: 600; 310; 30; 20 INJECTION, SOLUTION INTRAVENOUS at 00:20

## 2024-07-09 RX ADMIN — GABAPENTIN 300 MG: 300 CAPSULE ORAL at 13:37

## 2024-07-09 RX ADMIN — ONDANSETRON 4 MG: 2 INJECTION INTRAMUSCULAR; INTRAVENOUS at 05:53

## 2024-07-09 RX ADMIN — SODIUM CHLORIDE, PRESERVATIVE FREE 10 ML: 5 INJECTION INTRAVENOUS at 21:16

## 2024-07-09 RX ADMIN — Medication 1 TABLET: at 09:23

## 2024-07-09 RX ADMIN — HYDROMORPHONE HYDROCHLORIDE 0.5 MG: 1 INJECTION, SOLUTION INTRAMUSCULAR; INTRAVENOUS; SUBCUTANEOUS at 13:36

## 2024-07-09 RX ADMIN — OXYCODONE HYDROCHLORIDE AND ACETAMINOPHEN 1 TABLET: 5; 325 TABLET ORAL at 21:16

## 2024-07-09 RX ADMIN — Medication 100 MG: at 09:26

## 2024-07-09 RX ADMIN — AMLODIPINE BESYLATE 10 MG: 10 TABLET ORAL at 09:23

## 2024-07-09 RX ADMIN — BACLOFEN 10 MG: 10 TABLET ORAL at 13:37

## 2024-07-09 RX ADMIN — ERGOCALCIFEROL 50000 UNITS: 1.25 CAPSULE ORAL at 09:26

## 2024-07-09 RX ADMIN — SODIUM CHLORIDE, POTASSIUM CHLORIDE, SODIUM LACTATE AND CALCIUM CHLORIDE: 600; 310; 30; 20 INJECTION, SOLUTION INTRAVENOUS at 17:07

## 2024-07-09 RX ADMIN — SODIUM CHLORIDE, POTASSIUM CHLORIDE, SODIUM LACTATE AND CALCIUM CHLORIDE: 600; 310; 30; 20 INJECTION, SOLUTION INTRAVENOUS at 07:43

## 2024-07-09 RX ADMIN — PANTOPRAZOLE SODIUM 40 MG: 40 INJECTION, POWDER, FOR SOLUTION INTRAVENOUS at 17:02

## 2024-07-09 RX ADMIN — HYDROCHLOROTHIAZIDE 25 MG: 25 TABLET ORAL at 09:23

## 2024-07-09 RX ADMIN — Medication 4.5 MG: at 21:16

## 2024-07-09 ASSESSMENT — PAIN - FUNCTIONAL ASSESSMENT
PAIN_FUNCTIONAL_ASSESSMENT: ACTIVITIES ARE NOT PREVENTED
PAIN_FUNCTIONAL_ASSESSMENT: PREVENTS OR INTERFERES SOME ACTIVE ACTIVITIES AND ADLS
PAIN_FUNCTIONAL_ASSESSMENT: ACTIVITIES ARE NOT PREVENTED
PAIN_FUNCTIONAL_ASSESSMENT: PREVENTS OR INTERFERES SOME ACTIVE ACTIVITIES AND ADLS

## 2024-07-09 ASSESSMENT — PAIN DESCRIPTION - DESCRIPTORS
DESCRIPTORS: SORE
DESCRIPTORS: SHARP
DESCRIPTORS: ACHING
DESCRIPTORS: SHARP

## 2024-07-09 ASSESSMENT — PAIN SCALES - GENERAL
PAINLEVEL_OUTOF10: 7
PAINLEVEL_OUTOF10: 0
PAINLEVEL_OUTOF10: 7
PAINLEVEL_OUTOF10: 0
PAINLEVEL_OUTOF10: 10
PAINLEVEL_OUTOF10: 0
PAINLEVEL_OUTOF10: 6
PAINLEVEL_OUTOF10: 8
PAINLEVEL_OUTOF10: 0
PAINLEVEL_OUTOF10: 8
PAINLEVEL_OUTOF10: 4
PAINLEVEL_OUTOF10: 8
PAINLEVEL_OUTOF10: 7

## 2024-07-09 ASSESSMENT — PAIN DESCRIPTION - LOCATION
LOCATION: ABDOMEN

## 2024-07-09 ASSESSMENT — PAIN DESCRIPTION - ORIENTATION
ORIENTATION: MID
ORIENTATION: MID
ORIENTATION: MID;RIGHT
ORIENTATION: MID;RIGHT
ORIENTATION: MID
ORIENTATION: MID

## 2024-07-09 ASSESSMENT — PAIN DESCRIPTION - PAIN TYPE: TYPE: SURGICAL PAIN

## 2024-07-09 NOTE — CARE COORDINATION
07/09/24 1156   Service Assessment   Patient Orientation Alert and Oriented;Person;Place;Situation;Self   Cognition Alert   History Provided By Patient;Medical Record   Primary Caregiver Family   Accompanied By/Relationship Unaccompanied   Support Systems Parent;Children;Family Members   Patient's Healthcare Decision Maker is: Patient Declined (Legal Next of Kin Remains as Decision Maker)   PCP Verified by CM Yes   Last Visit to PCP Within last 6 months   Prior Functional Level Assistance with the following:;Mobility;Other (see comment)  (wound care)   Current Functional Level Mobility;Other (see comment)  (wound care)   Can patient return to prior living arrangement Yes   Ability to make needs known: Good   Family able to assist with home care needs: Yes   Would you like for me to discuss the discharge plan with any other family members/significant others, and if so, who? No   Financial Resources Medicaid   Community Resources ECF/Home Care   CM/SW Referral Other (see comment)  ([facility]   Social/Functional History   Active  Yes   Occupation On disability   Discharge Planning   Type of Residence House   Living Arrangements Children   Current Services Prior To Admission Durable Medical Equipment;Home Care   Current DME Prior to Arrival Cane;Wheelchair;Walker;Shower Chair   Potential Assistance Needed Home Care   DME Ordered? No   Potential Assistance Purchasing Medications No   Type of Home Care Services Nursing Services   Patient expects to be discharged to: House   History of falls? 0   Services At/After Discharge   Transition of Care Consult (CM Consult) Home Health   Internal Home Health No   Reason Outside Agency Chosen Patient already serviced by other home care/hospice agency  (Patient is current with [facility] for RN once weekly)   Services At/After Discharge Home Health;Nursing services   Confirm Follow Up Transport Family   Condition of Participation:

## 2024-07-09 NOTE — PLAN OF CARE
Problem: Discharge Planning  Goal: Discharge to home or other facility with appropriate resources  7/8/2024 2322 by Wanda Samuels, RN  Outcome: Progressing  Flowsheets (Taken 7/8/2024 1941)  Discharge to home or other facility with appropriate resources:   Identify barriers to discharge with patient and caregiver   Identify discharge learning needs (meds, wound care, etc)   Arrange for needed discharge resources and transportation as appropriate   Refer to discharge planning if patient needs post-hospital services based on physician order or complex needs related to functional status, cognitive ability or social support system  7/8/2024 2321 by Wanda Samuels, RN  Outcome: Progressing  Flowsheets (Taken 7/8/2024 1941)  Discharge to home or other facility with appropriate resources:   Identify barriers to discharge with patient and caregiver   Identify discharge learning needs (meds, wound care, etc)   Arrange for needed discharge resources and transportation as appropriate   Refer to discharge planning if patient needs post-hospital services based on physician order or complex needs related to functional status, cognitive ability or social support system  7/8/2024 1846 by Steph Alatorre, RN  Outcome: Progressing     Problem: Pain  Goal: Verbalizes/displays adequate comfort level or baseline comfort level  7/8/2024 2322 by Wanda Samuels, RN  Outcome: Progressing  Flowsheets (Taken 7/8/2024 2322)  Verbalizes/displays adequate comfort level or baseline comfort level:   Encourage patient to monitor pain and request assistance   Administer analgesics based on type and severity of pain and evaluate response   Implement non-pharmacological measures as appropriate and evaluate response   Assess pain using appropriate pain scale  7/8/2024 1846 by Steph Alatorre RN  Outcome: Progressing     Problem: Safety - Adult  Goal: Free from fall injury  7/8/2024 2322 by Wanda Samuels, RN  Outcome:  Progressing  Flowsheets (Taken 7/8/2024 2322)  Free From Fall Injury: Instruct family/caregiver on patient safety  7/8/2024 1846 by Steph Alatorre RN  Outcome: Progressing     Problem: Skin/Tissue Integrity  Goal: Absence of new skin breakdown  Description: 1.  Monitor for areas of redness and/or skin breakdown  2.  Assess vascular access sites hourly  3.  Every 4-6 hours minimum:  Change oxygen saturation probe site  4.  Every 4-6 hours:  If on nasal continuous positive airway pressure, respiratory therapy assess nares and determine need for appliance change or resting period.  Outcome: Progressing     Problem: Skin/Tissue Integrity - Adult  Goal: Skin integrity remains intact  Outcome: Progressing  Flowsheets (Taken 7/8/2024 2322)  Skin Integrity Remains Intact:   Monitor for areas of redness and/or skin breakdown   Assess vascular access sites hourly  Goal: Incisions, wounds, or drain sites healing without S/S of infection  Outcome: Progressing  Flowsheets (Taken 7/8/2024 2322)  Incisions, Wounds, or Drain Sites Healing Without Sign and Symptoms of Infection:   ADMISSION and DAILY: Assess and document risk factors for pressure ulcer development   TWICE DAILY: Assess and document skin integrity   Implement wound care per orders   TWICE DAILY: Assess and document dressing/incision, wound bed, drain sites and surrounding tissue     Problem: Gastrointestinal - Adult  Goal: Minimal or absence of nausea and vomiting  Outcome: Progressing  Flowsheets (Taken 7/8/2024 2322)  Minimal or absence of nausea and vomiting:   Administer IV fluids as ordered to ensure adequate hydration   Maintain NPO status until nausea and vomiting are resolved   Administer ordered antiemetic medications as needed   Provide nonpharmacologic comfort measures as appropriate  Goal: Maintains or returns to baseline bowel function  Outcome: Progressing  Flowsheets (Taken 7/8/2024 2322)  Maintains or returns to baseline bowel function:   Encourage

## 2024-07-09 NOTE — CARE COORDINATION
7/9/24, 12:29 PM EDT    DISCHARGE PLANNING EVALUATION    Received Social Work consult “needs help finding program to pay for OTC meds.\"  met with patient, unaware of any programs to assist with OTC meds.  Full Social Consult deferred.

## 2024-07-09 NOTE — PROGRESS NOTES
Patient noted to have blood covering almost half of his bed pad. Upon assessment, it appears to be seeping from his rectum. There is also bright red blood noted in the ostomy bag. Michelle Parker notified of these findings.

## 2024-07-09 NOTE — PLAN OF CARE
Problem: Discharge Planning  Goal: Discharge to home or other facility with appropriate resources  Outcome: Progressing  Discharge plan is in process. Plan discharge home with family.     Problem: Pain  Goal: Verbalizes/displays adequate comfort level or baseline comfort level  Outcome: Progressing  Patient states pain relief from PRN pain medications. Pain reassessed one hour post PRN pain medication given.  Patient rates pain 7-8 on IRENE 0-10 scale.     Problem: Safety - Adult  Goal: Free from fall injury  Outcome: Progressing  Fall assessment completed. Patient using call light appropriately to call for assistance with ambulation to bathroom.  Personal items within reach. Patient is also compliant with use of non-skid slippers.      Problem: Skin/Tissue Integrity  Goal: Absence of new skin breakdown  Description: 1.  Monitor for areas of redness and/or skin breakdown  2.  Assess vascular access sites hourly  3.  Every 4-6 hours minimum:  Change oxygen saturation probe site  4.  Every 4-6 hours:  If on nasal continuous positive airway pressure, respiratory therapy assess nares and determine need for appliance change or resting period.  Outcome: Progressing  No skin breakdown this shift. Patient being assisted with turning. Patients states understanding of repositioning every two hours.     Problem: Skin/Tissue Integrity - Adult  Goal: Skin integrity remains intact  Outcome: Progressing  No skin breakdown this shift. Patient being assisted with turning. Patients states understanding of repositioning every two hours.     Problem: Skin/Tissue Integrity - Adult  Goal: Incisions, wounds, or drain sites healing without S/S of infection  Outcome: Progressing  Abdominal incision healing.     Problem: Gastrointestinal - Adult  Goal: Minimal or absence of nausea and vomiting  Outcome: Progressing     Nausea at times. Patient taking Zofran PRN for nausea control.    Problem: Gastrointestinal - Adult  Goal: Maintains or

## 2024-07-09 NOTE — PROGRESS NOTES
Pressure dressing applied with gauze to the patients rectum with and ice pack per Michelle Parker.
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
## 2024-07-09 NOTE — CARE COORDINATION
Anirudh Tomlinsonngeldavid Segal requires a bedside commode due to being confined to one level of the home and there are no toilet facilities on that level, and is physically incapable of utilizing regular toilet facilities. Current body weight: Weight - Scale: 76.2 kg (168 lb).

## 2024-07-10 LAB
EKG ATRIAL RATE: 105 BPM
EKG P AXIS: 45 DEGREES
EKG P-R INTERVAL: 160 MS
EKG Q-T INTERVAL: 348 MS
EKG QRS DURATION: 92 MS
EKG QTC CALCULATION (BAZETT): 459 MS
EKG R AXIS: 57 DEGREES
EKG T AXIS: 41 DEGREES
EKG VENTRICULAR RATE: 105 BPM

## 2024-07-10 PROCEDURE — 93010 ELECTROCARDIOGRAM REPORT: CPT | Performed by: INTERNAL MEDICINE

## 2024-07-10 PROCEDURE — 2580000003 HC RX 258: Performed by: SURGERY

## 2024-07-10 PROCEDURE — 6370000000 HC RX 637 (ALT 250 FOR IP): Performed by: NURSE PRACTITIONER

## 2024-07-10 PROCEDURE — APPSS30 APP SPLIT SHARED TIME 16-30 MINUTES: Performed by: NURSE PRACTITIONER

## 2024-07-10 PROCEDURE — 93005 ELECTROCARDIOGRAM TRACING: CPT | Performed by: NURSE PRACTITIONER

## 2024-07-10 PROCEDURE — 6360000002 HC RX W HCPCS: Performed by: NURSE PRACTITIONER

## 2024-07-10 PROCEDURE — 1200000003 HC TELEMETRY R&B

## 2024-07-10 PROCEDURE — 99024 POSTOP FOLLOW-UP VISIT: CPT | Performed by: NURSE PRACTITIONER

## 2024-07-10 PROCEDURE — 6360000002 HC RX W HCPCS: Performed by: SURGERY

## 2024-07-10 RX ADMIN — OXYCODONE HYDROCHLORIDE AND ACETAMINOPHEN 1 TABLET: 5; 325 TABLET ORAL at 19:19

## 2024-07-10 RX ADMIN — BACLOFEN 10 MG: 10 TABLET ORAL at 20:38

## 2024-07-10 RX ADMIN — Medication 100 MG: at 08:44

## 2024-07-10 RX ADMIN — HYDROMORPHONE HYDROCHLORIDE 0.25 MG: 1 INJECTION, SOLUTION INTRAMUSCULAR; INTRAVENOUS; SUBCUTANEOUS at 16:07

## 2024-07-10 RX ADMIN — SODIUM CHLORIDE, POTASSIUM CHLORIDE, SODIUM LACTATE AND CALCIUM CHLORIDE: 600; 310; 30; 20 INJECTION, SOLUTION INTRAVENOUS at 17:30

## 2024-07-10 RX ADMIN — LEVOTHYROXINE SODIUM 50 MCG: 0.05 TABLET ORAL at 05:16

## 2024-07-10 RX ADMIN — HYDROMORPHONE HYDROCHLORIDE 0.25 MG: 1 INJECTION, SOLUTION INTRAMUSCULAR; INTRAVENOUS; SUBCUTANEOUS at 12:29

## 2024-07-10 RX ADMIN — PANTOPRAZOLE SODIUM 40 MG: 40 INJECTION, POWDER, FOR SOLUTION INTRAVENOUS at 08:50

## 2024-07-10 RX ADMIN — POLYETHYLENE GLYCOL 3350 17 G: 17 POWDER, FOR SOLUTION ORAL at 08:44

## 2024-07-10 RX ADMIN — SODIUM CHLORIDE, POTASSIUM CHLORIDE, SODIUM LACTATE AND CALCIUM CHLORIDE: 600; 310; 30; 20 INJECTION, SOLUTION INTRAVENOUS at 08:50

## 2024-07-10 RX ADMIN — DOXEPIN HYDROCHLORIDE 10 MG: 10 CAPSULE ORAL at 21:42

## 2024-07-10 RX ADMIN — GABAPENTIN 300 MG: 300 CAPSULE ORAL at 20:38

## 2024-07-10 RX ADMIN — GABAPENTIN 300 MG: 300 CAPSULE ORAL at 08:44

## 2024-07-10 RX ADMIN — OXYCODONE HYDROCHLORIDE AND ACETAMINOPHEN 1 TABLET: 5; 325 TABLET ORAL at 10:33

## 2024-07-10 RX ADMIN — ENOXAPARIN SODIUM 40 MG: 100 INJECTION SUBCUTANEOUS at 08:44

## 2024-07-10 RX ADMIN — BACLOFEN 10 MG: 10 TABLET ORAL at 14:39

## 2024-07-10 RX ADMIN — Medication 1 TABLET: at 08:44

## 2024-07-10 RX ADMIN — SODIUM CHLORIDE, PRESERVATIVE FREE 10 ML: 5 INJECTION INTRAVENOUS at 08:44

## 2024-07-10 RX ADMIN — Medication 4.5 MG: at 20:38

## 2024-07-10 RX ADMIN — OXYCODONE HYDROCHLORIDE AND ACETAMINOPHEN 1 TABLET: 5; 325 TABLET ORAL at 14:39

## 2024-07-10 RX ADMIN — OXYCODONE HYDROCHLORIDE AND ACETAMINOPHEN 1 TABLET: 5; 325 TABLET ORAL at 01:13

## 2024-07-10 RX ADMIN — HYDROMORPHONE HYDROCHLORIDE 0.25 MG: 1 INJECTION, SOLUTION INTRAMUSCULAR; INTRAVENOUS; SUBCUTANEOUS at 20:38

## 2024-07-10 RX ADMIN — AMLODIPINE BESYLATE 10 MG: 10 TABLET ORAL at 08:44

## 2024-07-10 RX ADMIN — OXYCODONE HYDROCHLORIDE AND ACETAMINOPHEN 1 TABLET: 5; 325 TABLET ORAL at 05:16

## 2024-07-10 RX ADMIN — HYDROMORPHONE HYDROCHLORIDE 0.25 MG: 1 INJECTION, SOLUTION INTRAMUSCULAR; INTRAVENOUS; SUBCUTANEOUS at 08:44

## 2024-07-10 RX ADMIN — OXYCODONE HYDROCHLORIDE AND ACETAMINOPHEN 1 TABLET: 5; 325 TABLET ORAL at 23:17

## 2024-07-10 RX ADMIN — BACLOFEN 10 MG: 10 TABLET ORAL at 08:44

## 2024-07-10 RX ADMIN — GABAPENTIN 300 MG: 300 CAPSULE ORAL at 14:39

## 2024-07-10 RX ADMIN — FOLIC ACID 1 MG: 1 TABLET ORAL at 08:44

## 2024-07-10 RX ADMIN — HYDROCHLOROTHIAZIDE 25 MG: 25 TABLET ORAL at 08:44

## 2024-07-10 RX ADMIN — SODIUM CHLORIDE, PRESERVATIVE FREE 10 ML: 5 INJECTION INTRAVENOUS at 20:31

## 2024-07-10 ASSESSMENT — PAIN SCALES - GENERAL
PAINLEVEL_OUTOF10: 7
PAINLEVEL_OUTOF10: 7
PAINLEVEL_OUTOF10: 8
PAINLEVEL_OUTOF10: 6
PAINLEVEL_OUTOF10: 7
PAINLEVEL_OUTOF10: 3
PAINLEVEL_OUTOF10: 7
PAINLEVEL_OUTOF10: 8
PAINLEVEL_OUTOF10: 8
PAINLEVEL_OUTOF10: 4
PAINLEVEL_OUTOF10: 8
PAINLEVEL_OUTOF10: 7
PAINLEVEL_OUTOF10: 8
PAINLEVEL_OUTOF10: 4

## 2024-07-10 ASSESSMENT — PAIN DESCRIPTION - ORIENTATION
ORIENTATION: RIGHT

## 2024-07-10 ASSESSMENT — PAIN DESCRIPTION - FREQUENCY: FREQUENCY: CONTINUOUS

## 2024-07-10 ASSESSMENT — PAIN DESCRIPTION - PAIN TYPE: TYPE: SURGICAL PAIN

## 2024-07-10 ASSESSMENT — PAIN DESCRIPTION - LOCATION
LOCATION: ABDOMEN

## 2024-07-10 ASSESSMENT — PAIN DESCRIPTION - ONSET: ONSET: ON-GOING

## 2024-07-10 ASSESSMENT — PAIN DESCRIPTION - DESCRIPTORS
DESCRIPTORS: ACHING;DISCOMFORT

## 2024-07-10 ASSESSMENT — PAIN - FUNCTIONAL ASSESSMENT: PAIN_FUNCTIONAL_ASSESSMENT: PREVENTS OR INTERFERES SOME ACTIVE ACTIVITIES AND ADLS

## 2024-07-10 NOTE — PLAN OF CARE
Problem: Discharge Planning  Goal: Discharge to home or other facility with appropriate resources  Outcome: Progressing  Flowsheets (Taken 7/10/2024 1539)  Discharge to home or other facility with appropriate resources:   Identify barriers to discharge with patient and caregiver   Arrange for needed discharge resources and transportation as appropriate   Identify discharge learning needs (meds, wound care, etc)     Problem: Pain  Goal: Verbalizes/displays adequate comfort level or baseline comfort level  Outcome: Progressing  Flowsheets (Taken 7/10/2024 1539)  Verbalizes/displays adequate comfort level or baseline comfort level:   Encourage patient to monitor pain and request assistance   Assess pain using appropriate pain scale   Administer analgesics based on type and severity of pain and evaluate response   Implement non-pharmacological measures as appropriate and evaluate response     Problem: Safety - Adult  Goal: Free from fall injury  Outcome: Progressing  Flowsheets (Taken 7/10/2024 1539)  Free From Fall Injury:   Instruct family/caregiver on patient safety   Based on caregiver fall risk screen, instruct family/caregiver to ask for assistance with transferring infant if caregiver noted to have fall risk factors     Problem: Skin/Tissue Integrity  Goal: Absence of new skin breakdown  Description: 1.  Monitor for areas of redness and/or skin breakdown  2.  Assess vascular access sites hourly  3.  Every 4-6 hours minimum:  Change oxygen saturation probe site  4.  Every 4-6 hours:  If on nasal continuous positive airway pressure, respiratory therapy assess nares and determine need for appliance change or resting period.  Outcome: Progressing  Note: Qshift skin assessment, patient independent w/ turns/repositioning, mobility and activity encouraged,wound care provided as needed, pillow support given     Problem: Skin/Tissue Integrity - Adult  Goal: Skin integrity remains intact  Outcome: Progressing  Flowsheets  intake, treat as appropriate   Assist with meals as needed   Monitor intake and output, weight and lab values   Obtain nutritional consult as needed     Problem: Gastrointestinal - Adult  Goal: Establish and maintain optimal ostomy function  Outcome: Progressing  Flowsheets (Taken 7/10/2024 1539)  Establish and maintain optimal ostomy function:   Monitor output from ostomies   Administer IV fluids and TPN as ordered   Nutrition consult   Infuse IV Fluids/TPN as ordered     Problem: Infection - Adult  Goal: Absence of infection at discharge  Outcome: Progressing  Flowsheets (Taken 7/10/2024 1539)  Absence of infection at discharge:   Monitor lab/diagnostic results   Assess and monitor for signs and symptoms of infection   Monitor all insertion sites i.e., indwelling lines, tubes and drains   Monitor endotracheal (as able) and nasal secretions for changes in amount and color   Tennyson appropriate cooling/warming therapies per order   Administer medications as ordered   Instruct and encourage patient and family to use good hand hygiene technique   Identify and instruct in appropriate isolation precautions for identified infection/condition     Problem: Metabolic/Fluid and Electrolytes - Adult  Goal: Electrolytes maintained within normal limits  Outcome: Progressing  Flowsheets (Taken 7/10/2024 1539)  Electrolytes maintained within normal limits:   Monitor labs and assess patient for signs and symptoms of electrolyte imbalances   Fluid restriction as ordered   Administer electrolyte replacement as ordered   Monitor response to electrolyte replacements, including repeat lab results as appropriate   Instruct patient on fluid and nutrition restrictions as appropriate     Care plan reviewed with patient.  Patient verbalizes understanding of the plan of care and contribute to goal setting.

## 2024-07-10 NOTE — PROCEDURES
PROCEDURE NOTE  Date: 7/10/2024   Name: Anirudh Tomlinsonjeremy Segal  YOB: 1980    Procedures EKG completed, RN notified
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
## 2024-07-11 ENCOUNTER — APPOINTMENT (OUTPATIENT)
Dept: GENERAL RADIOLOGY | Age: 44
End: 2024-07-11
Attending: SURGERY
Payer: MEDICAID

## 2024-07-11 LAB
HCT VFR BLD AUTO: 31.6 % (ref 42–52)
HCT VFR BLD AUTO: 34.4 % (ref 42–52)
HGB BLD-MCNC: 10.7 GM/DL (ref 14–18)
HGB BLD-MCNC: 11.6 GM/DL (ref 14–18)
POTASSIUM SERPL-SCNC: 3.1 MEQ/L (ref 3.5–5.2)

## 2024-07-11 PROCEDURE — 84132 ASSAY OF SERUM POTASSIUM: CPT

## 2024-07-11 PROCEDURE — 6360000002 HC RX W HCPCS: Performed by: NURSE PRACTITIONER

## 2024-07-11 PROCEDURE — 6370000000 HC RX 637 (ALT 250 FOR IP): Performed by: NURSE PRACTITIONER

## 2024-07-11 PROCEDURE — 85014 HEMATOCRIT: CPT

## 2024-07-11 PROCEDURE — 85018 HEMOGLOBIN: CPT

## 2024-07-11 PROCEDURE — 2580000003 HC RX 258: Performed by: SURGERY

## 2024-07-11 PROCEDURE — 36415 COLL VENOUS BLD VENIPUNCTURE: CPT

## 2024-07-11 PROCEDURE — APPSS30 APP SPLIT SHARED TIME 16-30 MINUTES: Performed by: NURSE PRACTITIONER

## 2024-07-11 PROCEDURE — 1200000003 HC TELEMETRY R&B

## 2024-07-11 PROCEDURE — 99024 POSTOP FOLLOW-UP VISIT: CPT | Performed by: NURSE PRACTITIONER

## 2024-07-11 PROCEDURE — 74018 RADEX ABDOMEN 1 VIEW: CPT

## 2024-07-11 PROCEDURE — 6360000002 HC RX W HCPCS: Performed by: SURGERY

## 2024-07-11 RX ADMIN — GABAPENTIN 300 MG: 300 CAPSULE ORAL at 22:31

## 2024-07-11 RX ADMIN — HYDROMORPHONE HYDROCHLORIDE 0.25 MG: 1 INJECTION, SOLUTION INTRAMUSCULAR; INTRAVENOUS; SUBCUTANEOUS at 01:25

## 2024-07-11 RX ADMIN — Medication 1 TABLET: at 08:48

## 2024-07-11 RX ADMIN — HYDROMORPHONE HYDROCHLORIDE 0.25 MG: 1 INJECTION, SOLUTION INTRAMUSCULAR; INTRAVENOUS; SUBCUTANEOUS at 13:46

## 2024-07-11 RX ADMIN — BACLOFEN 10 MG: 10 TABLET ORAL at 13:46

## 2024-07-11 RX ADMIN — Medication 100 MG: at 08:39

## 2024-07-11 RX ADMIN — OXYCODONE HYDROCHLORIDE AND ACETAMINOPHEN 1 TABLET: 5; 325 TABLET ORAL at 21:14

## 2024-07-11 RX ADMIN — POTASSIUM BICARBONATE 40 MEQ: 782 TABLET, EFFERVESCENT ORAL at 11:34

## 2024-07-11 RX ADMIN — PANTOPRAZOLE SODIUM 40 MG: 40 INJECTION, POWDER, FOR SOLUTION INTRAVENOUS at 08:39

## 2024-07-11 RX ADMIN — SODIUM CHLORIDE, POTASSIUM CHLORIDE, SODIUM LACTATE AND CALCIUM CHLORIDE: 600; 310; 30; 20 INJECTION, SOLUTION INTRAVENOUS at 10:25

## 2024-07-11 RX ADMIN — LEVOTHYROXINE SODIUM 50 MCG: 0.05 TABLET ORAL at 06:08

## 2024-07-11 RX ADMIN — BACLOFEN 10 MG: 10 TABLET ORAL at 08:39

## 2024-07-11 RX ADMIN — OXYCODONE HYDROCHLORIDE AND ACETAMINOPHEN 1 TABLET: 5; 325 TABLET ORAL at 11:37

## 2024-07-11 RX ADMIN — HYDROCHLOROTHIAZIDE 25 MG: 25 TABLET ORAL at 08:39

## 2024-07-11 RX ADMIN — BACLOFEN 10 MG: 10 TABLET ORAL at 21:14

## 2024-07-11 RX ADMIN — HYDROMORPHONE HYDROCHLORIDE 0.25 MG: 1 INJECTION, SOLUTION INTRAMUSCULAR; INTRAVENOUS; SUBCUTANEOUS at 22:32

## 2024-07-11 RX ADMIN — ENOXAPARIN SODIUM 40 MG: 100 INJECTION SUBCUTANEOUS at 08:39

## 2024-07-11 RX ADMIN — GABAPENTIN 300 MG: 300 CAPSULE ORAL at 08:48

## 2024-07-11 RX ADMIN — HYDROMORPHONE HYDROCHLORIDE 0.25 MG: 1 INJECTION, SOLUTION INTRAMUSCULAR; INTRAVENOUS; SUBCUTANEOUS at 17:37

## 2024-07-11 RX ADMIN — HYDROMORPHONE HYDROCHLORIDE 0.25 MG: 1 INJECTION, SOLUTION INTRAMUSCULAR; INTRAVENOUS; SUBCUTANEOUS at 08:23

## 2024-07-11 RX ADMIN — FOLIC ACID 1 MG: 1 TABLET ORAL at 08:39

## 2024-07-11 RX ADMIN — FERROUS SULFATE TAB 325 MG (65 MG ELEMENTAL FE) 325 MG: 325 (65 FE) TAB at 08:39

## 2024-07-11 RX ADMIN — GABAPENTIN 300 MG: 300 CAPSULE ORAL at 13:45

## 2024-07-11 RX ADMIN — AMLODIPINE BESYLATE 10 MG: 10 TABLET ORAL at 08:39

## 2024-07-11 RX ADMIN — SODIUM CHLORIDE, POTASSIUM CHLORIDE, SODIUM LACTATE AND CALCIUM CHLORIDE: 600; 310; 30; 20 INJECTION, SOLUTION INTRAVENOUS at 01:29

## 2024-07-11 RX ADMIN — OXYCODONE HYDROCHLORIDE AND ACETAMINOPHEN 1 TABLET: 5; 325 TABLET ORAL at 04:42

## 2024-07-11 RX ADMIN — Medication 4.5 MG: at 21:14

## 2024-07-11 RX ADMIN — SODIUM CHLORIDE, POTASSIUM CHLORIDE, SODIUM LACTATE AND CALCIUM CHLORIDE: 600; 310; 30; 20 INJECTION, SOLUTION INTRAVENOUS at 19:01

## 2024-07-11 ASSESSMENT — PAIN DESCRIPTION - LOCATION
LOCATION: ABDOMEN

## 2024-07-11 ASSESSMENT — PAIN SCALES - GENERAL
PAINLEVEL_OUTOF10: 7
PAINLEVEL_OUTOF10: 8
PAINLEVEL_OUTOF10: 7
PAINLEVEL_OUTOF10: 8

## 2024-07-11 ASSESSMENT — PAIN DESCRIPTION - ORIENTATION
ORIENTATION: RIGHT;LEFT;MID
ORIENTATION: RIGHT
ORIENTATION: RIGHT;LEFT
ORIENTATION: RIGHT
ORIENTATION: LEFT;RIGHT
ORIENTATION: RIGHT
ORIENTATION: RIGHT;LEFT

## 2024-07-11 ASSESSMENT — PAIN - FUNCTIONAL ASSESSMENT
PAIN_FUNCTIONAL_ASSESSMENT: ACTIVITIES ARE NOT PREVENTED

## 2024-07-11 ASSESSMENT — PAIN DESCRIPTION - DESCRIPTORS
DESCRIPTORS: ACHING
DESCRIPTORS: SHARP;ACHING;DISCOMFORT
DESCRIPTORS: ACHING;SHARP;CRAMPING;DISCOMFORT
DESCRIPTORS: ACHING;DISCOMFORT;SPASM;SHARP
DESCRIPTORS: ACHING
DESCRIPTORS: ACHING;SHARP;DISCOMFORT;SORE

## 2024-07-11 NOTE — PLAN OF CARE
Nutrition consult to assist patient with appropriate food choices  Goal: Maintains adequate nutritional intake  7/10/2024 1539 by Latha Richmond RN  Outcome: Progressing  Flowsheets (Taken 7/10/2024 1539)  Maintains adequate nutritional intake:   Monitor percentage of each meal consumed   Identify factors contributing to decreased intake, treat as appropriate   Assist with meals as needed   Monitor intake and output, weight and lab values   Obtain nutritional consult as needed  Goal: Establish and maintain optimal ostomy function  7/10/2024 1539 by Latha Richmond RN  Outcome: Progressing  Flowsheets (Taken 7/10/2024 1539)  Establish and maintain optimal ostomy function:   Monitor output from ostomies   Administer IV fluids and TPN as ordered   Nutrition consult   Infuse IV Fluids/TPN as ordered     Problem: Infection - Adult  Goal: Absence of infection at discharge  7/10/2024 1539 by Latha Richmond RN  Outcome: Progressing  Flowsheets (Taken 7/10/2024 1539)  Absence of infection at discharge:   Monitor lab/diagnostic results   Assess and monitor for signs and symptoms of infection   Monitor all insertion sites i.e., indwelling lines, tubes and drains   Monitor endotracheal (as able) and nasal secretions for changes in amount and color   [place] appropriate cooling/warming therapies per order   Administer medications as ordered   Instruct and encourage patient and family to use good hand hygiene technique   Identify and instruct in appropriate isolation precautions for identified infection/condition     Problem: Metabolic/Fluid and Electrolytes - Adult  Goal: Electrolytes maintained within normal limits  7/10/2024 1539 by Latha Richmond RN  Outcome: Progressing  Flowsheets (Taken 7/10/2024 1539)  Electrolytes maintained within normal limits:   Monitor labs and assess patient for signs and symptoms of electrolyte imbalances   Fluid restriction as ordered   Administer electrolyte replacement as ordered   Monitor  response to electrolyte replacements, including repeat lab results as appropriate   Instruct patient on fluid and nutrition restrictions as appropriate

## 2024-07-11 NOTE — PROGRESS NOTES
[facility] Wound Ostomy Continence Nurse  Ostomy Referral Progress Note      NAME:  Anirudh Segal  MEDICAL RECORD NUMBER:  [id number]  AGE: 43 y.o.   GENDER:  male  :  1980  TODAY'S DATE:  2024    Subjective   Reason for [facility] Evaluation and Assessment: new  ostomy      Anirudh Segal is a 43 y.o. male referred by:   [] Physician/ PA/ APRN-CNP  [x] Nursing  [] Other:       Surgeon: Lucero Gallagher MD  Date of surgery: 24    Objective     Daryl Risk Score Daryl Scale Score: 18    Assessment     Encounter: Present to patient room for new colostomy. Patient in bed. Assessment and photo to follow. One post-op piece pouching system removed. Cleansed stoma with warm wash cloth, pat dry. Skin prep applied to samara-stomal skin. Coloplast 2 piece yellow flange cut to fit to size, protective ring applied to inner edge of flange, centered over stoma, and applied. Pouch applied. Barrier extenders placed on outer edge of flange. Applied hands lightly over system to activate hydrocolloid. Educated patient step by step throughout pouching system application. Answered questions and concerns. Will plan to change pouching system and educate early week. Asked patient if he knew if he would be discharged before next week and patient stated \"he hopes not because he has bleeding from his rectum and he hopes it stops before he goes home\".  Recommend patient have home health assist with ostomy care after discharge. Ostomy educational information given to patient, with additional pouching systems in room. Will continue to follow. Bed in low, call light in reach.       OSTOMY ASSESSMENT:  Photo:    Ostomy Type: Colostomy  Ostomy location: LUQ  Size of stoma: 50 mm  Height:  Flat  Color: Light red  Mucocutaneous junction: intact  Peristomal skin: intact  Peristomal Creases: none  Stent/Catheter: n/a  Output:Other bowel sweat   Stool amount: none    Plan   Plan for Ostomy Care:    Ostomy Plan of Care  [x]  Supplies/Instructions left in room  [] Patient using home supplies  [] Current pouching system: coloplast yellow two piece flat    Current Diet: ADULT DIET; Full Liquid  Dietician consulted:  no    Discharge Plan:  Placement for patient upon discharge:   [x] Home  [] Inpatient Rehab  [] SNF  [] ECF  [] [facility]em/ LTAC    [x] Follow-up referral placed   [x] Follow-up appointment entered  [x] Discharge instructions in AVS  [x] New patient folder/education given  [x] Home Health care or facility care established  [x] Discharge supply pack given    Referrals:  [] / Case Management   [x] Home Health Care  [] Outpatient Ostomy Clinic    Recommend patient follow up in outpatient wound clinic with NAEL Goodrich.   [facility]  [address] [place] [address]  Phone: [phone]  Fax: [phone]  [x] Follow-up referral placed

## 2024-07-11 NOTE — PLAN OF CARE
Problem: Discharge Planning  Goal: Discharge to home or other facility with appropriate resources  7/11/2024 1619 by Julissa Smith, RN  Outcome: Progressing     Problem: Pain  Goal: Verbalizes/displays adequate comfort level or baseline comfort level  7/11/2024 1619 by Julissa Smith, RN  Outcome: Progressing     Problem: Safety - Adult  Goal: Free from fall injury  Outcome: Progressing     Problem: Skin/Tissue Integrity  Goal: Absence of new skin breakdown  Description: 1.  Monitor for areas of redness and/or skin breakdown  2.  Assess vascular access sites hourly  3.  Every 4-6 hours minimum:  Change oxygen saturation probe site  4.  Every 4-6 hours:  If on nasal continuous positive airway pressure, respiratory therapy assess nares and determine need for appliance change or resting period.  Outcome: Progressing     Problem: Skin/Tissue Integrity - Adult  Goal: Skin integrity remains intact  Outcome: Progressing     Problem: Skin/Tissue Integrity - Adult  Goal: Incisions, wounds, or drain sites healing without S/S of infection  Outcome: Progressing     Problem: Gastrointestinal - Adult  Goal: Minimal or absence of nausea and vomiting  Outcome: Progressing     Problem: Gastrointestinal - Adult  Goal: Maintains or returns to baseline bowel function  Outcome: Progressing     Problem: Gastrointestinal - Adult  Goal: Maintains adequate nutritional intake  Outcome: Progressing     Problem: Gastrointestinal - Adult  Goal: Establish and maintain optimal ostomy function  Outcome: Progressing     Problem: Infection - Adult  Goal: Absence of infection at discharge  Outcome: Progressing     Problem: Metabolic/Fluid and Electrolytes - Adult  Goal: Electrolytes maintained within normal limits  Outcome: Progressing     Care plan reviewed with patient , patient verbalized understanding of plan of care and contributed to goal setting.

## 2024-07-11 NOTE — CARE COORDINATION
7/11/24, 1:05 PM EDT    DISCHARGE ON GOING EVALUATION    Anirudh Burns [facility] day: 3  Location: -14/014-A Reason for admit: Colostomy in place (HCC) [Z93.3]  Post-operative state [Z98.890]     Procedures:   7/08 Takedown Colostomy- attempted. Complications: stapler mis firing with unsuccessful anastamosis, decision to replace ostomy.      Imaging since last note:   7/11 KUB: pending     Barriers to Discharge: General surgery following. POD 3. Cisneros out- good urine output. Colostomy care. MARICARMEN drain (25ml out in 24hrs). Full liquid diet. Has bloody mucous rectal discharge. Hgb 10.7 (13.8) repeat H&H at 4pm. Lovenox held. IVF @125. IV dilaudid prn. Percocet prn. K 3.1- replaced PO. Repeat labs in AM. KUB pending.     PCP: Nancy Joseph MD  Readmission Risk Score: 13.9    Patient Goals/Plan/Treatment Preferences: Plans to return home w/ son. Current with [facility] for RN- will need updated HH orders. Order present for BSC.
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
## 2024-07-12 LAB
DEPRECATED RDW RBC AUTO: 59.9 FL (ref 35–45)
ERYTHROCYTE [DISTWIDTH] IN BLOOD BY AUTOMATED COUNT: 16.3 % (ref 11.5–14.5)
HCT VFR BLD AUTO: 32.1 % (ref 42–52)
HGB BLD-MCNC: 10.8 GM/DL (ref 14–18)
MCH RBC QN AUTO: 33.8 PG (ref 26–33)
MCHC RBC AUTO-ENTMCNC: 33.6 GM/DL (ref 32.2–35.5)
MCV RBC AUTO: 100.3 FL (ref 80–94)
PLATELET # BLD AUTO: 236 THOU/MM3 (ref 130–400)
PMV BLD AUTO: 9.3 FL (ref 9.4–12.4)
POTASSIUM SERPL-SCNC: 3.2 MEQ/L (ref 3.5–5.2)
RBC # BLD AUTO: 3.2 MILL/MM3 (ref 4.7–6.1)
WBC # BLD AUTO: 6.9 THOU/MM3 (ref 4.8–10.8)

## 2024-07-12 PROCEDURE — 99024 POSTOP FOLLOW-UP VISIT: CPT | Performed by: NURSE PRACTITIONER

## 2024-07-12 PROCEDURE — 2580000003 HC RX 258: Performed by: NURSE PRACTITIONER

## 2024-07-12 PROCEDURE — 6370000000 HC RX 637 (ALT 250 FOR IP): Performed by: NURSE PRACTITIONER

## 2024-07-12 PROCEDURE — 84132 ASSAY OF SERUM POTASSIUM: CPT

## 2024-07-12 PROCEDURE — APPSS30 APP SPLIT SHARED TIME 16-30 MINUTES: Performed by: NURSE PRACTITIONER

## 2024-07-12 PROCEDURE — 85027 COMPLETE CBC AUTOMATED: CPT

## 2024-07-12 PROCEDURE — 6360000002 HC RX W HCPCS: Performed by: NURSE PRACTITIONER

## 2024-07-12 PROCEDURE — 36415 COLL VENOUS BLD VENIPUNCTURE: CPT

## 2024-07-12 PROCEDURE — 1200000003 HC TELEMETRY R&B

## 2024-07-12 PROCEDURE — 2580000003 HC RX 258: Performed by: SURGERY

## 2024-07-12 RX ADMIN — NALOXEGOL OXALATE 12.5 MG: 12.5 TABLET, FILM COATED ORAL at 07:34

## 2024-07-12 RX ADMIN — PIPERACILLIN AND TAZOBACTAM 3375 MG: 3; .375 INJECTION, POWDER, FOR SOLUTION INTRAVENOUS at 22:08

## 2024-07-12 RX ADMIN — GABAPENTIN 300 MG: 300 CAPSULE ORAL at 13:46

## 2024-07-12 RX ADMIN — HYDROMORPHONE HYDROCHLORIDE 0.25 MG: 1 INJECTION, SOLUTION INTRAMUSCULAR; INTRAVENOUS; SUBCUTANEOUS at 02:19

## 2024-07-12 RX ADMIN — AMLODIPINE BESYLATE 10 MG: 10 TABLET ORAL at 08:51

## 2024-07-12 RX ADMIN — HYDROMORPHONE HYDROCHLORIDE 0.25 MG: 1 INJECTION, SOLUTION INTRAMUSCULAR; INTRAVENOUS; SUBCUTANEOUS at 15:08

## 2024-07-12 RX ADMIN — OXYCODONE HYDROCHLORIDE AND ACETAMINOPHEN 1 TABLET: 5; 325 TABLET ORAL at 13:44

## 2024-07-12 RX ADMIN — DOXEPIN HYDROCHLORIDE 10 MG: 10 CAPSULE ORAL at 20:49

## 2024-07-12 RX ADMIN — PANTOPRAZOLE SODIUM 40 MG: 40 INJECTION, POWDER, FOR SOLUTION INTRAVENOUS at 08:53

## 2024-07-12 RX ADMIN — GABAPENTIN 300 MG: 300 CAPSULE ORAL at 08:51

## 2024-07-12 RX ADMIN — Medication 4.5 MG: at 20:48

## 2024-07-12 RX ADMIN — BACLOFEN 10 MG: 10 TABLET ORAL at 20:48

## 2024-07-12 RX ADMIN — HYDROMORPHONE HYDROCHLORIDE 0.25 MG: 1 INJECTION, SOLUTION INTRAMUSCULAR; INTRAVENOUS; SUBCUTANEOUS at 23:51

## 2024-07-12 RX ADMIN — PIPERACILLIN AND TAZOBACTAM 3375 MG: 3; .375 INJECTION, POWDER, FOR SOLUTION INTRAVENOUS at 13:52

## 2024-07-12 RX ADMIN — SODIUM CHLORIDE, PRESERVATIVE FREE 10 ML: 5 INJECTION INTRAVENOUS at 08:53

## 2024-07-12 RX ADMIN — POTASSIUM CHLORIDE 40 MEQ: 1500 TABLET, EXTENDED RELEASE ORAL at 14:18

## 2024-07-12 RX ADMIN — DOXEPIN HYDROCHLORIDE 10 MG: 10 CAPSULE ORAL at 00:39

## 2024-07-12 RX ADMIN — OXYCODONE HYDROCHLORIDE AND ACETAMINOPHEN 1 TABLET: 5; 325 TABLET ORAL at 22:01

## 2024-07-12 RX ADMIN — LEVOTHYROXINE SODIUM 50 MCG: 0.05 TABLET ORAL at 05:07

## 2024-07-12 RX ADMIN — OXYCODONE HYDROCHLORIDE AND ACETAMINOPHEN 1 TABLET: 5; 325 TABLET ORAL at 08:52

## 2024-07-12 RX ADMIN — Medication 1 TABLET: at 08:51

## 2024-07-12 RX ADMIN — HYDROMORPHONE HYDROCHLORIDE 0.25 MG: 1 INJECTION, SOLUTION INTRAMUSCULAR; INTRAVENOUS; SUBCUTANEOUS at 20:48

## 2024-07-12 RX ADMIN — SODIUM CHLORIDE, POTASSIUM CHLORIDE, SODIUM LACTATE AND CALCIUM CHLORIDE: 600; 310; 30; 20 INJECTION, SOLUTION INTRAVENOUS at 20:42

## 2024-07-12 RX ADMIN — HYDROCHLOROTHIAZIDE 25 MG: 25 TABLET ORAL at 08:51

## 2024-07-12 RX ADMIN — GABAPENTIN 300 MG: 300 CAPSULE ORAL at 20:49

## 2024-07-12 RX ADMIN — Medication 100 MG: at 08:51

## 2024-07-12 RX ADMIN — BACLOFEN 10 MG: 10 TABLET ORAL at 08:51

## 2024-07-12 RX ADMIN — HYDROMORPHONE HYDROCHLORIDE 0.25 MG: 1 INJECTION, SOLUTION INTRAMUSCULAR; INTRAVENOUS; SUBCUTANEOUS at 10:47

## 2024-07-12 RX ADMIN — FOLIC ACID 1 MG: 1 TABLET ORAL at 08:51

## 2024-07-12 RX ADMIN — BACLOFEN 10 MG: 10 TABLET ORAL at 13:46

## 2024-07-12 RX ADMIN — OXYCODONE HYDROCHLORIDE AND ACETAMINOPHEN 1 TABLET: 5; 325 TABLET ORAL at 18:04

## 2024-07-12 RX ADMIN — HYDROMORPHONE HYDROCHLORIDE 0.25 MG: 1 INJECTION, SOLUTION INTRAMUSCULAR; INTRAVENOUS; SUBCUTANEOUS at 05:07

## 2024-07-12 RX ADMIN — PIPERACILLIN AND TAZOBACTAM 4500 MG: 4; .5 INJECTION, POWDER, LYOPHILIZED, FOR SOLUTION INTRAVENOUS at 07:32

## 2024-07-12 ASSESSMENT — PAIN SCALES - GENERAL
PAINLEVEL_OUTOF10: 10
PAINLEVEL_OUTOF10: 7
PAINLEVEL_OUTOF10: 6
PAINLEVEL_OUTOF10: 8
PAINLEVEL_OUTOF10: 7
PAINLEVEL_OUTOF10: 8
PAINLEVEL_OUTOF10: 6
PAINLEVEL_OUTOF10: 7
PAINLEVEL_OUTOF10: 8
PAINLEVEL_OUTOF10: 6
PAINLEVEL_OUTOF10: 7
PAINLEVEL_OUTOF10: 9

## 2024-07-12 ASSESSMENT — PAIN DESCRIPTION - ORIENTATION
ORIENTATION: RIGHT
ORIENTATION: RIGHT
ORIENTATION: LOWER

## 2024-07-12 ASSESSMENT — PAIN DESCRIPTION - DESCRIPTORS
DESCRIPTORS: ACHING

## 2024-07-12 ASSESSMENT — PAIN - FUNCTIONAL ASSESSMENT
PAIN_FUNCTIONAL_ASSESSMENT: ACTIVITIES ARE NOT PREVENTED

## 2024-07-12 ASSESSMENT — PAIN DESCRIPTION - LOCATION
LOCATION: ABDOMEN
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
## 2024-07-12 NOTE — CARE COORDINATION
7/12/24, 2:07 PM EDT    DISCHARGE ON GOING EVALUATION    Anirudh Burns [facility] day: 4  Location: -14/014-A Reason for admit: Colostomy in place (HCC) [Z93.3]  Post-operative state [Z98.890]     Procedures:   7/08 Takedown Colostomy- attempted. Complications: stapler mis firing with unsuccessful anastamosis, decision to replace ostomy.       Imaging since last note: none     Barriers to Discharge: General surgery following. POD 4. Colostomy care- awaiting return of bowel function. MARICARMEN drain (10ml out in 24hrs). Advanced to regular diet. Continues to have bloody mucous rectal discharge. Hgb 10.8 (11.6) K 3.2- notified RN of replacement protocol. Repeat labs in AM. IVF decreased to 50ml/hr. IV dilaudid prn. Percocet prn. IV zosyn q8h.     PCP: Nancy Joseph MD  Readmission Risk Score: 13.2    Patient Goals/Plan/Treatment Preferences: Met w/ [facility]. Plans to return home w/ his son. Resume [facility], new HH orders obtained. Notifed HH on potential weekend discharge. [facility] plans to have family  BSC from DME provider of choice, provided with order and medical necessity note.       7/12/24, 2:07 PM EDT  Potential weekend discharge   Patient goals/plan/ treatment preferences discussed by  and .  Patient goals/plan/ treatment preferences reviewed with patient/ family.  Patient/ family verbalize understanding of discharge plan and are in agreement with goal/plan/treatment preferences.  Understanding was demonstrated using the teach back method.  AVS provided by RN at time of discharge, which includes all necessary medical information pertaining to the patients current course of illness, treatment, post-discharge goals of care, and treatment preferences.     Services At/After Discharge: DME, Home Health, and Nursing service       Weekend discharge instructions for HH on hard chart

## 2024-07-12 NOTE — PLAN OF CARE
Problem: Discharge Planning  Goal: Discharge to home or other facility with appropriate resources  7/12/2024 0934 by Lola Zhao RN  Outcome: Progressing  Flowsheets (Taken 7/12/2024 0848)  Discharge to home or other facility with appropriate resources:   Identify barriers to discharge with patient and caregiver   Arrange for needed discharge resources and transportation as appropriate   Identify discharge learning needs (meds, wound care, etc)     Problem: Pain  Goal: Verbalizes/displays adequate comfort level or baseline comfort level  7/12/2024 0934 by Lola Zhao RN  Outcome: Progressing  Pain decreases with rest, repositioning, ice application, and prn pain medications.    Problem: Safety - Adult  Goal: Free from fall injury  7/12/2024 0934 by Lloa Zhao RN  Outcome: Progressing  Falling star prevention in place. Bed and chair alarms in use. Call light in reach. Purposeful hourly rounding.    Problem: Skin/Tissue Integrity  Goal: Absence of new skin breakdown  Description: 1.  Monitor for areas of redness and/or skin breakdown  2.  Assess vascular access sites hourly  3.  Every 4-6 hours minimum:  Change oxygen saturation probe site  4.  Every 4-6 hours:  If on nasal continuous positive airway pressure, respiratory therapy assess nares and determine need for appliance change or resting period.  7/12/2024 0934 by Lola Zhao RN  Outcome: Progressing     Problem: Skin/Tissue Integrity - Adult  Goal: Skin integrity remains intact  7/12/2024 0934 by Lola Zhao RN  Outcome: Progressing  Flowsheets (Taken 7/12/2024 0848)  Skin Integrity Remains Intact:   Monitor for areas of redness and/or skin breakdown   Assess vascular access sites hourly     Problem: Skin/Tissue Integrity - Adult  Goal: Incisions, wounds, or drain sites healing without S/S of infection  7/12/2024 0934 by Lola Zhao RN  Outcome: Progressing  Flowsheets (Taken 7/12/2024 0848)  Incisions,

## 2024-07-12 NOTE — PLAN OF CARE
Problem: Discharge Planning  Goal: Discharge to home or other facility with appropriate resources  7/12/2024 0029 by Nanette Camacho RN  Outcome: Progressing  Flowsheets (Taken 7/12/2024 0029)  Discharge to home or other facility with appropriate resources:   Identify barriers to discharge with patient and caregiver   Arrange for needed discharge resources and transportation as appropriate   Identify discharge learning needs (meds, wound care, etc)     Problem: Pain  Goal: Verbalizes/displays adequate comfort level or baseline comfort level  7/12/2024 0029 by Nanette Camacho RN  Outcome: Progressing  Flowsheets (Taken 7/12/2024 0029)  Verbalizes/displays adequate comfort level or baseline comfort level:   Encourage patient to monitor pain and request assistance   Assess pain using appropriate pain scale   Administer analgesics based on type and severity of pain and evaluate response     Problem: Safety - Adult  Goal: Free from fall injury  7/12/2024 0029 by Nanette Camacho RN  Outcome: Progressing  Flowsheets (Taken 7/12/2024 0029)  Free From Fall Injury: Instruct family/caregiver on patient safety  Note: Patient bed alarm on at this time.     Problem: Skin/Tissue Integrity  Goal: Absence of new skin breakdown  Description: 1.  Monitor for areas of redness and/or skin breakdown  2.  Assess vascular access sites hourly  3.  Every 4-6 hours minimum:  Change oxygen saturation probe site  4.  Every 4-6 hours:  If on nasal continuous positive airway pressure, respiratory therapy assess nares and determine need for appliance change or resting period.  7/12/2024 0029 by Nanette Camacho RN  Outcome: Progressing  Note: Patient able to reposition self at this time.     Problem: Skin/Tissue Integrity - Adult  Goal: Skin integrity remains intact  7/12/2024 0029 by Nanette Camacho RN  Outcome: Progressing  Flowsheets (Taken 7/12/2024 0029)  Skin Integrity Remains Intact: Monitor for areas of redness and/or skin  Progressing  Flowsheets (Taken 7/12/2024 0029)  Electrolytes maintained within normal limits:   Monitor labs and assess patient for signs and symptoms of electrolyte imbalances   Administer electrolyte replacement as ordered     Care plan reviewed with patient.  Patient verbalizes understanding of the plan of care and contributes to goal setting.

## 2024-07-12 NOTE — DISCHARGE INSTRUCTIONS
IMMEDIATE ATTENTION, GO TO THE EMERGENCY ROOM AND YOUR DOCTOR WILL BE CONTACTED.    Prepared By:  FRANCE Lin - CNP CNP  For Lucero Gallagher MD    Electronically signed 7/12/2024 at 10:31 AM

## 2024-07-13 VITALS
HEART RATE: 108 BPM | OXYGEN SATURATION: 97 % | WEIGHT: 168 LBS | DIASTOLIC BLOOD PRESSURE: 68 MMHG | TEMPERATURE: 98.5 F | SYSTOLIC BLOOD PRESSURE: 123 MMHG | HEIGHT: 66 IN | RESPIRATION RATE: 16 BRPM | BODY MASS INDEX: 27 KG/M2

## 2024-07-13 LAB
ANION GAP SERPL CALC-SCNC: 10 MEQ/L (ref 8–16)
BUN SERPL-MCNC: 5 MG/DL (ref 7–22)
CALCIUM SERPL-MCNC: 8.4 MG/DL (ref 8.5–10.5)
CHLORIDE SERPL-SCNC: 100 MEQ/L (ref 98–111)
CO2 SERPL-SCNC: 28 MEQ/L (ref 23–33)
CREAT SERPL-MCNC: 0.6 MG/DL (ref 0.4–1.2)
GFR SERPL CREATININE-BSD FRML MDRD: > 90 ML/MIN/1.73M2
GLUCOSE SERPL-MCNC: 89 MG/DL (ref 70–108)
HCT VFR BLD AUTO: 35.7 % (ref 42–52)
HGB BLD-MCNC: 12 GM/DL (ref 14–18)
POTASSIUM SERPL-SCNC: 3.5 MEQ/L (ref 3.5–5.2)
SODIUM SERPL-SCNC: 138 MEQ/L (ref 135–145)

## 2024-07-13 PROCEDURE — 85014 HEMATOCRIT: CPT

## 2024-07-13 PROCEDURE — 2580000003 HC RX 258: Performed by: NURSE PRACTITIONER

## 2024-07-13 PROCEDURE — 6370000000 HC RX 637 (ALT 250 FOR IP): Performed by: NURSE PRACTITIONER

## 2024-07-13 PROCEDURE — 6360000002 HC RX W HCPCS: Performed by: NURSE PRACTITIONER

## 2024-07-13 PROCEDURE — 85018 HEMOGLOBIN: CPT

## 2024-07-13 PROCEDURE — 80048 BASIC METABOLIC PNL TOTAL CA: CPT

## 2024-07-13 PROCEDURE — 36415 COLL VENOUS BLD VENIPUNCTURE: CPT

## 2024-07-13 PROCEDURE — 99024 POSTOP FOLLOW-UP VISIT: CPT | Performed by: SURGERY

## 2024-07-13 RX ORDER — DOXEPIN HYDROCHLORIDE 10 MG/1
10 CAPSULE ORAL NIGHTLY PRN
Qty: 5 CAPSULE | Refills: 0 | Status: SHIPPED | OUTPATIENT
Start: 2024-07-13 | End: 2024-07-18

## 2024-07-13 RX ORDER — LANOLIN ALCOHOL/MO/W.PET/CERES
4.5 CREAM (GRAM) TOPICAL NIGHTLY
Qty: 15 TABLET | Refills: 0 | Status: SHIPPED | OUTPATIENT
Start: 2024-07-13

## 2024-07-13 RX ORDER — OXYCODONE HYDROCHLORIDE AND ACETAMINOPHEN 5; 325 MG/1; MG/1
1 TABLET ORAL EVERY 6 HOURS PRN
Qty: 28 TABLET | Refills: 0 | Status: SHIPPED | OUTPATIENT
Start: 2024-07-13 | End: 2024-07-20

## 2024-07-13 RX ORDER — AMOXICILLIN AND CLAVULANATE POTASSIUM 875; 125 MG/1; MG/1
1 TABLET, FILM COATED ORAL 2 TIMES DAILY
Qty: 14 TABLET | Refills: 0 | Status: SHIPPED | OUTPATIENT
Start: 2024-07-13 | End: 2024-07-20

## 2024-07-13 RX ORDER — CLONIDINE 0.2 MG/24H
1 PATCH, EXTENDED RELEASE TRANSDERMAL WEEKLY
Qty: 4 PATCH | Refills: 0 | Status: SHIPPED | OUTPATIENT
Start: 2024-07-16

## 2024-07-13 RX ADMIN — FERROUS SULFATE TAB 325 MG (65 MG ELEMENTAL FE) 325 MG: 325 (65 FE) TAB at 08:49

## 2024-07-13 RX ADMIN — GABAPENTIN 300 MG: 300 CAPSULE ORAL at 08:49

## 2024-07-13 RX ADMIN — OXYCODONE HYDROCHLORIDE AND ACETAMINOPHEN 1 TABLET: 5; 325 TABLET ORAL at 06:23

## 2024-07-13 RX ADMIN — HYDROCHLOROTHIAZIDE 25 MG: 25 TABLET ORAL at 08:49

## 2024-07-13 RX ADMIN — Medication 100 MG: at 08:49

## 2024-07-13 RX ADMIN — PANTOPRAZOLE SODIUM 40 MG: 40 INJECTION, POWDER, FOR SOLUTION INTRAVENOUS at 08:53

## 2024-07-13 RX ADMIN — HYDROMORPHONE HYDROCHLORIDE 0.25 MG: 1 INJECTION, SOLUTION INTRAMUSCULAR; INTRAVENOUS; SUBCUTANEOUS at 03:17

## 2024-07-13 RX ADMIN — FOLIC ACID 1 MG: 1 TABLET ORAL at 08:50

## 2024-07-13 RX ADMIN — PIPERACILLIN AND TAZOBACTAM 3375 MG: 3; .375 INJECTION, POWDER, FOR SOLUTION INTRAVENOUS at 06:22

## 2024-07-13 RX ADMIN — AMLODIPINE BESYLATE 10 MG: 10 TABLET ORAL at 08:49

## 2024-07-13 RX ADMIN — OXYCODONE HYDROCHLORIDE AND ACETAMINOPHEN 1 TABLET: 5; 325 TABLET ORAL at 02:04

## 2024-07-13 RX ADMIN — OXYCODONE HYDROCHLORIDE AND ACETAMINOPHEN 1 TABLET: 5; 325 TABLET ORAL at 10:28

## 2024-07-13 RX ADMIN — LEVOTHYROXINE SODIUM 50 MCG: 0.05 TABLET ORAL at 06:23

## 2024-07-13 RX ADMIN — BACLOFEN 10 MG: 10 TABLET ORAL at 08:49

## 2024-07-13 RX ADMIN — Medication 1 TABLET: at 08:49

## 2024-07-13 ASSESSMENT — PAIN - FUNCTIONAL ASSESSMENT: PAIN_FUNCTIONAL_ASSESSMENT: ACTIVITIES ARE NOT PREVENTED

## 2024-07-13 ASSESSMENT — PAIN DESCRIPTION - DESCRIPTORS
DESCRIPTORS: ACHING
DESCRIPTORS: ACHING;DISCOMFORT
DESCRIPTORS: SHARP;CRAMPING;DISCOMFORT

## 2024-07-13 ASSESSMENT — PAIN SCALES - GENERAL
PAINLEVEL_OUTOF10: 7
PAINLEVEL_OUTOF10: 7
PAINLEVEL_OUTOF10: 6
PAINLEVEL_OUTOF10: 6
PAINLEVEL_OUTOF10: 7
PAINLEVEL_OUTOF10: 5
PAINLEVEL_OUTOF10: 7
PAINLEVEL_OUTOF10: 6

## 2024-07-13 ASSESSMENT — PAIN DESCRIPTION - LOCATION
LOCATION: ABDOMEN

## 2024-07-13 ASSESSMENT — PAIN DESCRIPTION - ORIENTATION
ORIENTATION: RIGHT;LEFT
ORIENTATION: LOWER

## 2024-07-13 NOTE — PROGRESS NOTES
Discharge education and instructions provided. Patient verbalized understanding at this time. No questions asked. IV access removed. All personal belongings, AVS present with patient. Transport to main lobby via wheelchair.

## 2024-07-13 NOTE — PROCEDURES
MARICARMEN drain removed without difficulty, clean nonstick dressing in place. No signs of infection or bleeding.

## 2024-07-13 NOTE — PROGRESS NOTES
[facility]  General Surgery - Bhavik Wild MD  Covering for Dr Lucero Gallagher  Postoperative Progress Note  Pt Name: Anirudh Segal  Medical Record Number: [id number]  Date of Birth 1980   Today's Date: 7/13/2024  ASSESSMENT   POD # 5 S/p  Takedown Colostomy- attempted, unsuccessful takedown  Postoperative pain,  abdominal distention slight improvement  Tolerating diet ostomy functioning well  Acute postoperative blood loss anemia hgb stable   Rectal bleeding with thick mucous discharge is improving, appears to be large perirectal hematoma   No bowel function, bowel sweat noted in ostomy bag  function   Drain only had 10 cc out past 24 hours  PLANS   Tolerating diet colostomy functioning is not a new colostomy for him as he knows how to care for it drain very little be removed hemoglobin stable will discharge today after drain removed on oral antibiotics  Has requested something for sleep which was started here I will give him 5 days worth and leave it to his primary surgeon to decide whether to continue or not after that  IV antibiotics will send home on oral- perirectal swelling/tenderness suspect hematoma   Will need outpatient referral to [facility] to talk about reversal  Okay to discharge when bowel function returns if medically stable   SUBJECTIVE   Anirudh is doing okay. He is tolerating a regular diet, denies abdominal distention or N&V.  Ostomy functioning well. Swelling perirectal and tender, blood tinged mucous discharge this am. Chart has been reviewed and updated by Primary RN   CURRENT MEDICATIONS   Scheduled Meds:   naloxegol  12.5 mg Oral QAM AC    piperacillin-tazobactam (ZOSYN) 3,375 mg in sodium chloride 0.9 % 50 mL extended IVPB (mini-bag)  3,375 mg IntraVENous Q8H    polyethylene glycol  17 g Oral Daily    pantoprazole  40 mg IntraVENous Daily    sodium chloride flush  5-40 mL IntraVENous 2 times per day    [Held by provider] enoxaparin  40 mg SubCUTAneous Daily

## 2024-07-13 NOTE — PLAN OF CARE
Problem: Discharge Planning  Goal: Discharge to home or other facility with appropriate resources  7/12/2024 2228 by Ashwini Gross RN  Outcome: Progressing  Flowsheets (Taken 7/12/2024 2042)  Discharge to home or other facility with appropriate resources:   Identify barriers to discharge with patient and caregiver   Arrange for needed discharge resources and transportation as appropriate   Identify discharge learning needs (meds, wound care, etc)   Arrange for interpreters to assist at discharge as needed   Refer to discharge planning if patient needs post-hospital services based on physician order or complex needs related to functional status, cognitive ability or social support system     Problem: Pain  Goal: Verbalizes/displays adequate comfort level or baseline comfort level  7/12/2024 2228 by Ashwini Gross RN  Outcome: Progressing  Patient states pain relief from PRN pain medications. Pain reassessed one hour post PRN pain medication given.     Problem: Safety - Adult  Goal: Free from fall injury  7/12/2024 2228 by Ashwini Gross RN  Outcome: Progressing   Patient remains injury free. All safety precautions in place     Problem: Skin/Tissue Integrity  Goal: Absence of new skin breakdown  Description: 1.  Monitor for areas of redness and/or skin breakdown  2.  Assess vascular access sites hourly  3.  Every 4-6 hours minimum:  Change oxygen saturation probe site  4.  Every 4-6 hours:  If on nasal continuous positive airway pressure, respiratory therapy assess nares and determine need for appliance change or resting period.  7/12/2024 2228 by Ashwini Gross RN  Outcome: Progressing   Patient is reminded of the importance to reposition every 2 hours or as needed.     Problem: Skin/Tissue Integrity - Adult  Goal: Skin integrity remains intact  7/12/2024 2228 by Ashwini Gross, RN  Outcome: Progressing  Flowsheets (Taken 7/12/2024 2042)  Skin Integrity Remains Intact:   Monitor for areas of redness and/or skin  7/12/2024 2042)  Absence of infection at discharge:   Assess and monitor for signs and symptoms of infection   Monitor lab/diagnostic results   Monitor all insertion sites i.e., indwelling lines, tubes and drains     Problem: Metabolic/Fluid and Electrolytes - Adult  Goal: Electrolytes maintained within normal limits  7/12/2024 2228 by Ashwini Gross RN  Outcome: Progressing  Flowsheets (Taken 7/12/2024 2042)  Electrolytes maintained within normal limits:   Monitor labs and assess patient for signs and symptoms of electrolyte imbalances   Administer electrolyte replacement as ordered   Care plan reviewed with patient.  Patient verbalize understanding of the plan of care and contribute to goal setting.

## 2024-07-15 ENCOUNTER — TELEPHONE (OUTPATIENT)
Dept: FAMILY MEDICINE CLINIC | Age: 44
End: 2024-07-15

## 2024-07-15 NOTE — TELEPHONE ENCOUNTER
Care Transitions Initial Follow Up Call    Outreach made within 2 business days of discharge: Yes    Patient: Anirudh Segal Patient : 1980   MRN: [id number]  Reason for Admission: There are no discharge diagnoses documented for the most recent discharge.  Discharge Date: 24       Spoke with: Anirudh, patient     Discharge department/facility: [facility]    TCM Interactive Patient Contact:  Was patient able to fill all prescriptions: Yes  Was patient instructed to bring all medications to the follow-up visit: Yes  Is patient taking all medications as directed in the discharge summary? Yes  Does patient understand their discharge instructions: Yes  Does patient have questions or concerns that need addressed prior to 7-14 day follow up office visit: no    Scheduled appointment with PCP within 7-14 days    Follow Up  No future appointments.    Nicole Camacho MA

## 2024-07-17 ENCOUNTER — TELEPHONE (OUTPATIENT)
Dept: WOUND CARE | Age: 44
End: 2024-07-17

## 2024-07-18 ENCOUNTER — OFFICE VISIT (OUTPATIENT)
Dept: FAMILY MEDICINE CLINIC | Age: 44
End: 2024-07-18

## 2024-07-18 VITALS
DIASTOLIC BLOOD PRESSURE: 84 MMHG | OXYGEN SATURATION: 98 % | HEIGHT: 66 IN | WEIGHT: 161.2 LBS | SYSTOLIC BLOOD PRESSURE: 126 MMHG | BODY MASS INDEX: 25.91 KG/M2 | TEMPERATURE: 98.2 F | RESPIRATION RATE: 12 BRPM | HEART RATE: 105 BPM

## 2024-07-18 DIAGNOSIS — Z43.3 COLOSTOMY CARE (HCC): Primary | ICD-10-CM

## 2024-07-18 SDOH — ECONOMIC STABILITY: HOUSING INSECURITY
IN THE LAST 12 MONTHS, WAS THERE A TIME WHEN YOU DID NOT HAVE A STEADY PLACE TO SLEEP OR SLEPT IN A SHELTER (INCLUDING NOW)?: NO

## 2024-07-18 SDOH — ECONOMIC STABILITY: FOOD INSECURITY: WITHIN THE PAST 12 MONTHS, YOU WORRIED THAT YOUR FOOD WOULD RUN OUT BEFORE YOU GOT MONEY TO BUY MORE.: NEVER TRUE

## 2024-07-18 SDOH — ECONOMIC STABILITY: FOOD INSECURITY: WITHIN THE PAST 12 MONTHS, THE FOOD YOU BOUGHT JUST DIDN'T LAST AND YOU DIDN'T HAVE MONEY TO GET MORE.: NEVER TRUE

## 2024-07-18 SDOH — ECONOMIC STABILITY: INCOME INSECURITY: HOW HARD IS IT FOR YOU TO PAY FOR THE VERY BASICS LIKE FOOD, HOUSING, MEDICAL CARE, AND HEATING?: SOMEWHAT HARD

## 2024-07-18 NOTE — PROGRESS NOTES
Health Maintenance Due   Topic Date Due    Hepatitis B vaccine (1 of 3 - 3-dose series) Never done    COVID-19 Vaccine (1) Never done    Varicella vaccine (1 of 2 - 2-dose childhood series) Never done    HIV screen  Never done    Hepatitis C screen  Never done    Pneumococcal 0-64 years Vaccine (2 of 2 - PCV) 09/13/2018    Lipids  Never done

## 2024-07-18 NOTE — PROGRESS NOTES
Anirudh Segal is a 43 y.o. male who presents today for:  Chief Complaint   Patient presents with    Follow-Up from Hospital     [facility] Follow-up d/c 07/13/2024     HPI:   Anirudh Segal is 43 y.o. who presents today for hospital follow up visit.    Pt recently discharged from hospital on 7/13/24 -- admitted for reversal of Colostomy.  Per op note, takedown colostomy attempted, however not completed due to unsuccessful anastomosis, for which decision was made to replace ostomy.  D/C'ed on 7/13 in stable conditions -- noted to have perirectal hematoma, as well as recommended to follow up with [facility] for further care.  No further gen surg post-op noted on file.    Today, pt denies any complaints and is feeling better since discharge.  However, wants to undergo procedure for reversal of colostomy.  Denies any abdominal pain, nausea, vomiting, diarrhea, or any other complaints at this time.      Objective:     Vitals:    07/18/24 1040   BP: 126/84   Site: Left Upper Arm   Position: Sitting   Cuff Size: Medium Adult   Pulse: (!) 105   Resp: 12   Temp: 98.2 °F (36.8 °C)   TempSrc: Oral   SpO2: 98%   Weight: 73.1 kg (161 lb 3.2 oz)   Height: 1.676 m (5' 5.98\")       Wt Readings from Last 3 Encounters:   07/18/24 73.1 kg (161 lb 3.2 oz)   07/08/24 76.2 kg (168 lb)   05/28/24 76.3 kg (168 lb 4.8 oz)       BP Readings from Last 3 Encounters:   07/18/24 126/84   07/13/24 123/68   05/28/24 138/88       Lab Results   Component Value Date    WBC 6.9 07/12/2024    HGB 12.0 (L) 07/13/2024    HCT 35.7 (L) 07/13/2024    .3 (H) 07/12/2024     07/12/2024     Lab Results   Component Value Date     07/13/2024    K 3.5 07/13/2024     07/13/2024    CO2 28 07/13/2024    BUN 5 (L) 07/13/2024    CREATININE 0.6 07/13/2024    GLUCOSE 89 07/13/2024    CALCIUM 8.4 (L) 07/13/2024    BILITOT 0.2 (L) 11/13/2023    ALKPHOS 297 (H) 11/13/2023    AST 31 11/13/2023    ALT 45 11/13/2023    LABGLOM > 90

## 2024-07-18 NOTE — PATIENT INSTRUCTIONS
Thank you   Thank you for trusting us with your healthcare needs. You may receive a survey regarding today's visit. It would help us out if you would take a few moments to provide your feedback. We value your input.  Please bring in ALL medications BOTTLES, including inhalers, herbal supplements, over the counter, prescribed & non-prescribed medicine. The office would like actual medication bottles and a list.         4.  Prior to getting your labs drawn, check with your insurance company for benefits and eligibility of lab services.  Often, insurance companies cover certain tests for preventative visits only.  It is patient's responsibility to    see what is covered.    5.  If the list below has been completed, PLEASE FAX RECORDS TO OUR OFFICE @ [phone]. Once the records have been received we will update your records at our office:  Health Maintenance Due   Topic Date Due    Hepatitis B vaccine (1 of 3 - 3-dose series) Never done    COVID-19 Vaccine (1) Never done    Varicella vaccine (1 of 2 - 2-dose childhood series) Never done    HIV screen  Never done    Hepatitis C screen  Never done    Pneumococcal 0-64 years Vaccine (2 of 2 - PCV) 09/13/2018    Lipids  Never done

## 2024-07-19 NOTE — PROGRESS NOTES
Attending Physician Statement  I have discussed the case, including pertinent history and exam findings with the resident. I also have seen the patient and performed key portions of the examination.  I agree with the documented assessment and plan as documented by the resident.  GC modifier added to this encounter      Nancy Joseph MD 7/19/2024 12:51 PM

## 2024-07-25 ENCOUNTER — OFFICE VISIT (OUTPATIENT)
Dept: SURGERY | Age: 44
End: 2024-07-25

## 2024-07-25 VITALS
TEMPERATURE: 98 F | WEIGHT: 159 LBS | OXYGEN SATURATION: 97 % | DIASTOLIC BLOOD PRESSURE: 82 MMHG | HEART RATE: 120 BPM | BODY MASS INDEX: 25.55 KG/M2 | SYSTOLIC BLOOD PRESSURE: 134 MMHG | HEIGHT: 66 IN

## 2024-07-25 DIAGNOSIS — Z93.3 COLOSTOMY IN PLACE (HCC): Primary | ICD-10-CM

## 2024-07-25 PROCEDURE — 99024 POSTOP FOLLOW-UP VISIT: CPT | Performed by: SURGERY

## 2024-07-25 NOTE — PROGRESS NOTES
[facility]  General Surgery  Clinic Post op Note    Pt Name: Anirudh Segal  Medical Record Number: [id number]  Date of Birth 1980   Today's Date: 07/25/2024      ASSESSMENT       ICD-10-CM    1. Colostomy in place (HCC)  Z93.3            PLAN   Patient well, had some rectal drainage which is now stopped.  His ostomy is functioning he is tolerating a diet.  I discussed with him again findings intraoperatively and inability to safely reverse him.  I referred him to a tertiary care center at [facility] by colorectal surgeons for reevaluation.  He is happy with this plan.  I also reviewed with him the want a wait least 6 weeks before trying to reverse him.  He expressed understanding although he was disappointed  SUBJECTIVE   Anirudh is doing well, tolerating diet having bowel movements.  His ostomy is functioning.  He did have some drainage from his rectum but it is slowly decreasing over time..           CURRENT MEDICATIONS     Current Outpatient Medications on File Prior to Visit   Medication Sig Dispense Refill    doxepin (SINEQUAN) 10 MG capsule Take 1 capsule by mouth nightly as needed (sleep) 5 capsule 0    cloNIDine (CATAPRES) 0.2 MG/24HR PTWK Place 1 patch onto the skin once a week 4 patch 0    melatonin 3 MG TABS tablet Take 1.5 tablets by mouth nightly 15 tablet 0    hydrOXYzine HCl (ATARAX) 25 MG tablet Take 1 tablet by mouth nightly      ibuprofen (ADVIL;MOTRIN) 800 MG tablet take 1 tablet by mouth once daily if needed for pain      polyethylene glycol (GLYCOLAX) 17 GM/SCOOP powder take 17GM (DISSOLVED IN WATER) by mouth once daily if needed for constipation      acetaminophen (TYLENOL) 325 MG tablet Take 2 tablets by mouth every 6 hours as needed for Pain      amLODIPine (NORVASC) 10 MG tablet 1 tablet by Per G Tube route daily 30 tablet 0    doxazosin (CARDURA) 1 MG tablet 1 tablet by Per G Tube route at bedtime 30 tablet 3    ferrous sulfate (IRON 325)

## 2024-07-29 ENCOUNTER — TELEPHONE (OUTPATIENT)
Dept: SURGERY | Age: 44
End: 2024-07-29

## 2024-08-01 ENCOUNTER — HOSPITAL ENCOUNTER (OUTPATIENT)
Dept: WOUND CARE | Age: 44
Discharge: HOME | End: 2024-08-01
Attending: NURSE PRACTITIONER
Payer: MEDICAID

## 2024-08-01 VITALS
RESPIRATION RATE: 16 BRPM | HEART RATE: 125 BPM | TEMPERATURE: 98.4 F | OXYGEN SATURATION: 97 % | SYSTOLIC BLOOD PRESSURE: 144 MMHG | DIASTOLIC BLOOD PRESSURE: 84 MMHG

## 2024-08-01 DIAGNOSIS — Z43.3 COLOSTOMY CARE (HCC): Primary | ICD-10-CM

## 2024-08-01 DIAGNOSIS — Z71.89 ENCOUNTER FOR OSTOMY CARE EDUCATION: ICD-10-CM

## 2024-08-01 PROBLEM — I77.74 VERTEBRAL ARTERY DISSECTION (HCC): Status: RESOLVED | Noted: 2017-05-06 | Resolved: 2024-08-01

## 2024-08-01 PROBLEM — S00.81XA FOREHEAD ABRASION: Status: RESOLVED | Noted: 2017-05-06 | Resolved: 2024-08-01

## 2024-08-01 PROBLEM — I61.9 BRAIN BLEED (HCC): Status: RESOLVED | Noted: 2023-10-09 | Resolved: 2024-08-01

## 2024-08-01 PROBLEM — S12.9XXA CERVICAL SPINE FRACTURE (HCC): Status: RESOLVED | Noted: 2017-05-06 | Resolved: 2024-08-01

## 2024-08-01 PROBLEM — T14.8XXA MUSCLE CONTUSION: Status: RESOLVED | Noted: 2017-05-11 | Resolved: 2024-08-01
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
## 2024-08-01 PROBLEM — Z98.890 POST-OPERATIVE STATE: Status: RESOLVED | Noted: 2024-07-08 | Resolved: 2024-08-01

## 2024-08-01 PROCEDURE — 99213 OFFICE O/P EST LOW 20 MIN: CPT

## 2024-08-01 PROCEDURE — 99214 OFFICE O/P EST MOD 30 MIN: CPT | Performed by: NURSE PRACTITIONER

## 2024-08-01 ASSESSMENT — PAIN DESCRIPTION - PAIN TYPE: TYPE: SURGICAL PAIN

## 2024-08-01 ASSESSMENT — PAIN DESCRIPTION - LOCATION: LOCATION: ABDOMEN

## 2024-08-01 ASSESSMENT — PAIN SCALES - GENERAL: PAINLEVEL_OUTOF10: 4

## 2024-08-01 NOTE — PATIENT INSTRUCTIONS
Visit Discharge/ physician orders:    Home Care: [facility]    Supplies   Size   Order #     Coloplast flat flange Cut to 44 mm [id number]   Coloplast drainable pouch  [id number]   Brava ring  [id number]   Brava Elastic Barrier Strips  [id number]     Brava Stoma Powder      [id number]     Change your flange 1-2   times / week and when leaking    Application of two Piece Colostomy/Ileostomy Pouch:  Assemble above supplies in order of application before removing pouch.  If not pre-cut: Cut a hole in the flange to fit the size of your stoma. Remove paper backing.  Remove your worn appliance by gently pulling away from skin and discard.  Wash skin with warm water, rinse and pat dry.  DO NOT USE SOAP!  Inspect your skin for redness or irritation.  If present, apply a small amount of powder to skin around stoma.  Brush off excess powder with a Kleenex. Do not use ointments.     - Stoma Powder - for wet, weepy skin      - Antifungal powder - for red,itchy rash  Apply ring directly around stoma or to inner edge of the flange.  Center the flange around your stoma.  Smooth the adhesive collar to your abdomen.  Apply your pouch, making sure the end of the pouch is closed.  Apply barrier extenders around outer edge of flange for added security.  Empty when 1/3 full.      Follow up: As needed    [facility]  [address] [address] [phone]

## 2024-08-01 NOTE — PLAN OF CARE
Problem: Skin/Tissue Integrity  Goal: Absence of new skin breakdown  Outcome: Progressing     Patient presents to wound and ostomy clinic for hospital follow up appointment for ostomy. Discharge instructions/orders per AVS. Follow up as needed.    Care plan reviewed with patient.  Patient verbalize understanding of the plan of care and contribute to goal setting.

## 2024-08-01 NOTE — PROGRESS NOTES
[facility]  [address] [address]  Telephone: [phone]     FAX [phone]    Home health agencies: [facility] Phone # [phone], Fax # [phone]      Patient Instructions   Visit Discharge/ physician orders:    Home Care: [facility]    Supplies   Size   Order #     Coloplast flat flange Cut to 44 mm [id number]   Coloplast drainable pouch  81838   Brava ring  [id number]   Brava Elastic Barrier Strips  [id number]     Brava Stoma Powder      [id number]     Change your flange 1-2   times / week and when leaking    Application of two Piece Colostomy/Ileostomy Pouch:  Assemble above supplies in order of application before removing pouch.  If not pre-cut: Cut a hole in the flange to fit the size of your stoma. Remove paper backing.  Remove your worn appliance by gently pulling away from skin and discard.  Wash skin with warm water, rinse and pat dry.  DO NOT USE SOAP!  Inspect your skin for redness or irritation.  If present, apply a small amount of powder to skin around stoma.  Brush off excess powder with a Kleenex. Do not use ointments.     - Stoma Powder - for wet, weepy skin      - Antifungal powder - for red,itchy rash  Apply ring directly around stoma or to inner edge of the flange.  Center the flange around your stoma.  Smooth the adhesive collar to your abdomen.  Apply your pouch, making sure the end of the pouch is closed.  Apply barrier extenders around outer edge of flange for added security.  Empty when 1/3 full.      Follow up: As needed    [facility]  [address] [address]  [phone]        Patient seen and treated on 8/1/2024    By PROVIDER'S NAME: Lucero Bradley CNP   NPI: [id number]           
Requires at least one staff member to physically assist the patient in ambulating into the treatment room and/or on off chair/bed.  Requires assistance to the bathroom.   []   2   Full Assistance Requires assistance of at least two staff members to transfer the patient into the treatment room and/or on/off bed/chair. \"Total Transfer.”  Unable to use bathroom requires bedside commode and/or bedpan []   3       Teaching Effort Documented in [facility] Clinical Note  Effort Definition Points   No Teaching  []   0   General Initial/Simple lesson:  Assess readiness to learn, and assess patient learning style to determine educational flow/special needs for learning.  Teaching related to 1-3 topics  Documentation in [facility] completed.   []   1   Intermediate Assess readiness to learn, and assess patient learning style to determine educational flow/special needs for learning.  Teaching related to 3-4 topics.   Hernia belt application and care considerations  Documentation in [facility] completed.   [x]   2   Complex Assess readiness to learn, and assess patient learning style to determine educational flow/special needs for learning.  The teaching of greater than 5 additional topics   Pre-operative ostomy education with a review of written resources for patient/family/caregiver as needed.  Demonstration/return demonstration of ostomy irrigation  Documentation in [facility] completed.   []   3     Patient Assessment and Planning in [facility] Clinical Note   Planning Definition Points   Simple Simple pouch change procedure completed and reviewed with patient/family/caregiver.   Documentation in [facility] completed.     []   1   Intermediate Moderate level of follow-up needs:   Pouch change/discharge procedure revised and reviewed with patient/caregiver.    Communications with outside resources, i.e., Telephone calls to Surgeon/ PCP, family/caregiver, home health, ECF.   Documentation in [facility] completed.     [x]   2   Complex Complex level 
weekly and adjusting cut to fit size as needed within 1-2 mm of stoma. Approximately 6 weeks after surgery, stoma size and shape should remain approximately the same except in the event of weight loss, weight gain or development of hernia.   Advised patient that some, small amount of bleeding may occur from stoma with vigorous cleaning or trauma with changing pouch.  This is normal.   Large amounts of bleeding or uncontrollable bleeding is not normal, call or seek emergency care should this occur.  Reviewed the importance of well balanced diet and adequate water intake.  Recommend the pouch be emptied when 1/3 full to prevent pouching failure from excess weight.  Recommend cleaning stoma and surrounding skin with plain tap water. Soap, body wash, baby wipes, or other bathroom wipes are not necessary and can interfere with pouch adherence. Once clean, the skin surrounding the stoma should be completely dry prior to application of pouching system.     Patient provided with demonstration of ostomy care today.  Step by step verbal and written instructions provided .  Discussed process of ordering supplies.   All questions and concerns addressed regarding pouching process during today's visit.  Patient verbalizes understanding of process.      Follow up: as needed.  Patient was advised to call clinic with any further needs or concerns.        Please see attached Discharge Instructions    Written patient dismissal instructions given to patient and signed by patient or POA.         All available lab work, radiology studies, and progress notes pertaining to Anirudh Segal reviewed prior to or during patient visit today.    I spent a total of 35 minutes with Anirudh Segal  on 8/1/2024.  Time spent includes review of previous progress notes, reviewing and discussing test results, education on plan of care for presenting diagnosis, answering questions, providing instructions on treatment and/or medications

## 2024-10-18 ENCOUNTER — OFFICE VISIT (OUTPATIENT)
Dept: FAMILY MEDICINE CLINIC | Age: 44
End: 2024-10-18

## 2024-10-18 VITALS
TEMPERATURE: 97.8 F | RESPIRATION RATE: 16 BRPM | DIASTOLIC BLOOD PRESSURE: 124 MMHG | BODY MASS INDEX: 25.55 KG/M2 | HEART RATE: 113 BPM | SYSTOLIC BLOOD PRESSURE: 188 MMHG | HEIGHT: 66 IN | WEIGHT: 159 LBS | OXYGEN SATURATION: 95 %

## 2024-10-18 DIAGNOSIS — J96.01 ACUTE RESPIRATORY FAILURE WITH HYPOXIA: ICD-10-CM

## 2024-10-18 DIAGNOSIS — I10 HYPERTENSION, ESSENTIAL: Primary | ICD-10-CM

## 2024-10-18 DIAGNOSIS — S14.109D CONTUSION OF CERVICAL CORD, SUBSEQUENT ENCOUNTER (HCC): ICD-10-CM

## 2024-10-18 RX ORDER — CLONIDINE 0.2 MG/24H
1 PATCH, EXTENDED RELEASE TRANSDERMAL WEEKLY
Qty: 4 PATCH | Refills: 0 | Status: SHIPPED | OUTPATIENT
Start: 2024-10-18

## 2024-10-18 NOTE — PROGRESS NOTES
Attending Physician Note    I have seen and evaluated the patient. I discussed the findings, assessment and plan with the resident and agree with the resident's findings and plan as documented in the resident's note.  GC modifier added.    Brief summary:  HTN - BP significantly elevated today, likely rebound htn since out of clonidine.  No CP, HA, vision changes or other sx of concern.  Refilled clonidine.  Continue amlodipine, hct.  Continue to monitor BP at home.  Return next week for BP check.
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
## 2024-10-18 NOTE — PROGRESS NOTES
Current Outpatient HTN Medications   Medication Instructions    amLODIPine (NORVASC) 10 mg, Per G Tube, DAILY    cloNIDine (CATAPRES) 0.2 MG/24HR PTWK 1 patch, TransDERmal, WEEKLY    doxazosin (CARDURA) 1 mg, Per G Tube, Nightly    hydroCHLOROthiazide (HYDRODIURIL) 25 mg, Per G Tube, DAILY     Vitals:    10/18/24 0924   BP: (!) 188/124      Latest Reference Range & Units 10/14/23 04:15 10/26/23 05:45   TSH, 3rd Generation 0.400 - 4.200 uIU/mL 4.360 (H) 4.310 (H)   T4 Free 0.93 - 1.76 ng/dL 0.57 (L) 1.54   (H): Data is abnormally high  (L): Data is abnormally low    After pharmacist chart review, the following recommendations are made:    If patient's BP is still elevated when he returns on 10/21, would recommend to increase clonidine patch to 0.3 mg once weekly or increase HCTZ to 50 mg once daily  Consider ordering another TSH/T4 level    For Pharmacy Admin Tracking Only    Program: Medical Group  CPA in place:  Yes  Recommendation Provided To: Provider: 2 via Note to Provider  Intervention Detail: Dose Adjustment: 1, reason: Therapy Optimization and Lab(s) Ordered  Time Spent (min): 10               1.36

## 2024-12-12 DIAGNOSIS — I10 HYPERTENSION, ESSENTIAL: ICD-10-CM

## 2024-12-12 NOTE — TELEPHONE ENCOUNTER
Patient's last appointment was: 10/18/2024  with our   Patient's next appointment is: Visit date not found    Last refilled on: 10/18/2024  Which pharmacy does the script need sent to: [facility] - [place], [place] - [address] - P [phone]       No results found for: \"LABA1C\"  Lab Results   Component Value Date    TRIG 161 10/17/2023     Lab Results   Component Value Date     07/13/2024    K 3.5 07/13/2024     07/13/2024    CO2 28 07/13/2024    BUN 5 (L) 07/13/2024    CREATININE 0.6 07/13/2024    GLUCOSE 89 07/13/2024    CALCIUM 8.4 (L) 07/13/2024    BILITOT 0.2 (L) 11/13/2023    ALKPHOS 297 (H) 11/13/2023    AST 31 11/13/2023    ALT 45 11/13/2023    LABGLOM > 90 07/13/2024     Lab Results   Component Value Date    TSH 4.310 (H) 10/26/2023    FT3 0.42 (L) 10/11/2023    T4FREE 1.54 10/26/2023     Lab Results   Component Value Date    WBC 6.9 07/12/2024    HGB 12.0 (L) 07/13/2024    HCT 35.7 (L) 07/13/2024    .3 (H) 07/12/2024     07/12/2024

## 2024-12-16 DIAGNOSIS — I10 HYPERTENSION, ESSENTIAL: ICD-10-CM

## 2024-12-16 RX ORDER — CLONIDINE 0.2 MG/24H
1 PATCH, EXTENDED RELEASE TRANSDERMAL WEEKLY
Qty: 4 PATCH | Refills: 2 | Status: SHIPPED | OUTPATIENT
Start: 2024-12-16

## 2024-12-16 RX ORDER — CLONIDINE 0.2 MG/24H
1 PATCH, EXTENDED RELEASE TRANSDERMAL WEEKLY
Qty: 4 PATCH | Refills: 0 | Status: SHIPPED | OUTPATIENT
Start: 2024-12-16 | End: 2024-12-16 | Stop reason: SDUPTHER

## 2024-12-16 NOTE — TELEPHONE ENCOUNTER
Med refill needs sent to [address]. Initial one was sent to pharmacy that patient no longer uses

## 2025-03-26 ENCOUNTER — LAB (OUTPATIENT)
Dept: LAB | Age: 45
End: 2025-03-26

## 2025-03-26 LAB
ANION GAP SERPL CALC-SCNC: 22 MEQ/L (ref 8–16)
BUN SERPL-MCNC: 8 MG/DL (ref 8–23)
CHLORIDE SERPL-SCNC: 107 MEQ/L (ref 98–111)
CO2 SERPL-SCNC: 14 MEQ/L (ref 22–29)
CREAT SERPL-MCNC: 0.6 MG/DL (ref 0.7–1.2)
GFR SERPL CREATININE-BSD FRML MDRD: > 90 ML/MIN/1.73M2
POTASSIUM SERPL-SCNC: 4.2 MEQ/L (ref 3.5–5.2)
SODIUM SERPL-SCNC: 143 MEQ/L (ref 135–145)

## 2025-07-28 ENCOUNTER — APPOINTMENT (OUTPATIENT)
Dept: INTERVENTIONAL RADIOLOGY/VASCULAR | Age: 45
End: 2025-07-28
Payer: COMMERCIAL

## 2025-07-28 ENCOUNTER — HOSPITAL ENCOUNTER (INPATIENT)
Age: 45
LOS: 4 days | Discharge: HOME | End: 2025-08-01
Attending: PHYSICIAN ASSISTANT | Admitting: PHYSICIAN ASSISTANT
Payer: COMMERCIAL

## 2025-07-28 ENCOUNTER — APPOINTMENT (OUTPATIENT)
Dept: GENERAL RADIOLOGY | Age: 45
End: 2025-07-28
Payer: COMMERCIAL

## 2025-07-28 ENCOUNTER — APPOINTMENT (OUTPATIENT)
Dept: ULTRASOUND IMAGING | Age: 45
End: 2025-07-28
Payer: COMMERCIAL

## 2025-07-28 DIAGNOSIS — M79.604 LEG PAIN, BILATERAL: ICD-10-CM

## 2025-07-28 DIAGNOSIS — M79.89 LEG SWELLING: Primary | ICD-10-CM

## 2025-07-28 DIAGNOSIS — E87.6 HYPOKALEMIA: ICD-10-CM

## 2025-07-28 DIAGNOSIS — R00.0 SINUS TACHYCARDIA: ICD-10-CM

## 2025-07-28 DIAGNOSIS — M79.605 LEG PAIN, BILATERAL: ICD-10-CM

## 2025-07-28 PROBLEM — F10.10 ALCOHOL ABUSE: Status: ACTIVE | Noted: 2025-07-28

## 2025-07-28 PROBLEM — E87.1 HYPONATREMIA: Status: ACTIVE | Noted: 2025-07-28

## 2025-07-28 LAB
ALBUMIN SERPL BCG-MCNC: 3.9 G/DL (ref 3.4–4.9)
ALP SERPL-CCNC: 150 U/L (ref 40–129)
ALT SERPL W/O P-5'-P-CCNC: 32 U/L (ref 10–50)
AMPHETAMINES UR QL SCN: NEGATIVE
ANION GAP SERPL CALC-SCNC: 18 MEQ/L (ref 8–16)
ANION GAP SERPL CALC-SCNC: 20 MEQ/L (ref 8–16)
ANION GAP SERPL CALC-SCNC: 24 MEQ/L (ref 8–16)
AST SERPL-CCNC: 166 U/L (ref 10–50)
BARBITURATES UR QL SCN: NEGATIVE
BASOPHILS ABSOLUTE: 0 THOU/MM3 (ref 0–0.1)
BASOPHILS NFR BLD AUTO: 0.3 %
BENZODIAZ UR QL SCN: NEGATIVE
BILIRUB CONJ SERPL-MCNC: 0.4 MG/DL (ref 0–0.2)
BILIRUB SERPL-MCNC: 0.7 MG/DL (ref 0.3–1.2)
BILIRUB UR QL STRIP.AUTO: NEGATIVE
BUN SERPL-MCNC: 15 MG/DL (ref 8–23)
BUN SERPL-MCNC: 15 MG/DL (ref 8–23)
BUN SERPL-MCNC: 16 MG/DL (ref 8–23)
BUPRENORPHINE URINE: NEGATIVE
BZE UR QL SCN: NEGATIVE
CALCIUM SERPL-MCNC: 8.9 MG/DL (ref 8.6–10)
CALCIUM SERPL-MCNC: 8.9 MG/DL (ref 8.6–10)
CALCIUM SERPL-MCNC: 9.2 MG/DL (ref 8.6–10)
CANNABINOIDS UR QL SCN: POSITIVE
CHARACTER UR: CLEAR
CHLORIDE SERPL-SCNC: 76 MEQ/L (ref 98–111)
CHLORIDE SERPL-SCNC: 80 MEQ/L (ref 98–111)
CHLORIDE SERPL-SCNC: 83 MEQ/L (ref 98–111)
CK SERPL-CCNC: 110 U/L (ref 39–308)
CO2 SERPL-SCNC: 29 MEQ/L (ref 22–29)
CO2 SERPL-SCNC: 31 MEQ/L (ref 22–29)
CO2 SERPL-SCNC: 33 MEQ/L (ref 22–29)
COLOR, UA: YELLOW
CREAT SERPL-MCNC: 0.6 MG/DL (ref 0.7–1.2)
DEPRECATED RDW RBC AUTO: 56.2 FL (ref 35–45)
ECHO BSA: 1.84 M2
EKG ATRIAL RATE: 110 BPM
EKG P AXIS: 70 DEGREES
EKG P-R INTERVAL: 162 MS
EKG Q-T INTERVAL: 358 MS
EKG QRS DURATION: 92 MS
EKG QTC CALCULATION (BAZETT): 484 MS
EKG R AXIS: 70 DEGREES
EKG T AXIS: 66 DEGREES
EKG VENTRICULAR RATE: 110 BPM
EOSINOPHIL NFR BLD AUTO: 0.4 %
EOSINOPHILS ABSOLUTE: 0 THOU/MM3 (ref 0–0.4)
ERYTHROCYTE [DISTWIDTH] IN BLOOD BY AUTOMATED COUNT: 14.7 % (ref 11.5–14.5)
ETHANOL SERPL-MCNC: < 0.01 % (ref 0–0.08)
FENTANYL: NEGATIVE
GFR SERPL CREATININE-BSD FRML MDRD: > 90 ML/MIN/1.73M2
GLUCOSE SERPL-MCNC: 116 MG/DL (ref 74–109)
GLUCOSE SERPL-MCNC: 93 MG/DL (ref 74–109)
GLUCOSE SERPL-MCNC: 96 MG/DL (ref 74–109)
GLUCOSE UR QL STRIP.AUTO: NEGATIVE MG/DL
HCT VFR BLD AUTO: 29.4 % (ref 42–52)
HGB BLD-MCNC: 10.3 GM/DL (ref 14–18)
HGB UR QL STRIP.AUTO: NEGATIVE
IMM GRANULOCYTES # BLD AUTO: 0.07 THOU/MM3 (ref 0–0.07)
IMM GRANULOCYTES NFR BLD AUTO: 1 %
KETONES UR QL STRIP.AUTO: 40
LACTIC ACID, SEPSIS: 1.5 MMOL/L (ref 0.5–1.9)
LYMPHOCYTES ABSOLUTE: 1.3 THOU/MM3 (ref 1–4.8)
LYMPHOCYTES NFR BLD AUTO: 19 %
MAGNESIUM SERPL-MCNC: 1.7 MG/DL (ref 1.6–2.6)
MCH RBC QN AUTO: 36.1 PG (ref 26–33)
MCHC RBC AUTO-ENTMCNC: 35 GM/DL (ref 32.2–35.5)
MCV RBC AUTO: 103.2 FL (ref 80–94)
MONOCYTES ABSOLUTE: 0.6 THOU/MM3 (ref 0.4–1.3)
MONOCYTES NFR BLD AUTO: 8.2 %
NEUTROPHILS ABSOLUTE: 4.8 THOU/MM3 (ref 1.8–7.7)
NEUTROPHILS NFR BLD AUTO: 71.1 %
NITRITE UR QL STRIP: NEGATIVE
NRBC BLD AUTO-RTO: 0 /100 WBC
NT-PROBNP SERPL IA-MCNC: < 36 PG/ML (ref 0–124)
OPIATES UR QL SCN: POSITIVE
OSMOLALITY SERPL CALC.SUM OF ELEC: 259.8 MOSMOL/KG (ref 275–300)
OSMOLALITY SERPL CALC.SUM OF ELEC: 264.5 MOSMOL/KG (ref 275–300)
OXYCODONE: NEGATIVE
PCP UR QL SCN: NEGATIVE
PH UR STRIP.AUTO: 6.5 [PH] (ref 5–9)
PLATELET # BLD AUTO: 125 THOU/MM3 (ref 130–400)
PMV BLD AUTO: 10.5 FL (ref 9.4–12.4)
POTASSIUM SERPL-SCNC: 2.6 MEQ/L (ref 3.5–5.2)
POTASSIUM SERPL-SCNC: 2.7 MEQ/L (ref 3.5–5.2)
POTASSIUM SERPL-SCNC: 3.9 MEQ/L (ref 3.5–5.2)
PROT SERPL-MCNC: 7 G/DL (ref 6.4–8.3)
PROT UR STRIP.AUTO-MCNC: NEGATIVE MG/DL
RBC # BLD AUTO: 2.85 MILL/MM3 (ref 4.7–6.1)
REASON FOR REJECTION: NORMAL
REJECTED TEST: NORMAL
SODIUM SERPL-SCNC: 129 MEQ/L (ref 135–145)
SODIUM SERPL-SCNC: 131 MEQ/L (ref 135–145)
SODIUM SERPL-SCNC: 134 MEQ/L (ref 135–145)
SP GR UR REFRACT.AUTO: 1.01 (ref 1–1.03)
TROPONIN, HIGH SENSITIVITY: 13 NG/L (ref 0–12)
TROPONIN, HIGH SENSITIVITY: 14 NG/L (ref 0–12)
UROBILINOGEN, URINE: 1 EU/DL (ref 0–1)
WBC # BLD AUTO: 6.8 THOU/MM3 (ref 4.8–10.8)
WBC #/AREA URNS HPF: NEGATIVE /[HPF]

## 2025-07-28 PROCEDURE — 83735 ASSAY OF MAGNESIUM: CPT

## 2025-07-28 PROCEDURE — 96365 THER/PROPH/DIAG IV INF INIT: CPT

## 2025-07-28 PROCEDURE — 84484 ASSAY OF TROPONIN QUANT: CPT

## 2025-07-28 PROCEDURE — 81003 URINALYSIS AUTO W/O SCOPE: CPT

## 2025-07-28 PROCEDURE — 93970 EXTREMITY STUDY: CPT

## 2025-07-28 PROCEDURE — 80307 DRUG TEST PRSMV CHEM ANLYZR: CPT

## 2025-07-28 PROCEDURE — 1200000003 HC TELEMETRY R&B

## 2025-07-28 PROCEDURE — 85025 COMPLETE CBC W/AUTO DIFF WBC: CPT

## 2025-07-28 PROCEDURE — 6370000000 HC RX 637 (ALT 250 FOR IP)

## 2025-07-28 PROCEDURE — 99223 1ST HOSP IP/OBS HIGH 75: CPT | Performed by: PHYSICIAN ASSISTANT

## 2025-07-28 PROCEDURE — 6370000000 HC RX 637 (ALT 250 FOR IP): Performed by: PHYSICIAN ASSISTANT

## 2025-07-28 PROCEDURE — 6360000002 HC RX W HCPCS: Performed by: PHYSICIAN ASSISTANT

## 2025-07-28 PROCEDURE — 2580000003 HC RX 258

## 2025-07-28 PROCEDURE — 71046 X-RAY EXAM CHEST 2 VIEWS: CPT

## 2025-07-28 PROCEDURE — 96375 TX/PRO/DX INJ NEW DRUG ADDON: CPT

## 2025-07-28 PROCEDURE — 36415 COLL VENOUS BLD VENIPUNCTURE: CPT

## 2025-07-28 PROCEDURE — 83880 ASSAY OF NATRIURETIC PEPTIDE: CPT

## 2025-07-28 PROCEDURE — 80076 HEPATIC FUNCTION PANEL: CPT

## 2025-07-28 PROCEDURE — 93005 ELECTROCARDIOGRAM TRACING: CPT | Performed by: PHYSICIAN ASSISTANT

## 2025-07-28 PROCEDURE — 82077 ASSAY SPEC XCP UR&BREATH IA: CPT

## 2025-07-28 PROCEDURE — 76705 ECHO EXAM OF ABDOMEN: CPT

## 2025-07-28 PROCEDURE — 6360000002 HC RX W HCPCS

## 2025-07-28 PROCEDURE — 93010 ELECTROCARDIOGRAM REPORT: CPT | Performed by: INTERNAL MEDICINE

## 2025-07-28 PROCEDURE — 80048 BASIC METABOLIC PNL TOTAL CA: CPT

## 2025-07-28 PROCEDURE — 82550 ASSAY OF CK (CPK): CPT

## 2025-07-28 PROCEDURE — 96366 THER/PROPH/DIAG IV INF ADDON: CPT

## 2025-07-28 PROCEDURE — 83605 ASSAY OF LACTIC ACID: CPT

## 2025-07-28 PROCEDURE — 1200000000 HC SEMI PRIVATE

## 2025-07-28 PROCEDURE — 2580000003 HC RX 258: Performed by: PHYSICIAN ASSISTANT

## 2025-07-28 PROCEDURE — 99285 EMERGENCY DEPT VISIT HI MDM: CPT

## 2025-07-28 RX ORDER — SODIUM CHLORIDE 9 MG/ML
INJECTION, SOLUTION INTRAVENOUS PRN
Status: DISCONTINUED | OUTPATIENT
Start: 2025-07-28 | End: 2025-08-01 | Stop reason: HOSPADM

## 2025-07-28 RX ORDER — HYDROCHLOROTHIAZIDE 25 MG/1
25 TABLET ORAL DAILY
Status: DISCONTINUED | OUTPATIENT
Start: 2025-07-29 | End: 2025-08-01 | Stop reason: HOSPADM

## 2025-07-28 RX ORDER — POTASSIUM CHLORIDE 7.45 MG/ML
10 INJECTION INTRAVENOUS PRN
Status: DISCONTINUED | OUTPATIENT
Start: 2025-07-28 | End: 2025-08-01 | Stop reason: HOSPADM

## 2025-07-28 RX ORDER — ONDANSETRON 2 MG/ML
4 INJECTION INTRAMUSCULAR; INTRAVENOUS EVERY 6 HOURS PRN
Status: DISCONTINUED | OUTPATIENT
Start: 2025-07-28 | End: 2025-08-01 | Stop reason: HOSPADM

## 2025-07-28 RX ORDER — POTASSIUM CHLORIDE 1500 MG/1
40 TABLET, EXTENDED RELEASE ORAL PRN
Status: DISCONTINUED | OUTPATIENT
Start: 2025-07-28 | End: 2025-08-01 | Stop reason: HOSPADM

## 2025-07-28 RX ORDER — ENOXAPARIN SODIUM 100 MG/ML
40 INJECTION SUBCUTANEOUS DAILY
Status: DISCONTINUED | OUTPATIENT
Start: 2025-07-28 | End: 2025-08-01 | Stop reason: HOSPADM

## 2025-07-28 RX ORDER — POLYETHYLENE GLYCOL 3350 17 G/17G
17 POWDER, FOR SOLUTION ORAL DAILY PRN
Status: DISCONTINUED | OUTPATIENT
Start: 2025-07-28 | End: 2025-08-01 | Stop reason: HOSPADM

## 2025-07-28 RX ORDER — SODIUM CHLORIDE 0.9 % (FLUSH) 0.9 %
10 SYRINGE (ML) INJECTION EVERY 12 HOURS SCHEDULED
Status: DISCONTINUED | OUTPATIENT
Start: 2025-07-28 | End: 2025-08-01 | Stop reason: HOSPADM

## 2025-07-28 RX ORDER — LORAZEPAM 2 MG/ML
4 INJECTION INTRAMUSCULAR
Status: DISCONTINUED | OUTPATIENT
Start: 2025-07-28 | End: 2025-08-01 | Stop reason: HOSPADM

## 2025-07-28 RX ORDER — HYDROCODONE BITARTRATE AND ACETAMINOPHEN 5; 325 MG/1; MG/1
1 TABLET ORAL ONCE
Status: COMPLETED | OUTPATIENT
Start: 2025-07-28 | End: 2025-07-28

## 2025-07-28 RX ORDER — POTASSIUM CHLORIDE 1500 MG/1
40 TABLET, EXTENDED RELEASE ORAL ONCE
Status: COMPLETED | OUTPATIENT
Start: 2025-07-28 | End: 2025-07-28

## 2025-07-28 RX ORDER — LORAZEPAM 2 MG/ML
3 INJECTION INTRAMUSCULAR
Status: DISCONTINUED | OUTPATIENT
Start: 2025-07-28 | End: 2025-08-01 | Stop reason: HOSPADM

## 2025-07-28 RX ORDER — LEVETIRACETAM 100 MG/ML
500 SOLUTION ORAL 2 TIMES DAILY
Status: DISCONTINUED | OUTPATIENT
Start: 2025-07-28 | End: 2025-08-01 | Stop reason: HOSPADM

## 2025-07-28 RX ORDER — SODIUM CHLORIDE 0.9 % (FLUSH) 0.9 %
10 SYRINGE (ML) INJECTION PRN
Status: DISCONTINUED | OUTPATIENT
Start: 2025-07-28 | End: 2025-08-01 | Stop reason: HOSPADM

## 2025-07-28 RX ORDER — LORAZEPAM 2 MG/ML
2 INJECTION INTRAMUSCULAR
Status: DISCONTINUED | OUTPATIENT
Start: 2025-07-28 | End: 2025-08-01 | Stop reason: HOSPADM

## 2025-07-28 RX ORDER — BACLOFEN 10 MG/1
10 TABLET ORAL 4 TIMES DAILY PRN
Status: DISCONTINUED | OUTPATIENT
Start: 2025-07-28 | End: 2025-08-01 | Stop reason: HOSPADM

## 2025-07-28 RX ORDER — KETOROLAC TROMETHAMINE 30 MG/ML
15 INJECTION, SOLUTION INTRAMUSCULAR; INTRAVENOUS ONCE
Status: COMPLETED | OUTPATIENT
Start: 2025-07-28 | End: 2025-07-28

## 2025-07-28 RX ORDER — ACETAMINOPHEN 650 MG/1
650 SUPPOSITORY RECTAL EVERY 6 HOURS PRN
Status: DISCONTINUED | OUTPATIENT
Start: 2025-07-28 | End: 2025-08-01 | Stop reason: HOSPADM

## 2025-07-28 RX ORDER — LEVOTHYROXINE SODIUM 50 UG/1
50 TABLET ORAL DAILY
Status: DISCONTINUED | OUTPATIENT
Start: 2025-07-29 | End: 2025-08-01 | Stop reason: HOSPADM

## 2025-07-28 RX ORDER — SODIUM CHLORIDE, SODIUM LACTATE, POTASSIUM CHLORIDE, CALCIUM CHLORIDE 600; 310; 30; 20 MG/100ML; MG/100ML; MG/100ML; MG/100ML
INJECTION, SOLUTION INTRAVENOUS CONTINUOUS
Status: ACTIVE | OUTPATIENT
Start: 2025-07-28 | End: 2025-07-29

## 2025-07-28 RX ORDER — GABAPENTIN 300 MG/1
300 CAPSULE ORAL 3 TIMES DAILY
Status: DISCONTINUED | OUTPATIENT
Start: 2025-07-28 | End: 2025-07-30

## 2025-07-28 RX ORDER — 0.9 % SODIUM CHLORIDE 0.9 %
500 INTRAVENOUS SOLUTION INTRAVENOUS ONCE
Status: COMPLETED | OUTPATIENT
Start: 2025-07-28 | End: 2025-07-28

## 2025-07-28 RX ORDER — HYDROXYZINE HYDROCHLORIDE 25 MG/1
25 TABLET, FILM COATED ORAL NIGHTLY
Status: DISCONTINUED | OUTPATIENT
Start: 2025-07-28 | End: 2025-08-01 | Stop reason: HOSPADM

## 2025-07-28 RX ORDER — ONDANSETRON 4 MG/1
4 TABLET, ORALLY DISINTEGRATING ORAL EVERY 8 HOURS PRN
Status: DISCONTINUED | OUTPATIENT
Start: 2025-07-28 | End: 2025-08-01 | Stop reason: HOSPADM

## 2025-07-28 RX ORDER — LANOLIN ALCOHOL/MO/W.PET/CERES
100 CREAM (GRAM) TOPICAL DAILY
Status: DISCONTINUED | OUTPATIENT
Start: 2025-07-29 | End: 2025-08-01 | Stop reason: HOSPADM

## 2025-07-28 RX ORDER — MAGNESIUM SULFATE IN WATER 40 MG/ML
2000 INJECTION, SOLUTION INTRAVENOUS PRN
Status: DISCONTINUED | OUTPATIENT
Start: 2025-07-28 | End: 2025-08-01 | Stop reason: HOSPADM

## 2025-07-28 RX ORDER — FOLIC ACID 1 MG/1
1 TABLET ORAL DAILY
Status: DISCONTINUED | OUTPATIENT
Start: 2025-07-29 | End: 2025-08-01 | Stop reason: HOSPADM

## 2025-07-28 RX ORDER — SODIUM CHLORIDE 0.9 % (FLUSH) 0.9 %
5-40 SYRINGE (ML) INJECTION PRN
Status: DISCONTINUED | OUTPATIENT
Start: 2025-07-28 | End: 2025-08-01 | Stop reason: HOSPADM

## 2025-07-28 RX ORDER — LORAZEPAM 2 MG/1
2 TABLET ORAL
Status: DISCONTINUED | OUTPATIENT
Start: 2025-07-28 | End: 2025-08-01 | Stop reason: HOSPADM

## 2025-07-28 RX ORDER — SODIUM CHLORIDE 0.9 % (FLUSH) 0.9 %
5-40 SYRINGE (ML) INJECTION EVERY 12 HOURS SCHEDULED
Status: DISCONTINUED | OUTPATIENT
Start: 2025-07-28 | End: 2025-08-01 | Stop reason: HOSPADM

## 2025-07-28 RX ORDER — LORAZEPAM 1 MG/1
1 TABLET ORAL
Status: DISCONTINUED | OUTPATIENT
Start: 2025-07-28 | End: 2025-08-01 | Stop reason: HOSPADM

## 2025-07-28 RX ORDER — NICOTINE 21 MG/24HR
1 PATCH, TRANSDERMAL 24 HOURS TRANSDERMAL DAILY
Status: DISCONTINUED | OUTPATIENT
Start: 2025-07-28 | End: 2025-08-01 | Stop reason: HOSPADM

## 2025-07-28 RX ORDER — ACETAMINOPHEN 325 MG/1
650 TABLET ORAL EVERY 6 HOURS PRN
Status: DISCONTINUED | OUTPATIENT
Start: 2025-07-28 | End: 2025-08-01 | Stop reason: HOSPADM

## 2025-07-28 RX ORDER — AMLODIPINE BESYLATE 10 MG/1
10 TABLET ORAL DAILY
Status: DISCONTINUED | OUTPATIENT
Start: 2025-07-29 | End: 2025-08-01 | Stop reason: HOSPADM

## 2025-07-28 RX ORDER — CLONIDINE 0.2 MG/24H
1 PATCH, EXTENDED RELEASE TRANSDERMAL WEEKLY
Status: DISCONTINUED | OUTPATIENT
Start: 2025-07-29 | End: 2025-08-01 | Stop reason: HOSPADM

## 2025-07-28 RX ORDER — LORAZEPAM 2 MG/1
4 TABLET ORAL
Status: DISCONTINUED | OUTPATIENT
Start: 2025-07-28 | End: 2025-08-01 | Stop reason: HOSPADM

## 2025-07-28 RX ORDER — LORAZEPAM 2 MG/ML
1 INJECTION INTRAMUSCULAR
Status: DISCONTINUED | OUTPATIENT
Start: 2025-07-28 | End: 2025-08-01 | Stop reason: HOSPADM

## 2025-07-28 RX ORDER — MAGNESIUM SULFATE IN WATER 40 MG/ML
2000 INJECTION, SOLUTION INTRAVENOUS ONCE
Status: COMPLETED | OUTPATIENT
Start: 2025-07-28 | End: 2025-07-28

## 2025-07-28 RX ADMIN — SODIUM CHLORIDE, SODIUM LACTATE, POTASSIUM CHLORIDE, AND CALCIUM CHLORIDE: .6; .31; .03; .02 INJECTION, SOLUTION INTRAVENOUS at 20:16

## 2025-07-28 RX ADMIN — MAGNESIUM SULFATE HEPTAHYDRATE 2000 MG: 40 INJECTION, SOLUTION INTRAVENOUS at 13:34

## 2025-07-28 RX ADMIN — POTASSIUM CHLORIDE 10 MEQ: 7.46 INJECTION, SOLUTION INTRAVENOUS at 22:22

## 2025-07-28 RX ADMIN — GABAPENTIN 300 MG: 300 CAPSULE ORAL at 22:23

## 2025-07-28 RX ADMIN — ENOXAPARIN SODIUM 40 MG: 100 INJECTION SUBCUTANEOUS at 21:41

## 2025-07-28 RX ADMIN — POTASSIUM CHLORIDE 40 MEQ: 1500 TABLET, EXTENDED RELEASE ORAL at 12:46

## 2025-07-28 RX ADMIN — LEVETIRACETAM 500 MG: 500 SOLUTION ORAL at 22:23

## 2025-07-28 RX ADMIN — HYDROCODONE BITARTRATE AND ACETAMINOPHEN 1 TABLET: 5; 325 TABLET ORAL at 12:33

## 2025-07-28 RX ADMIN — KETOROLAC TROMETHAMINE 15 MG: 30 INJECTION, SOLUTION INTRAMUSCULAR at 15:25

## 2025-07-28 RX ADMIN — SODIUM CHLORIDE 500 ML: 0.9 INJECTION, SOLUTION INTRAVENOUS at 12:48

## 2025-07-28 RX ADMIN — HYDROXYZINE HYDROCHLORIDE 25 MG: 25 TABLET ORAL at 22:23

## 2025-07-28 ASSESSMENT — PAIN SCALES - GENERAL
PAINLEVEL_OUTOF10: 10
PAINLEVEL_OUTOF10: 7
PAINLEVEL_OUTOF10: 10

## 2025-07-28 ASSESSMENT — PAIN DESCRIPTION - ORIENTATION
ORIENTATION: LEFT;RIGHT
ORIENTATION: LEFT;RIGHT

## 2025-07-28 ASSESSMENT — PAIN DESCRIPTION - LOCATION
LOCATION: LEG

## 2025-07-28 ASSESSMENT — PAIN - FUNCTIONAL ASSESSMENT: PAIN_FUNCTIONAL_ASSESSMENT: 0-10

## 2025-07-28 NOTE — ED TRIAGE NOTES
Pt presents to ED with chief complaint of bilateral leg swelling. Pt states they have been swollen and painful x 1 week and he is having difficulty walking d/t the pain.

## 2025-07-28 NOTE — PLAN OF CARE
Problem: Discharge Planning  Goal: Discharge to home or other facility with appropriate resources  Outcome: Progressing  Flowsheets (Taken 7/28/2025 1815)  Discharge to home or other facility with appropriate resources: Arrange for needed discharge resources and transportation as appropriate     Problem: Seizure Precautions  Goal: Remains free of injury related to seizures activity  Outcome: Progressing  Flowsheets (Taken 7/28/2025 1815)  Remains free of injury related to seizure activity: Monitor patient for seizure activity, document and report duration and description of seizure to Licensed Independent Practitioner     Problem: Pain  Goal: Verbalizes/displays adequate comfort level or baseline comfort level  Outcome: Progressing  Flowsheets (Taken 7/28/2025 1815)  Verbalizes/displays adequate comfort level or baseline comfort level: Encourage patient to monitor pain and request assistance
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
## 2025-07-29 LAB
ALBUMIN SERPL BCG-MCNC: 3.5 G/DL (ref 3.4–4.9)
ALP SERPL-CCNC: 133 U/L (ref 40–129)
ALT SERPL W/O P-5'-P-CCNC: 26 U/L (ref 10–50)
ANION GAP SERPL CALC-SCNC: 14 MEQ/L (ref 8–16)
ANION GAP SERPL CALC-SCNC: 17 MEQ/L (ref 8–16)
AST SERPL-CCNC: 138 U/L (ref 10–50)
BASOPHILS ABSOLUTE: 0 THOU/MM3 (ref 0–0.1)
BASOPHILS NFR BLD AUTO: 0.6 %
BILIRUB SERPL-MCNC: 0.4 MG/DL (ref 0.3–1.2)
BUN SERPL-MCNC: 14 MG/DL (ref 8–23)
BUN SERPL-MCNC: 16 MG/DL (ref 8–23)
CALCIUM SERPL-MCNC: 9 MG/DL (ref 8.6–10)
CALCIUM SERPL-MCNC: 9.4 MG/DL (ref 8.6–10)
CHLORIDE SERPL-SCNC: 85 MEQ/L (ref 98–111)
CHLORIDE SERPL-SCNC: 88 MEQ/L (ref 98–111)
CO2 SERPL-SCNC: 31 MEQ/L (ref 22–29)
CO2 SERPL-SCNC: 35 MEQ/L (ref 22–29)
CREAT SERPL-MCNC: 0.5 MG/DL (ref 0.7–1.2)
CREAT SERPL-MCNC: 0.6 MG/DL (ref 0.7–1.2)
CRP SERPL-MCNC: 2.77 MG/DL (ref 0–0.5)
DEPRECATED RDW RBC AUTO: 57.1 FL (ref 35–45)
EOSINOPHIL NFR BLD AUTO: 0.4 %
EOSINOPHILS ABSOLUTE: 0 THOU/MM3 (ref 0–0.4)
ERYTHROCYTE [DISTWIDTH] IN BLOOD BY AUTOMATED COUNT: 14.6 % (ref 11.5–14.5)
ERYTHROCYTE [SEDIMENTATION RATE] IN BLOOD BY WESTERGREN METHOD: 68 MM/HR (ref 0–20)
FERRITIN SERPL IA-MCNC: 455 NG/ML (ref 30–400)
FOLATE SERPL-MCNC: 3.5 NG/ML (ref 4.6–34.8)
GFR SERPL CREATININE-BSD FRML MDRD: > 90 ML/MIN/1.73M2
GFR SERPL CREATININE-BSD FRML MDRD: > 90 ML/MIN/1.73M2
GLUCOSE SERPL-MCNC: 118 MG/DL (ref 74–109)
GLUCOSE SERPL-MCNC: 98 MG/DL (ref 74–109)
HCT VFR BLD AUTO: 28.8 % (ref 42–52)
HGB BLD-MCNC: 9.8 GM/DL (ref 14–18)
HGB RETIC QN AUTO: 36.5 PG (ref 28.2–35.7)
IMM GRANULOCYTES # BLD AUTO: 0.05 THOU/MM3 (ref 0–0.07)
IMM GRANULOCYTES NFR BLD AUTO: 1 %
IMM RETICS NFR: 14.7 % (ref 2.3–13.4)
INR PPP: 0.89 (ref 0.85–1.13)
IRON SATN MFR SERPL: 49 % (ref 20–50)
IRON SERPL-MCNC: 73 UG/DL (ref 61–157)
LYMPHOCYTES ABSOLUTE: 1.6 THOU/MM3 (ref 1–4.8)
LYMPHOCYTES NFR BLD AUTO: 30.7 %
MAGNESIUM SERPL-MCNC: 2.4 MG/DL (ref 1.6–2.6)
MCH RBC QN AUTO: 35.9 PG (ref 26–33)
MCHC RBC AUTO-ENTMCNC: 34 GM/DL (ref 32.2–35.5)
MCV RBC AUTO: 105.5 FL (ref 80–94)
MONOCYTES ABSOLUTE: 0.4 THOU/MM3 (ref 0.4–1.3)
MONOCYTES NFR BLD AUTO: 7.6 %
NEUTROPHILS ABSOLUTE: 3 THOU/MM3 (ref 1.8–7.7)
NEUTROPHILS NFR BLD AUTO: 59.7 %
NRBC BLD AUTO-RTO: 0 /100 WBC
PLATELET # BLD AUTO: 142 THOU/MM3 (ref 130–400)
PMV BLD AUTO: 10.4 FL (ref 9.4–12.4)
POTASSIUM SERPL-SCNC: 2.8 MEQ/L (ref 3.5–5.2)
POTASSIUM SERPL-SCNC: 3 MEQ/L (ref 3.5–5.2)
POTASSIUM SERPL-SCNC: 3 MEQ/L (ref 3.5–5.2)
PROT SERPL-MCNC: 6 G/DL (ref 6.4–8.3)
PROTHROMBIN TIME: 10.4 SECONDS (ref 10–13.5)
RBC # BLD AUTO: 2.73 MILL/MM3 (ref 4.7–6.1)
RETICS # AUTO: 11 THOU/MM3 (ref 20–115)
RETICS/RBC NFR AUTO: 0.4 % (ref 0.5–2)
SODIUM SERPL-SCNC: 133 MEQ/L (ref 135–145)
SODIUM SERPL-SCNC: 137 MEQ/L (ref 135–145)
TIBC SERPL-MCNC: 148 UG/DL (ref 171–450)
VIT B12 SERPL-MCNC: 599 PG/ML (ref 232–1245)
WBC # BLD AUTO: 5.1 THOU/MM3 (ref 4.8–10.8)

## 2025-07-29 PROCEDURE — 36415 COLL VENOUS BLD VENIPUNCTURE: CPT

## 2025-07-29 PROCEDURE — 83550 IRON BINDING TEST: CPT

## 2025-07-29 PROCEDURE — 82728 ASSAY OF FERRITIN: CPT

## 2025-07-29 PROCEDURE — 1200000000 HC SEMI PRIVATE

## 2025-07-29 PROCEDURE — 6360000002 HC RX W HCPCS: Performed by: PHYSICIAN ASSISTANT

## 2025-07-29 PROCEDURE — 82746 ASSAY OF FOLIC ACID SERUM: CPT

## 2025-07-29 PROCEDURE — 83735 ASSAY OF MAGNESIUM: CPT

## 2025-07-29 PROCEDURE — 85025 COMPLETE CBC W/AUTO DIFF WBC: CPT

## 2025-07-29 PROCEDURE — 6370000000 HC RX 637 (ALT 250 FOR IP): Performed by: PHYSICIAN ASSISTANT

## 2025-07-29 PROCEDURE — 82607 VITAMIN B-12: CPT

## 2025-07-29 PROCEDURE — 83540 ASSAY OF IRON: CPT

## 2025-07-29 PROCEDURE — 6370000000 HC RX 637 (ALT 250 FOR IP)

## 2025-07-29 PROCEDURE — 85046 RETICYTE/HGB CONCENTRATE: CPT

## 2025-07-29 PROCEDURE — 80053 COMPREHEN METABOLIC PANEL: CPT

## 2025-07-29 PROCEDURE — 86140 C-REACTIVE PROTEIN: CPT

## 2025-07-29 PROCEDURE — 85651 RBC SED RATE NONAUTOMATED: CPT

## 2025-07-29 PROCEDURE — 99233 SBSQ HOSP IP/OBS HIGH 50: CPT | Performed by: PHYSICIAN ASSISTANT

## 2025-07-29 PROCEDURE — 85610 PROTHROMBIN TIME: CPT

## 2025-07-29 RX ORDER — POTASSIUM CHLORIDE 1500 MG/1
40 TABLET, EXTENDED RELEASE ORAL ONCE
Status: COMPLETED | OUTPATIENT
Start: 2025-07-29 | End: 2025-07-29

## 2025-07-29 RX ORDER — KETOROLAC TROMETHAMINE 30 MG/ML
15 INJECTION, SOLUTION INTRAMUSCULAR; INTRAVENOUS EVERY 6 HOURS PRN
Status: DISCONTINUED | OUTPATIENT
Start: 2025-07-29 | End: 2025-08-01 | Stop reason: HOSPADM

## 2025-07-29 RX ADMIN — KETOROLAC TROMETHAMINE 15 MG: 30 INJECTION, SOLUTION INTRAMUSCULAR at 14:29

## 2025-07-29 RX ADMIN — LEVETIRACETAM 500 MG: 500 SOLUTION ORAL at 21:22

## 2025-07-29 RX ADMIN — POTASSIUM CHLORIDE 10 MEQ: 7.46 INJECTION, SOLUTION INTRAVENOUS at 15:29

## 2025-07-29 RX ADMIN — GABAPENTIN 300 MG: 300 CAPSULE ORAL at 13:36

## 2025-07-29 RX ADMIN — HYDROXYZINE HYDROCHLORIDE 25 MG: 25 TABLET ORAL at 21:22

## 2025-07-29 RX ADMIN — POTASSIUM CHLORIDE 10 MEQ: 7.46 INJECTION, SOLUTION INTRAVENOUS at 08:00

## 2025-07-29 RX ADMIN — GABAPENTIN 300 MG: 300 CAPSULE ORAL at 08:26

## 2025-07-29 RX ADMIN — HYDROCHLOROTHIAZIDE 25 MG: 25 TABLET ORAL at 08:26

## 2025-07-29 RX ADMIN — POTASSIUM CHLORIDE 40 MEQ: 1500 TABLET, EXTENDED RELEASE ORAL at 01:01

## 2025-07-29 RX ADMIN — POTASSIUM CHLORIDE 10 MEQ: 7.46 INJECTION, SOLUTION INTRAVENOUS at 13:35

## 2025-07-29 RX ADMIN — AMLODIPINE BESYLATE 10 MG: 10 TABLET ORAL at 08:26

## 2025-07-29 RX ADMIN — POTASSIUM CHLORIDE 10 MEQ: 7.46 INJECTION, SOLUTION INTRAVENOUS at 16:27

## 2025-07-29 RX ADMIN — Medication 100 MG: at 08:26

## 2025-07-29 RX ADMIN — POTASSIUM CHLORIDE 10 MEQ: 7.46 INJECTION, SOLUTION INTRAVENOUS at 14:27

## 2025-07-29 RX ADMIN — POTASSIUM CHLORIDE 10 MEQ: 7.46 INJECTION, SOLUTION INTRAVENOUS at 17:59

## 2025-07-29 RX ADMIN — ENOXAPARIN SODIUM 40 MG: 100 INJECTION SUBCUTANEOUS at 08:26

## 2025-07-29 RX ADMIN — LEVOTHYROXINE SODIUM 50 MCG: 0.05 TABLET ORAL at 07:24

## 2025-07-29 RX ADMIN — GABAPENTIN 300 MG: 300 CAPSULE ORAL at 21:22

## 2025-07-29 RX ADMIN — FOLIC ACID 1 MG: 1 TABLET ORAL at 08:26

## 2025-07-29 RX ADMIN — KETOROLAC TROMETHAMINE 15 MG: 30 INJECTION, SOLUTION INTRAMUSCULAR at 21:22

## 2025-07-29 RX ADMIN — LEVETIRACETAM 500 MG: 500 SOLUTION ORAL at 08:27

## 2025-07-29 ASSESSMENT — PAIN DESCRIPTION - LOCATION: LOCATION: LEG

## 2025-07-29 ASSESSMENT — PAIN SCALES - GENERAL
PAINLEVEL_OUTOF10: 4
PAINLEVEL_OUTOF10: 8
PAINLEVEL_OUTOF10: 4

## 2025-07-29 ASSESSMENT — PAIN DESCRIPTION - ORIENTATION: ORIENTATION: RIGHT;LEFT

## 2025-07-29 ASSESSMENT — PATIENT HEALTH QUESTIONNAIRE - PHQ9
SUM OF ALL RESPONSES TO PHQ QUESTIONS 1-9: 0
SUM OF ALL RESPONSES TO PHQ QUESTIONS 1-9: 0
2. FEELING DOWN, DEPRESSED OR HOPELESS: NOT AT ALL
SUM OF ALL RESPONSES TO PHQ QUESTIONS 1-9: 0
1. LITTLE INTEREST OR PLEASURE IN DOING THINGS: NOT AT ALL
SUM OF ALL RESPONSES TO PHQ QUESTIONS 1-9: 0

## 2025-07-29 ASSESSMENT — LIFESTYLE VARIABLES
HOW OFTEN DO YOU HAVE A DRINK CONTAINING ALCOHOL: 4 OR MORE TIMES A WEEK
HOW MANY STANDARD DRINKS CONTAINING ALCOHOL DO YOU HAVE ON A TYPICAL DAY: 7 TO 9
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
## 2025-07-29 NOTE — CONSULTS
Brief Intervention and Referral to Treatment Summary    Patient was provided PHQ-9, AUDIT-C and DAST Screening:      PHQ-9 Score: 0  AUDIT-C Score:  11  DAST Score:  2    Patient’s substance use is considered     Dependent      Patient’s depression is considered:     Minimal    Brief Education Was Provided    Patient was receptive      Brief Intervention(s) Provided  at time of screening(Only for AUDIT-C or DAST)     Document Brief Intervention (corresponds directly with the 5 A's, Ask, Advise, Assess, Assist, and Arrange): Use examples below.  NA is not to be used.      Patient admitted drinking seven beers on a daily basis. Patient scored 11 on AUDIT-C. Patient admitted to smoking marijuana on a daily basis. Patient scored 2 on DAST. Patient expressed interest to decrease use of alcohol. Patient denied interest to decrease use of marijuana. Patient denied interest for outpatient substance abuse services. Patient denied having a plan to decrease use of alcohol.  continued to offer resource packet; patient accepted.  reviewed resource packet with patient; recommended [facility], and [facility].  encouraged patient to inform nurse of any changes; once informed a  will return to assist needs further.  informed patient of follow up call that will take place after discharge; patient confirmed phone number is accurate.     At- Risk Patients (Score 7-15 for women; 8-15 for men) MUST HAVE A BRIEF INTERVENTION IDENTIFIED  Discuss concern patient is drinking at unhealthy levels known to increase risk of alcohol-related health problems.    Is Patient ready to commit to change? YES    If No:  Encourage reflection  Discuss short term and long term health risks of consuming alcohol  Barriers to change  Reaffirm willingness to help / Educational or Resource materials provided    If Yes: (Score 7-15 for women; 8-15 for men)  Set

## 2025-07-29 NOTE — PROGRESS NOTES
Patient received sacramental anointing by a . If you are in need of  support, please call [phone]. If you are in need of a  after 6:30pm, please call the house supervisor for the on-call .      Isa Azevedo  [facility] Health Coordinator  [phone]

## 2025-07-29 NOTE — PROGRESS NOTES
Pt had potassium running, pt states iv site is burning very bad, this nurse attempted to start a new iv site with no success. This nurse educated pt on importance of potassium replacement. Resource Nurse Leann LAY was contacted, pt told resource nurse he did not want a new iv placed, resource attempted to decrease the rate of the iv infusing pt yelled and said it still burn. Provider notified

## 2025-07-29 NOTE — CARE COORDINATION
Case Management Assessment Initial Evaluation    Date/Time of Evaluation: 2025 8:12 AM  Assessment Completed by: Jessika Cantor RN    If patient is discharged prior to next notation, then this note serves as note for discharge by case management.    Patient Name: Anirudh Segal                   YOB: 1980  Diagnosis: Hypokalemia [E87.6]  Leg swelling [M79.89]  Leg pain, bilateral [M79.604, M79.605]                   Date / Time: 2025 10:57 AM  [facility]     Patient Admission Status: Inpatient   Readmission Risk Low 0-14, Mod 15-19), High > 20: Readmission Risk Score: 16.9    Current PCP: Brooke Sun MD  Health Care Decision Makers:   Primary Decision Maker: John Segal - Child - [phone]    Additional Case Management Notes: Presented with bilateral edema. Electrolyte imbalance. Replacement protocol. IVF. Thiamine. Folic acid.     Procedures: none    Imagin/28 US Liver: Distended gallbladder with calculus and sludge or debris without evidence   of acute cholecystitis.  Nonemergent/incidental findings above.   LE doppler: No evidence of deep venous thrombosis in either lower extremity.   CXR: No acute cardiopulmonary disease.       Patient Goals/Plan/Treatment Preferences: Spoke with Anirudh, reports he resides at home with son and does not use dme. Reports he drives and does not anticipate home needs.          25 1043   Service Assessment   Patient Orientation Alert and Oriented   Cognition Alert   History Provided By Patient   Primary Caregiver Self   Support Systems Children;Family Members   Patient's Healthcare Decision Maker is: Patient Declined (Legal Next of Kin Remains as Decision Maker)   PCP Verified by CM Yes   Last Visit to PCP Within last year   Prior Functional Level Independent in ADLs/IADLs   Current Functional Level Independent in ADLs/IADLs   Can patient return to prior living arrangement Yes   Ability to make needs known:

## 2025-07-29 NOTE — PLAN OF CARE
Problem: Discharge Planning  Goal: Discharge to home or other facility with appropriate resources  Outcome: Progressing     Problem: Seizure Precautions  Goal: Remains free of injury related to seizures activity  Outcome: Progressing     Problem: Pain  Goal: Verbalizes/displays adequate comfort level or baseline comfort level  Outcome: Progressing     Problem: Skin/Tissue Integrity  Goal: Skin integrity remains intact  Description: 1.  Monitor for areas of redness and/or skin breakdown  2.  Assess vascular access sites hourly  3.  Every 4-6 hours minimum:  Change oxygen saturation probe site  4.  Every 4-6 hours:  If on nasal continuous positive airway pressure, respiratory therapy assess nares and determine need for appliance change or resting period  Outcome: Progressing     Problem: Safety - Adult  Goal: Free from fall injury  Outcome: Progressing    Care plan reviewed with patient.  Patient verbalizes understanding of the plan of care and contributes to goal setting.

## 2025-07-29 NOTE — PLAN OF CARE
Problem: Discharge Planning  Goal: Discharge to home or other facility with appropriate resources  7/29/2025 0118 by Gila Gregory RN  Outcome: Progressing  Flowsheets (Taken 7/28/2025 1815 by Eduardo Pham RN)  Discharge to home or other facility with appropriate resources: Arrange for needed discharge resources and transportation as appropriate  7/28/2025 1815 by Eduardo Pham RN  Outcome: Progressing  Flowsheets (Taken 7/28/2025 1815)  Discharge to home or other facility with appropriate resources: Arrange for needed discharge resources and transportation as appropriate     Problem: Seizure Precautions  Goal: Remains free of injury related to seizures activity  7/29/2025 0118 by Gila Gregory RN  Outcome: Progressing  Flowsheets (Taken 7/28/2025 1815 by Eduardo Pham RN)  Remains free of injury related to seizure activity: Monitor patient for seizure activity, document and report duration and description of seizure to Licensed Independent Practitioner  7/28/2025 1815 by Eduardo Pham RN  Outcome: Progressing  Flowsheets (Taken 7/28/2025 1815)  Remains free of injury related to seizure activity: Monitor patient for seizure activity, document and report duration and description of seizure to Licensed Independent Practitioner     Problem: Pain  Goal: Verbalizes/displays adequate comfort level or baseline comfort level  7/29/2025 0118 by Gila Gregory RN  Outcome: Progressing  Flowsheets (Taken 7/28/2025 1815 by Eduardo Pham RN)  Verbalizes/displays adequate comfort level or baseline comfort level: Encourage patient to monitor pain and request assistance  7/28/2025 1815 by Eduardo Pham RN  Outcome: Progressing  Flowsheets (Taken 7/28/2025 1815)  Verbalizes/displays adequate comfort level or baseline comfort level: Encourage patient to monitor pain and request assistance     Problem: Skin/Tissue Integrity  Goal: Skin integrity remains intact  Description: 1.  Monitor for areas of redness and/or skin

## 2025-07-30 ENCOUNTER — APPOINTMENT (OUTPATIENT)
Age: 45
End: 2025-07-30
Attending: NURSE PRACTITIONER
Payer: COMMERCIAL

## 2025-07-30 LAB
ALBUMIN SERPL BCG-MCNC: 3.2 G/DL (ref 3.4–4.9)
ALP SERPL-CCNC: 127 U/L (ref 40–129)
ALT SERPL W/O P-5'-P-CCNC: 27 U/L (ref 10–50)
ANION GAP SERPL CALC-SCNC: 10 MEQ/L (ref 8–16)
AST SERPL-CCNC: 209 U/L (ref 10–50)
BILIRUB SERPL-MCNC: 0.3 MG/DL (ref 0.3–1.2)
BUN SERPL-MCNC: 15 MG/DL (ref 8–23)
CALCIUM SERPL-MCNC: 8.9 MG/DL (ref 8.6–10)
CHLORIDE SERPL-SCNC: 96 MEQ/L (ref 98–111)
CO2 SERPL-SCNC: 33 MEQ/L (ref 22–29)
CREAT SERPL-MCNC: 0.5 MG/DL (ref 0.7–1.2)
DEPRECATED RDW RBC AUTO: 59.6 FL (ref 35–45)
ECHO AO ASC DIAM: 2.5 CM
ECHO AO ASCENDING AORTA INDEX: 1.37 CM/M2
ECHO AR MAX VEL PISA: 4.3 M/S
ECHO AV CUSP MM: 2 CM
ECHO AV PEAK GRADIENT: 5 MMHG
ECHO AV PEAK VELOCITY: 1.1 M/S
ECHO AV REGURGITANT PHT: 253 MS
ECHO AV VELOCITY RATIO: 0.64
ECHO BSA: 1.84 M2
ECHO EST RA PRESSURE: 3 MMHG
ECHO LA AREA 2C: 8.2 CM2
ECHO LA AREA 4C: 11.9 CM2
ECHO LA DIAMETER INDEX: 1.32 CM/M2
ECHO LA DIAMETER: 2.4 CM
ECHO LA MAJOR AXIS: 4.7 CM
ECHO LA MINOR AXIS: 3.2 CM
ECHO LA VOL MOD A2C: 18 ML (ref 18–58)
ECHO LA VOL MOD A4C: 23 ML (ref 18–58)
ECHO LA VOLUME INDEX MOD A2C: 10 ML/M2 (ref 16–34)
ECHO LA VOLUME INDEX MOD A4C: 13 ML/M2 (ref 16–34)
ECHO LV EDV A2C: 53 ML
ECHO LV EDV A4C: 77 ML
ECHO LV EDV INDEX A4C: 42 ML/M2
ECHO LV EDV NDEX A2C: 29 ML/M2
ECHO LV EF PHYSICIAN: 45 %
ECHO LV EJECTION FRACTION A2C: 41 %
ECHO LV EJECTION FRACTION A4C: 43 %
ECHO LV EJECTION FRACTION BIPLANE: 42 % (ref 55–100)
ECHO LV ESV A2C: 31 ML
ECHO LV ESV A4C: 43 ML
ECHO LV ESV INDEX A2C: 17 ML/M2
ECHO LV ESV INDEX A4C: 24 ML/M2
ECHO LV FRACTIONAL SHORTENING: 25 % (ref 28–44)
ECHO LV INTERNAL DIMENSION DIASTOLE INDEX: 2.42 CM/M2
ECHO LV INTERNAL DIMENSION DIASTOLIC: 4.4 CM (ref 4.2–5.9)
ECHO LV INTERNAL DIMENSION SYSTOLIC INDEX: 1.81 CM/M2
ECHO LV INTERNAL DIMENSION SYSTOLIC: 3.3 CM
ECHO LV ISOVOLUMETRIC RELAXATION TIME (IVRT): 42 MS
ECHO LV IVSD: 0.8 CM (ref 0.6–1)
ECHO LV MASS 2D: 109.4 G (ref 88–224)
ECHO LV MASS INDEX 2D: 60.1 G/M2 (ref 49–115)
ECHO LV POSTERIOR WALL DIASTOLIC: 0.8 CM (ref 0.6–1)
ECHO LV RELATIVE WALL THICKNESS RATIO: 0.36
ECHO LVOT PEAK GRADIENT: 2 MMHG
ECHO LVOT PEAK VELOCITY: 0.7 M/S
ECHO MV A VELOCITY: 1.25 M/S
ECHO MV E DECELERATION TIME (DT): 45 MS
ECHO MV E VELOCITY: 0.4 M/S
ECHO MV E/A RATIO: 0.32
ECHO MV REGURGITANT PEAK GRADIENT: 77 MMHG
ECHO MV REGURGITANT PEAK VELOCITY: 4.4 M/S
ECHO PV MAX VELOCITY: 0.7 M/S
ECHO PV PEAK GRADIENT: 2 MMHG
ECHO RV INTERNAL DIMENSION: 2 CM
ECHO RV TAPSE: 1.4 CM (ref 1.7–?)
ERYTHROCYTE [DISTWIDTH] IN BLOOD BY AUTOMATED COUNT: 15 % (ref 11.5–14.5)
GFR SERPL CREATININE-BSD FRML MDRD: > 90 ML/MIN/1.73M2
GLUCOSE SERPL-MCNC: 101 MG/DL (ref 74–109)
HCT VFR BLD AUTO: 25.9 % (ref 42–52)
HGB BLD-MCNC: 8.8 GM/DL (ref 14–18)
MAGNESIUM SERPL-MCNC: 2.1 MG/DL (ref 1.6–2.6)
MCH RBC QN AUTO: 36.5 PG (ref 26–33)
MCHC RBC AUTO-ENTMCNC: 34 GM/DL (ref 32.2–35.5)
MCV RBC AUTO: 107.5 FL (ref 80–94)
PLATELET # BLD AUTO: 159 THOU/MM3 (ref 130–400)
PMV BLD AUTO: 10.2 FL (ref 9.4–12.4)
POTASSIUM SERPL-SCNC: 3.5 MEQ/L (ref 3.5–5.2)
PROT SERPL-MCNC: 5.7 G/DL (ref 6.4–8.3)
RBC # BLD AUTO: 2.41 MILL/MM3 (ref 4.7–6.1)
SODIUM SERPL-SCNC: 139 MEQ/L (ref 135–145)
WBC # BLD AUTO: 7.1 THOU/MM3 (ref 4.8–10.8)

## 2025-07-30 PROCEDURE — 99232 SBSQ HOSP IP/OBS MODERATE 35: CPT | Performed by: NURSE PRACTITIONER

## 2025-07-30 PROCEDURE — 80053 COMPREHEN METABOLIC PANEL: CPT

## 2025-07-30 PROCEDURE — 6370000000 HC RX 637 (ALT 250 FOR IP): Performed by: PHYSICIAN ASSISTANT

## 2025-07-30 PROCEDURE — 93306 TTE W/DOPPLER COMPLETE: CPT | Performed by: INTERNAL MEDICINE

## 2025-07-30 PROCEDURE — 83735 ASSAY OF MAGNESIUM: CPT

## 2025-07-30 PROCEDURE — 1200000000 HC SEMI PRIVATE

## 2025-07-30 PROCEDURE — 6360000002 HC RX W HCPCS: Performed by: PHYSICIAN ASSISTANT

## 2025-07-30 PROCEDURE — 6370000000 HC RX 637 (ALT 250 FOR IP): Performed by: NURSE PRACTITIONER

## 2025-07-30 PROCEDURE — 36415 COLL VENOUS BLD VENIPUNCTURE: CPT

## 2025-07-30 PROCEDURE — 93306 TTE W/DOPPLER COMPLETE: CPT

## 2025-07-30 PROCEDURE — 2500000003 HC RX 250 WO HCPCS: Performed by: PHYSICIAN ASSISTANT

## 2025-07-30 PROCEDURE — 85027 COMPLETE CBC AUTOMATED: CPT

## 2025-07-30 RX ORDER — ERGOCALCIFEROL 1.25 MG/1
50000 CAPSULE, LIQUID FILLED ORAL WEEKLY
Status: DISCONTINUED | OUTPATIENT
Start: 2025-08-02 | End: 2025-08-01 | Stop reason: HOSPADM

## 2025-07-30 RX ORDER — DOXAZOSIN 1 MG/1
1 TABLET ORAL NIGHTLY
Status: DISCONTINUED | OUTPATIENT
Start: 2025-07-30 | End: 2025-08-01 | Stop reason: HOSPADM

## 2025-07-30 RX ORDER — GABAPENTIN 400 MG/1
400 CAPSULE ORAL 3 TIMES DAILY
Status: DISCONTINUED | OUTPATIENT
Start: 2025-07-30 | End: 2025-08-01 | Stop reason: HOSPADM

## 2025-07-30 RX ADMIN — ENOXAPARIN SODIUM 40 MG: 100 INJECTION SUBCUTANEOUS at 09:04

## 2025-07-30 RX ADMIN — KETOROLAC TROMETHAMINE 15 MG: 30 INJECTION, SOLUTION INTRAMUSCULAR at 16:29

## 2025-07-30 RX ADMIN — GABAPENTIN 300 MG: 300 CAPSULE ORAL at 09:03

## 2025-07-30 RX ADMIN — LEVETIRACETAM 500 MG: 500 SOLUTION ORAL at 09:03

## 2025-07-30 RX ADMIN — DOXAZOSIN 1 MG: 1 TABLET ORAL at 20:28

## 2025-07-30 RX ADMIN — Medication 4.5 MG: at 20:20

## 2025-07-30 RX ADMIN — GABAPENTIN 300 MG: 300 CAPSULE ORAL at 13:35

## 2025-07-30 RX ADMIN — HYDROXYZINE HYDROCHLORIDE 25 MG: 25 TABLET ORAL at 20:20

## 2025-07-30 RX ADMIN — SODIUM CHLORIDE, PRESERVATIVE FREE 10 ML: 5 INJECTION INTRAVENOUS at 09:04

## 2025-07-30 RX ADMIN — Medication 100 MG: at 09:03

## 2025-07-30 RX ADMIN — LEVOTHYROXINE SODIUM 50 MCG: 0.05 TABLET ORAL at 06:19

## 2025-07-30 RX ADMIN — SODIUM CHLORIDE, PRESERVATIVE FREE 10 ML: 5 INJECTION INTRAVENOUS at 20:25

## 2025-07-30 RX ADMIN — FOLIC ACID 1 MG: 1 TABLET ORAL at 09:03

## 2025-07-30 RX ADMIN — BACLOFEN 10 MG: 10 TABLET ORAL at 12:19

## 2025-07-30 RX ADMIN — LEVETIRACETAM 500 MG: 500 SOLUTION ORAL at 20:19

## 2025-07-30 RX ADMIN — GABAPENTIN 400 MG: 400 CAPSULE ORAL at 20:28

## 2025-07-30 RX ADMIN — KETOROLAC TROMETHAMINE 15 MG: 30 INJECTION, SOLUTION INTRAMUSCULAR at 10:17

## 2025-07-30 ASSESSMENT — PAIN DESCRIPTION - DESCRIPTORS
DESCRIPTORS: ACHING
DESCRIPTORS: ACHING;NUMBNESS;TINGLING
DESCRIPTORS: ACHING
DESCRIPTORS: ACHING;PINS AND NEEDLES;DISCOMFORT

## 2025-07-30 ASSESSMENT — PAIN DESCRIPTION - LOCATION
LOCATION: LEG
LOCATION: LEG
LOCATION: LEG;FOOT
LOCATION: FOOT;LEG

## 2025-07-30 ASSESSMENT — PAIN SCALES - GENERAL
PAINLEVEL_OUTOF10: 8
PAINLEVEL_OUTOF10: 8
PAINLEVEL_OUTOF10: 7
PAINLEVEL_OUTOF10: 6
PAINLEVEL_OUTOF10: 6

## 2025-07-30 ASSESSMENT — PAIN - FUNCTIONAL ASSESSMENT
PAIN_FUNCTIONAL_ASSESSMENT: ACTIVITIES ARE NOT PREVENTED
PAIN_FUNCTIONAL_ASSESSMENT: ACTIVITIES ARE NOT PREVENTED

## 2025-07-30 ASSESSMENT — PAIN DESCRIPTION - ORIENTATION
ORIENTATION: RIGHT;LEFT

## 2025-07-30 NOTE — PLAN OF CARE
Problem: Discharge Planning  Goal: Discharge to home or other facility with appropriate resources  7/30/2025 1338 by Wendy Gonzalez RN  Outcome: Progressing  Flowsheets (Taken 7/30/2025 1338)  Discharge to home or other facility with appropriate resources:   Identify barriers to discharge with patient and caregiver   Arrange for needed discharge resources and transportation as appropriate   Identify discharge learning needs (meds, wound care, etc)  Note: Patient plans to discharge home when medically stable.      Problem: Seizure Precautions  Goal: Remains free of injury related to seizures activity  7/30/2025 1338 by Wendy Gonzalez RN  Outcome: Progressing  Flowsheets (Taken 7/30/2025 1338)  Remains free of injury related to seizure activity:   Monitor patient for seizure activity, document and report duration and description of seizure to Licensed Independent Practitioner   Instruct patient/family to call for assistance with activity based on assessment  Note: Patient has history of seizures. Anti seizure medications given per MAR. Patient remains free of seizure activity at this time.      Problem: Pain  Goal: Verbalizes/displays adequate comfort level or baseline comfort level  7/30/2025 1338 by Wendy Gonzalez RN  Outcome: Progressing  Flowsheets (Taken 7/30/2025 1338)  Verbalizes/displays adequate comfort level or baseline comfort level:   Encourage patient to monitor pain and request assistance   Assess pain using appropriate pain scale   Administer analgesics based on type and severity of pain and evaluate response  Note: Patient complaining of pain this shift. Prn pain medication available and given. Will monitor.      Problem: Skin/Tissue Integrity  Goal: Skin integrity remains intact  Description: 1.  Monitor for areas of redness and/or skin breakdown  2.  Assess vascular access sites hourly  3.  Every 4-6 hours minimum:  Change oxygen saturation probe site  4.  Every 4-6 hours:  If on

## 2025-07-30 NOTE — CARE COORDINATION
7/30/25, 2:26 PM EDT    DISCHARGE ON GOING EVALUATION    Anirudh [facility] day: 2  Location: 6K-04/004-A Reason for admit: Hypokalemia [E87.6]  Leg swelling [M79.89]  Leg pain, bilateral [M79.604, M79.605]     Procedures: none    Imaging since last note: n/a    Barriers to Discharge: K+3.5. Tachy 103-117. CIWA protocol. Echo pending.     PCP: Brooke Sun MD  Readmission Risk Score: 18.3    Patient Goals/Plan/Treatment Preferences: Met with Anirudh, he plans to return home with his 23 y.o son at discharge. He states he has a friend who assists him with personal care. He is interested in HH services for PT/OT, prefers [facility] as he has used them in the past. Referral placed in [facility].
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
## 2025-07-30 NOTE — PLAN OF CARE
Problem: Discharge Planning  Goal: Discharge to home or other facility with appropriate resources  7/30/2025 0130 by Jerrod Carrasco RN  Outcome: Progressing  7/29/2025 1840 by Rehan Montanez RN  Outcome: Progressing     Problem: Seizure Precautions  Goal: Remains free of injury related to seizures activity  7/30/2025 0130 by Jerrod Carrasco RN  Outcome: Progressing  7/29/2025 1840 by Rehan Montanez RN  Outcome: Progressing     Problem: Pain  Goal: Verbalizes/displays adequate comfort level or baseline comfort level  7/30/2025 0130 by Jerrod Carrasco RN  Outcome: Progressing  7/29/2025 1840 by Rehan Montanez RN  Outcome: Progressing     Problem: Skin/Tissue Integrity  Goal: Skin integrity remains intact  7/30/2025 0130 by Jerrod Carrasco RN  Outcome: Progressing  7/29/2025 1840 by Rehan Montanez RN  Outcome: Progressing     Problem: Safety - Adult  Goal: Free from fall injury  7/30/2025 0130 by Jerrod Carrasco RN  Outcome: Progressing  7/29/2025 1840 by Rehan Montanez RN  Outcome: Progressing      · Wound care

## 2025-07-30 NOTE — PROGRESS NOTES
Hospitalist Progress Note      Patient:  Anirudh Segal 44 y.o. male       : 1980  Unit/Bed:-04/004-A    Date of Admission: 2025      ASSESSMENT AND PLAN    Active Problems  Electrolyte abnormalities: Hyponatremic, hypochloremic, hypokalemic on arrival with elevated anion gap.  Likely secondary to dehydration and ETOH abuse.  Treated with IV hydration of Lactated Ringers and electrolyte replacement protocols.  Potassium up to 3.5 today, chloride 96, sodium 139.  Gap closed at 10.  Repeat labs in the AM.     Sinus tachycardia: Consistent heart rate above 100.   Upon reviewing chart, he has been tachycardic continuously for over the last year, actually since 2017.  Will obtain Echo  Bilateral leg pain and swelling: Dependent swelling has resolved, however neuropathic type pain persists.  Reports that the \"pins and needles\" feeling is to his feet bilaterally.  Admits to not taking Neurontin at home for \"awhile.  Gene hose in place.  Venous ultrasound negative for DVT. BNP wnl.  Muscle cramps likely secondary to electrolyte abnormalities vs neuropathic pain from noncompliance with Neurontin.  Continue Neurontin as previously prescribed.  Consult to PT, OT to eval and treat.    Elevated AST:  Secondary to chronic alcohol use.  Liver ultrasound showed hepatomegaly.  Mildly nodular liver suggestive of cirrhosis, echogenic liver could be hepatic steatosis or hepatocellular disease. INR wnl. No ascites.  Pt educated on progression of liver disease with continued alcohol abuse. CMP daily.   Macrocytic anemia, folate deficiency:  Folic acid added.  No signs of acute bleeding.  CBC in a.m.  Chronic alcohol abuse:  Denies plan for quitting at this time. Monitor for signs of withdrawal. Ativan as needed.  Continue thiamine, folic acid, multivitamin.  Addiction services consult      Chronic Conditions (reviewed, stable, and home medications resumed, unless otherwise stated)  Right sided ronal-paresis.

## 2025-07-31 LAB
ALBUMIN SERPL BCG-MCNC: 3.1 G/DL (ref 3.4–4.9)
ALP SERPL-CCNC: 135 U/L (ref 40–129)
ALT SERPL W/O P-5'-P-CCNC: 41 U/L (ref 10–50)
ANION GAP SERPL CALC-SCNC: 12 MEQ/L (ref 8–16)
AST SERPL-CCNC: 220 U/L (ref 10–50)
BILIRUB SERPL-MCNC: 0.2 MG/DL (ref 0.3–1.2)
BUN SERPL-MCNC: 16 MG/DL (ref 8–23)
C CAYETANENSIS DNA SPEC QL NAA+PROBE: NOT DETECTED
CALCIUM SERPL-MCNC: 9.2 MG/DL (ref 8.6–10)
CAMPY SP DNA.DIARRHEA STL QL NAA+PROBE: NOT DETECTED
CHLORIDE SERPL-SCNC: 101 MEQ/L (ref 98–111)
CO2 SERPL-SCNC: 29 MEQ/L (ref 22–29)
CREAT SERPL-MCNC: 0.5 MG/DL (ref 0.7–1.2)
CRYPTOSP DNA SPEC QL NAA+PROBE: NOT DETECTED
DEPRECATED RDW RBC AUTO: 62.7 FL (ref 35–45)
E COLI O157H7 DNA SPEC QL NAA+PROBE: NORMAL
E HISTOLYT DNA SPEC QL NAA+PROBE: NOT DETECTED
EAEC PAA PLAS AGGR+AATA ST NAA+NON-PRB: NOT DETECTED
EC STX1+STX2 + H7 FLIC SPEC NAA+PROBE: NOT DETECTED
EPEC EAE GENE STL QL NAA+NON-PROBE: NOT DETECTED
ERYTHROCYTE [DISTWIDTH] IN BLOOD BY AUTOMATED COUNT: 15.6 % (ref 11.5–14.5)
ETEC LTA+ST1A+ST1B TOX ST NAA+NON-PROBE: NOT DETECTED
G LAMBLIA DNA SPEC QL NAA+PROBE: NOT DETECTED
GFR SERPL CREATININE-BSD FRML MDRD: > 90 ML/MIN/1.73M2
GLUCOSE SERPL-MCNC: 103 MG/DL (ref 74–109)
HADV DNA SPEC QL NAA+PROBE: NOT DETECTED
HASTV RNA SPEC QL NAA+PROBE: NOT DETECTED
HCT VFR BLD AUTO: 28 % (ref 42–52)
HGB BLD-MCNC: 9.1 GM/DL (ref 14–18)
MCH RBC QN AUTO: 36 PG (ref 26–33)
MCHC RBC AUTO-ENTMCNC: 32.5 GM/DL (ref 32.2–35.5)
MCV RBC AUTO: 110.7 FL (ref 80–94)
NOROVIRUS GI + GII RNA STL NAA+PROBE: NOT DETECTED
P SHIGELLOIDES DNA STL QL NAA+PROBE: NOT DETECTED
PLATELET # BLD AUTO: 214 THOU/MM3 (ref 130–400)
PMV BLD AUTO: 10 FL (ref 9.4–12.4)
POTASSIUM SERPL-SCNC: 3.5 MEQ/L (ref 3.5–5.2)
PROT SERPL-MCNC: 5.7 G/DL (ref 6.4–8.3)
RBC # BLD AUTO: 2.53 MILL/MM3 (ref 4.7–6.1)
RV RNA SPEC QL NAA+PROBE: NOT DETECTED
SALMONELLA DNA SPEC QL NAA+PROBE: NOT DETECTED
SAPOVIRUS RNA SPEC QL NAA+PROBE: NOT DETECTED
SCAN OF BLOOD SMEAR: NORMAL
SHIGELLA SP+EIEC IPAH ST NAA+NON-PROBE: NOT DETECTED
SODIUM SERPL-SCNC: 142 MEQ/L (ref 135–145)
V CHOLERAE DNA SPEC QL NAA+PROBE: NOT DETECTED
VIBRIO DNA SPEC NAA+PROBE: NOT DETECTED
WBC # BLD AUTO: 8.2 THOU/MM3 (ref 4.8–10.8)
Y ENTERO RECN STL QL NAA+PROBE: NOT DETECTED

## 2025-07-31 PROCEDURE — 87507 IADNA-DNA/RNA PROBE TQ 12-25: CPT

## 2025-07-31 PROCEDURE — 2500000003 HC RX 250 WO HCPCS: Performed by: PHYSICIAN ASSISTANT

## 2025-07-31 PROCEDURE — 80053 COMPREHEN METABOLIC PANEL: CPT

## 2025-07-31 PROCEDURE — 36415 COLL VENOUS BLD VENIPUNCTURE: CPT

## 2025-07-31 PROCEDURE — 6360000002 HC RX W HCPCS: Performed by: PHYSICIAN ASSISTANT

## 2025-07-31 PROCEDURE — 97166 OT EVAL MOD COMPLEX 45 MIN: CPT

## 2025-07-31 PROCEDURE — 97535 SELF CARE MNGMENT TRAINING: CPT

## 2025-07-31 PROCEDURE — 6370000000 HC RX 637 (ALT 250 FOR IP): Performed by: NURSE PRACTITIONER

## 2025-07-31 PROCEDURE — 6370000000 HC RX 637 (ALT 250 FOR IP): Performed by: PHYSICIAN ASSISTANT

## 2025-07-31 PROCEDURE — 1200000000 HC SEMI PRIVATE

## 2025-07-31 PROCEDURE — 85027 COMPLETE CBC AUTOMATED: CPT

## 2025-07-31 PROCEDURE — 99232 SBSQ HOSP IP/OBS MODERATE 35: CPT | Performed by: NURSE PRACTITIONER

## 2025-07-31 RX ORDER — METOPROLOL SUCCINATE 50 MG/1
50 TABLET, EXTENDED RELEASE ORAL DAILY
Status: DISCONTINUED | OUTPATIENT
Start: 2025-07-31 | End: 2025-08-01 | Stop reason: HOSPADM

## 2025-07-31 RX ORDER — LOPERAMIDE HYDROCHLORIDE 2 MG/1
2 CAPSULE ORAL 4 TIMES DAILY PRN
Status: DISCONTINUED | OUTPATIENT
Start: 2025-07-31 | End: 2025-08-01 | Stop reason: HOSPADM

## 2025-07-31 RX ADMIN — ENOXAPARIN SODIUM 40 MG: 100 INJECTION SUBCUTANEOUS at 09:53

## 2025-07-31 RX ADMIN — GABAPENTIN 400 MG: 400 CAPSULE ORAL at 20:33

## 2025-07-31 RX ADMIN — SODIUM CHLORIDE, PRESERVATIVE FREE 10 ML: 5 INJECTION INTRAVENOUS at 09:54

## 2025-07-31 RX ADMIN — LOPERAMIDE HYDROCHLORIDE 2 MG: 2 CAPSULE ORAL at 17:47

## 2025-07-31 RX ADMIN — LEVETIRACETAM 500 MG: 500 SOLUTION ORAL at 20:33

## 2025-07-31 RX ADMIN — GABAPENTIN 400 MG: 400 CAPSULE ORAL at 14:12

## 2025-07-31 RX ADMIN — Medication 4.5 MG: at 20:33

## 2025-07-31 RX ADMIN — LEVETIRACETAM 500 MG: 500 SOLUTION ORAL at 09:53

## 2025-07-31 RX ADMIN — HYDROXYZINE HYDROCHLORIDE 25 MG: 25 TABLET ORAL at 20:33

## 2025-07-31 RX ADMIN — HYDROCHLOROTHIAZIDE 25 MG: 25 TABLET ORAL at 09:53

## 2025-07-31 RX ADMIN — METOPROLOL SUCCINATE 50 MG: 50 TABLET, EXTENDED RELEASE ORAL at 11:43

## 2025-07-31 RX ADMIN — LOPERAMIDE HYDROCHLORIDE 2 MG: 2 CAPSULE ORAL at 10:02

## 2025-07-31 RX ADMIN — FOLIC ACID 1 MG: 1 TABLET ORAL at 09:53

## 2025-07-31 RX ADMIN — KETOROLAC TROMETHAMINE 15 MG: 30 INJECTION, SOLUTION INTRAMUSCULAR at 04:25

## 2025-07-31 RX ADMIN — DOXAZOSIN 1 MG: 1 TABLET ORAL at 20:33

## 2025-07-31 RX ADMIN — KETOROLAC TROMETHAMINE 15 MG: 30 INJECTION, SOLUTION INTRAMUSCULAR at 17:43

## 2025-07-31 RX ADMIN — Medication 625 MG: at 16:41

## 2025-07-31 RX ADMIN — GABAPENTIN 400 MG: 400 CAPSULE ORAL at 09:53

## 2025-07-31 RX ADMIN — Medication 10 ML: at 20:38

## 2025-07-31 RX ADMIN — LEVOTHYROXINE SODIUM 50 MCG: 0.05 TABLET ORAL at 04:30

## 2025-07-31 RX ADMIN — KETOROLAC TROMETHAMINE 15 MG: 30 INJECTION, SOLUTION INTRAMUSCULAR at 11:43

## 2025-07-31 RX ADMIN — Medication 100 MG: at 09:53

## 2025-07-31 ASSESSMENT — PAIN SCALES - GENERAL
PAINLEVEL_OUTOF10: 7
PAINLEVEL_OUTOF10: 6
PAINLEVEL_OUTOF10: 7

## 2025-07-31 ASSESSMENT — PAIN DESCRIPTION - LOCATION
LOCATION: FOOT

## 2025-07-31 ASSESSMENT — PAIN DESCRIPTION - ORIENTATION
ORIENTATION: RIGHT;LEFT

## 2025-07-31 ASSESSMENT — PAIN DESCRIPTION - DESCRIPTORS
DESCRIPTORS: ACHING;PINS AND NEEDLES
DESCRIPTORS: ACHING;PINS AND NEEDLES
DESCRIPTORS: ACHING
DESCRIPTORS: ACHING;PINS AND NEEDLES

## 2025-07-31 NOTE — PROGRESS NOTES
[facility]  INPATIENT OCCUPATIONAL THERAPY  STRZ RENAL TELEMETRY 6K  EVALUATION      Discharge Recommendations: Continue to assess pending progress (anticipate home with 24 hr and HHOT.)  Equipment Recommendations: No        Time In: 0841  Time Out: 927  Timed Code Treatment Minutes: 38 Minutes  Minutes: 46          Date: 2025  Patient Name: Anirudh Segal,   Gender: male      MRN: [id number]  : 1980  (44 y.o.)  Referring Practitioner: Michelle Parker APRN - CNP  Diagnosis: Hypokalemia  Additional Pertinent Hx: Per EMR: 44 y.o. 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  Patient states that this has been present for about 2 weeks, states he put on compression socks and his swelling resolved and pain improved.  Noted to return about a week ago.  Patient currently does not have an CAMILO hose.  Has pain to palpation to bilateral lower extremities.  Patient states pain goes up to his knees.  Patient reports drinking approximately 1 pint of liquor daily and marijuana use.  States he eats \"a lot of ice,\" otherwise states he eats and drinks okay, but not a lot.    Restrictions/Precautions:  Position Activity Restriction  Other Position/Activity Restrictions: chronic R ronal    Subjective  Chart Reviewed: Yes, Orders, Progress Notes, History and Physical  Patient assessed for rehabilitation services?: Yes    Subjective: Per RN, elan for therapy. Pt in bed upon therapist arrival w/ alarm on. Pt agreeable to OT session without additional encouragement or education. Pleasant and cooperative. Alert and oriented x4. Pt with decreased attention span, poor safety awareness, and delayed processing. At end of session, pt up in the chair

## 2025-07-31 NOTE — PROGRESS NOTES
Hospitalist Progress Note      Patient:  Anirudh Segal 44 y.o. male       : 1980  Unit/Bed:[facility]04/004-A    Date of Admission: 2025      ASSESSMENT AND PLAN    Active Problems  Electrolyte abnormalities - resolved: Hyponatremic, hypochloremic, hypokalemic on arrival with elevated anion gap.  Likely secondary to dehydration and ETOH abuse.  Treated with IV hydration of Lactated Ringers and electrolyte replacement protocols.  Potassium up to 3.5 today, chloride 96, sodium 139.  Gap closed at 10.  Repeat labs remain stable.     Sinus tachycardia: Consistent heart rate above 100.   Upon reviewing chart, he has been tachycardic continuously for over the last year, actually since 2017.  Echo obtained yesterday evening, indicates EF of 40-45%, grade 1 diastolic dysfunction.  Start Toprol XL 50 daily and monitor  Bilateral leg pain and swelling: Dependent swelling has resolved, however neuropathic type pain persists.  Reports that the \"pins and needles\" feeling is to his feet bilaterally.  Admits to not taking Neurontin at home for \"awhile.  Gene hose in place.  Venous ultrasound negative for DVT. BNP wnl.  Muscle cramps likely secondary to electrolyte abnormalities vs neuropathic pain from noncompliance with Neurontin.  Increased Neurontin to 400 mg TID.  Continue PT, OT.   Diarrhea: New onset since admission.  Multiple loose, watery stools daily.  Send stool for molecular panel and if negative, start Imodium and Fiber tabs.     Elevated AST:  Secondary to chronic alcohol use.  Liver ultrasound showed hepatomegaly.  Mildly nodular liver suggestive of cirrhosis, echogenic liver could be hepatic steatosis or hepatocellular disease. INR wnl. No ascites.  Pt educated on progression of liver disease with continued alcohol abuse. CMP daily.   Macrocytic anemia, folate deficiency:  Folic acid added.  No signs of acute bleeding.  CBC in a.m.  Chronic alcohol abuse:  Denies plan for quitting at this time.  105   Temp 98.7 °F (37.1 °C) (Oral)   Resp 20   Ht 1.676 m (5' 6\")   Wt 73.8 kg (162 lb 11.2 oz)   SpO2 96%   BMI 26.26 kg/m²   General: No distress, appears stated age.   Eyes:  PERRL. Conjunctivae/corneas clear.  HENT: Head normal appearing. Nares normal. Oral mucosa moist.  Hearing intact.   Neck: Supple, with full range of motion. Trachea midline.  No gross JVD appreciated.  Respiratory:  Normal effort. Clear to auscultation, without rales or wheezes or rhonchi.  Cardiovascular: Normal rate, regular rhythm with normal S1/S2 without murmurs.    No lower extremity edema.   Abdomen: Soft, non-tender, non-distended with normal bowel sounds.  Musculoskeletal: No joint swelling or tenderness. Normal tone. No abnormal movements.  Bilateral feet tender to palpation  Skin: Warm and dry. No rashes or lesions.  Neurologic:  No focal sensory/motor deficits in the upper or lower extremities. Cranial nerves:  grossly non-focal 2-12.   Chronic right-sided hemiparesis  Psychiatric: Alert and oriented, normal insight and thought content.   Capillary Refill: Brisk,< 3 seconds.  Peripheral Pulses: +2 palpable, equal bilaterally.       Labs/Radiology: See chart or assessment above.     Electronically signed by FRANCE Ulloa CNP on 7/31/2025 at 8:08 AM

## 2025-07-31 NOTE — PLAN OF CARE
Problem: Discharge Planning  Goal: Discharge to home or other facility with appropriate resources  Outcome: Progressing  Flowsheets (Taken 7/31/2025 1552)  Discharge to home or other facility with appropriate resources:   Identify barriers to discharge with patient and caregiver   Arrange for needed discharge resources and transportation as appropriate   Identify discharge learning needs (meds, wound care, etc)  Note: Patient plans to discharge home when medically stable.      Problem: Seizure Precautions  Goal: Remains free of injury related to seizures activity  Outcome: Progressing  Flowsheets (Taken 7/31/2025 1552)  Remains free of injury related to seizure activity:   Maintain airway, patient safety  and administer oxygen as ordered   If seizure occurs, turn patient to side and suction secretions as needed   Monitor patient for seizure activity, document and report duration and description of seizure to Licensed Independent Practitioner  Note: Patient has history of seizures. Anti-seizure medications per MAR. No evidence of seizure activity at this time.      Problem: Pain  Goal: Verbalizes/displays adequate comfort level or baseline comfort level  Outcome: Progressing  Flowsheets (Taken 7/31/2025 1552)  Verbalizes/displays adequate comfort level or baseline comfort level:   Encourage patient to monitor pain and request assistance   Assess pain using appropriate pain scale   Administer analgesics based on type and severity of pain and evaluate response  Note: Patient complaining of pain this shift. Prn pain medication available and given. Will monitor.      Problem: Skin/Tissue Integrity  Goal: Skin integrity remains intact  Description: 1.  Monitor for areas of redness and/or skin breakdown  2.  Assess vascular access sites hourly  3.  Every 4-6 hours minimum:  Change oxygen saturation probe site  4.  Every 4-6 hours:  If on nasal continuous positive airway pressure, respiratory therapy assess nares and

## 2025-07-31 NOTE — PLAN OF CARE
Problem: Discharge Planning  Goal: Discharge to home or other facility with appropriate resources  7/30/2025 2033 by Deirdre Dumont, RN  Outcome: Not Progressing  7/30/2025 1338 by Wendy Gonzalez RN  Outcome: Progressing  Flowsheets (Taken 7/30/2025 1338)  Discharge to home or other facility with appropriate resources:   Identify barriers to discharge with patient and caregiver   Arrange for needed discharge resources and transportation as appropriate   Identify discharge learning needs (meds, wound care, etc)  Note: Patient plans to discharge home when medically stable.      Problem: Seizure Precautions  Goal: Remains free of injury related to seizures activity  7/30/2025 2033 by Deirdre Dumont, RN  Outcome: Not Progressing  7/30/2025 1338 by Wendy Gonzalez RN  Outcome: Progressing  Flowsheets (Taken 7/30/2025 1338)  Remains free of injury related to seizure activity:   Monitor patient for seizure activity, document and report duration and description of seizure to Licensed Independent Practitioner   Instruct patient/family to call for assistance with activity based on assessment  Note: Patient has history of seizures. Anti seizure medications given per MAR. Patient remains free of seizure activity at this time.      Problem: Pain  Goal: Verbalizes/displays adequate comfort level or baseline comfort level  7/30/2025 2033 by Deirdre Dumont, RN  Outcome: Not Progressing  7/30/2025 1338 by Wendy Gonzalez RN  Outcome: Progressing  Flowsheets (Taken 7/30/2025 1338)  Verbalizes/displays adequate comfort level or baseline comfort level:   Encourage patient to monitor pain and request assistance   Assess pain using appropriate pain scale   Administer analgesics based on type and severity of pain and evaluate response  Note: Patient complaining of pain this shift. Prn pain medication available and given. Will monitor.

## 2025-08-01 VITALS
HEART RATE: 97 BPM | RESPIRATION RATE: 20 BRPM | BODY MASS INDEX: 26.47 KG/M2 | DIASTOLIC BLOOD PRESSURE: 55 MMHG | TEMPERATURE: 98.4 F | OXYGEN SATURATION: 98 % | HEIGHT: 66 IN | SYSTOLIC BLOOD PRESSURE: 97 MMHG | WEIGHT: 164.68 LBS

## 2025-08-01 LAB
ALBUMIN SERPL BCG-MCNC: 2.9 G/DL (ref 3.4–4.9)
ALP SERPL-CCNC: 123 U/L (ref 40–129)
ALT SERPL W/O P-5'-P-CCNC: 29 U/L (ref 10–50)
ANION GAP SERPL CALC-SCNC: 13 MEQ/L (ref 8–16)
AST SERPL-CCNC: 101 U/L (ref 10–50)
BILIRUB SERPL-MCNC: < 0.2 MG/DL (ref 0.3–1.2)
BUN SERPL-MCNC: 16 MG/DL (ref 8–23)
CALCIUM SERPL-MCNC: 9.1 MG/DL (ref 8.6–10)
CHLORIDE SERPL-SCNC: 103 MEQ/L (ref 98–111)
CO2 SERPL-SCNC: 27 MEQ/L (ref 22–29)
CREAT SERPL-MCNC: 0.6 MG/DL (ref 0.7–1.2)
DEPRECATED RDW RBC AUTO: 62.6 FL (ref 35–45)
ERYTHROCYTE [DISTWIDTH] IN BLOOD BY AUTOMATED COUNT: 15.5 % (ref 11.5–14.5)
GFR SERPL CREATININE-BSD FRML MDRD: > 90 ML/MIN/1.73M2
GLUCOSE SERPL-MCNC: 109 MG/DL (ref 74–109)
HCT VFR BLD AUTO: 27.4 % (ref 42–52)
HGB BLD-MCNC: 9.1 GM/DL (ref 14–18)
MCH RBC QN AUTO: 36.4 PG (ref 26–33)
MCHC RBC AUTO-ENTMCNC: 33.2 GM/DL (ref 32.2–35.5)
MCV RBC AUTO: 109.6 FL (ref 80–94)
PLATELET # BLD AUTO: 267 THOU/MM3 (ref 130–400)
PMV BLD AUTO: 9.7 FL (ref 9.4–12.4)
POTASSIUM SERPL-SCNC: 3.5 MEQ/L (ref 3.5–5.2)
PROT SERPL-MCNC: 5.5 G/DL (ref 6.4–8.3)
RBC # BLD AUTO: 2.5 MILL/MM3 (ref 4.7–6.1)
SODIUM SERPL-SCNC: 143 MEQ/L (ref 135–145)
WBC # BLD AUTO: 6.3 THOU/MM3 (ref 4.8–10.8)

## 2025-08-01 PROCEDURE — 97530 THERAPEUTIC ACTIVITIES: CPT

## 2025-08-01 PROCEDURE — 36415 COLL VENOUS BLD VENIPUNCTURE: CPT

## 2025-08-01 PROCEDURE — 6370000000 HC RX 637 (ALT 250 FOR IP): Performed by: PHYSICIAN ASSISTANT

## 2025-08-01 PROCEDURE — 97110 THERAPEUTIC EXERCISES: CPT

## 2025-08-01 PROCEDURE — 80053 COMPREHEN METABOLIC PANEL: CPT

## 2025-08-01 PROCEDURE — 97162 PT EVAL MOD COMPLEX 30 MIN: CPT

## 2025-08-01 PROCEDURE — 6360000002 HC RX W HCPCS: Performed by: PHYSICIAN ASSISTANT

## 2025-08-01 PROCEDURE — 97116 GAIT TRAINING THERAPY: CPT

## 2025-08-01 PROCEDURE — 85027 COMPLETE CBC AUTOMATED: CPT

## 2025-08-01 PROCEDURE — 6370000000 HC RX 637 (ALT 250 FOR IP): Performed by: NURSE PRACTITIONER

## 2025-08-01 PROCEDURE — 99239 HOSP IP/OBS DSCHRG MGMT >30: CPT | Performed by: NURSE PRACTITIONER

## 2025-08-01 PROCEDURE — 97535 SELF CARE MNGMENT TRAINING: CPT

## 2025-08-01 RX ORDER — LOPERAMIDE HYDROCHLORIDE 2 MG/1
2 CAPSULE ORAL 4 TIMES DAILY PRN
COMMUNITY
Start: 2025-08-01 | End: 2025-08-11

## 2025-08-01 RX ORDER — METOPROLOL SUCCINATE 50 MG/1
50 TABLET, EXTENDED RELEASE ORAL DAILY
Qty: 30 TABLET | Refills: 3 | Status: SHIPPED | OUTPATIENT
Start: 2025-08-02

## 2025-08-01 RX ADMIN — METOPROLOL SUCCINATE 50 MG: 50 TABLET, EXTENDED RELEASE ORAL at 08:13

## 2025-08-01 RX ADMIN — LEVETIRACETAM 500 MG: 500 SOLUTION ORAL at 08:13

## 2025-08-01 RX ADMIN — POTASSIUM BICARBONATE 40 MEQ: 782 TABLET, EFFERVESCENT ORAL at 08:13

## 2025-08-01 RX ADMIN — FOLIC ACID 1 MG: 1 TABLET ORAL at 08:13

## 2025-08-01 RX ADMIN — Medication 625 MG: at 08:13

## 2025-08-01 RX ADMIN — GABAPENTIN 400 MG: 400 CAPSULE ORAL at 08:13

## 2025-08-01 RX ADMIN — ENOXAPARIN SODIUM 40 MG: 100 INJECTION SUBCUTANEOUS at 08:18

## 2025-08-01 RX ADMIN — LEVOTHYROXINE SODIUM 50 MCG: 0.05 TABLET ORAL at 06:11

## 2025-08-01 RX ADMIN — AMLODIPINE BESYLATE 10 MG: 10 TABLET ORAL at 08:13

## 2025-08-01 RX ADMIN — HYDROCHLOROTHIAZIDE 25 MG: 25 TABLET ORAL at 08:13

## 2025-08-01 RX ADMIN — KETOROLAC TROMETHAMINE 15 MG: 30 INJECTION, SOLUTION INTRAMUSCULAR at 01:18

## 2025-08-01 RX ADMIN — Medication 100 MG: at 08:13

## 2025-08-01 ASSESSMENT — PAIN SCALES - GENERAL
PAINLEVEL_OUTOF10: 8
PAINLEVEL_OUTOF10: 6

## 2025-08-01 ASSESSMENT — PAIN DESCRIPTION - ORIENTATION: ORIENTATION: RIGHT;LEFT

## 2025-08-01 ASSESSMENT — PAIN DESCRIPTION - DESCRIPTORS: DESCRIPTORS: ACHING

## 2025-08-01 ASSESSMENT — PAIN DESCRIPTION - LOCATION: LOCATION: FOOT

## 2025-08-01 ASSESSMENT — PAIN - FUNCTIONAL ASSESSMENT: PAIN_FUNCTIONAL_ASSESSMENT: ACTIVITIES ARE NOT PREVENTED

## 2025-08-01 NOTE — PROGRESS NOTES
[facility]  INPATIENT PHYSICAL THERAPY  EVALUATION  STRZ RENAL TELEMETRY 6K - [id number]    Discharge Recommendations: Outpatient PT  Equipment Recommendations: No               Time In: 0824  Time Out: 0855  Timed Code Treatment Minutes: 23 Minutes  Minutes: 31          Date: 2025  Patient Name: Anirudh Segal,  Gender:  male        MRN: [id number]  : 1980  (44 y.o.)      Referring Practitioner: Michelle Parker APRN - CNP  Diagnosis: Hypokalemia  Additional Pertinent Hx: Per EMR: Anirudh Segal is a 44 y.o. 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  Patient states that this has been present for about 2 weeks, states he put on compression socks and his swelling resolved and pain improved.  Noted to return about a week ago.  Patient currently does not have an CAMILO hose.  Has pain to palpation to bilateral lower extremities.  Patient states pain goes up to his knees.  Patient reports drinking approximately 1 pint of liquor daily and marijuana use.  States he eats \"a lot of ice,\" otherwise states he eats and drinks okay, but not a lot.  Denies any fever/chills, shortness of breath, chest pain.  Denies abdominal pain, N/V/D.     Restrictions/Precautions:  Restrictions/Precautions: Fall Risk, General Precautions       Other Position/Activity Restrictions: chronic R ronal           Subjective:  Chart Reviewed: Yes  Patient assessed for rehabilitation services?: Yes  Family/Caregiver Present: No  Subjective: RN approved session. Pt agreed for evaluation, he stated urgent need to use the restroom, initiated mobility quickly to complete this. He was open to continued therapy to improve his strength at discharge. He

## 2025-08-01 NOTE — DISCHARGE SUMMARY
Hospital Medicine Discharge Summary      Patient Identification:   Anirudh Segal   : 1980  MRN: [id number]   Account: [id number]      Patient's PCP: Brooke Sun MD    Admit Date: 2025     Discharge Date:   2025    Admitting Physician: Steph Hsieh PA-C     Discharge Physician: Michelle Parker, APRN - CNP     Discharge Diagnoses:    Active Hospital Problems    Diagnosis Date Noted    Hypokalemia [E87.6] 2025    Leg swelling [M79.89] 2025    Hyponatremia [E87.1] 2025    Alcohol abuse [F10.10] 2025    Leg pain, bilateral [M79.604, M79.605] 2025       The patient was seen and examined on day of discharge and this discharge summary is in conjunction with any daily progress note from day of discharge.    Hospital Course:   Anirudh Segal is a 44 y.o. male admitted to [facility] on 2025 for bilateral leg pain and swelling.        Discharge assessment and plan:  Electrolyte abnormalities - resolved: Hyponatremic, hypochloremic, hypokalemic on arrival with elevated anion gap.  Likely secondary to dehydration and ETOH abuse.  Treated with IV hydration of Lactated Ringers and electrolyte replacement protocols.  Potassium up to 3.5 today, chloride 96, sodium 139.  Gap closed at 10.  Repeat labs remain stable.     Sinus tachycardia - resolved: Consistent heart rate above 100.   Upon reviewing chart, he has been tachycardic continuously for over the last year, actually since 2017.  Echo obtained yesterday evening, indicates EF of 40-45%, grade 1 diastolic dysfunction.  Started on Toprol XL 50 mg daily and increased to 100 mg daily at discharge.    Bilateral leg pain and swelling: Dependent swelling has resolved, however neuropathic type pain persists.  Reports that the \"pins and needles\" feeling is to his feet bilaterally.  Admits to not taking Neurontin at home for \"awhile.  Gene hose in place.  Venous ultrasound negative for DVT. BNP wnl.  CONSULT TO ADDICTION SERVICES    Disposition:    [x] Home       [] TCU       [] Rehab       [] Psych       [] SNF       [] Long Term Care Facility       [] Other-    Condition at Discharge: Stable    Code Status:  Full Code     Patient Instructions:    Activity: activity as tolerated  Diet: ADULT DIET; Regular  ADULT ORAL NUTRITION SUPPLEMENT; Breakfast, Dinner, Lunch; Standard 4 oz Oral Supplement      Follow-up visits:   Brooke Sun MD  [address] [place] [address]  [phone]    Go on 8/7/2025  Hospital follow up appt @11am         Discharge Medications:        Medication List        START taking these medications      loperamide 2 MG capsule  Commonly known as: IMODIUM  Take 1 capsule by mouth 4 times daily as needed for Diarrhea     metoprolol succinate 50 MG extended release tablet  Commonly known as: TOPROL XL  Take 1 tablet by mouth daily  Start taking on: August 2, 2025     polycarbophil 625 MG tablet  Commonly known as: FIBERCON  Take 1 tablet by mouth daily  Start taking on: August 2, 2025            CONTINUE taking these medications      acetaminophen 325 MG tablet  Commonly known as: TYLENOL     amLODIPine 10 MG tablet  Commonly known as: NORVASC  1 tablet by Per G Tube route daily     baclofen 10 MG tablet  Commonly known as: LIORESAL  Take 1 tablet by mouth 3 times daily     cloNIDine 0.2 MG/24HR Ptwk  Commonly known as: CATAPRES  Place 1 patch onto the skin once a week     doxazosin 1 MG tablet  Commonly known as: CARDURA  1 tablet by Per G Tube route at bedtime     doxepin 10 MG capsule  Commonly known as: SINEQUAN  Take 1 capsule by mouth nightly as needed (sleep)     ferrous sulfate 325 (65 Fe) MG tablet  Commonly known as: IRON 325  1 tablet by Per G Tube route every other day     folic acid 1 MG tablet  Commonly known as: FOLVITE  1 tablet by Per NG tube route daily     gabapentin 300 MG capsule  Commonly known as: NEURONTIN     hydroCHLOROthiazide 25 MG tablet  Commonly

## 2025-08-01 NOTE — PLAN OF CARE
Problem: Discharge Planning  Goal: Discharge to home or other facility with appropriate resources  7/31/2025 2109 by Deirdre Dumont, RN  Outcome: Progressing  7/31/2025 1552 by Wendy Gonzalez RN  Outcome: Progressing  Flowsheets (Taken 7/31/2025 1552)  Discharge to home or other facility with appropriate resources:   Identify barriers to discharge with patient and caregiver   Arrange for needed discharge resources and transportation as appropriate   Identify discharge learning needs (meds, wound care, etc)  Note: Patient plans to discharge home when medically stable.      Problem: Seizure Precautions  Goal: Remains free of injury related to seizures activity  7/31/2025 2109 by Deirdre Dumont, RN  Outcome: Progressing  7/31/2025 1552 by Wendy Gonzalez RN  Outcome: Progressing  Flowsheets (Taken 7/31/2025 1552)  Remains free of injury related to seizure activity:   Maintain airway, patient safety  and administer oxygen as ordered   If seizure occurs, turn patient to side and suction secretions as needed   Monitor patient for seizure activity, document and report duration and description of seizure to Licensed Independent Practitioner  Note: Patient has history of seizures. Anti-seizure medications per MAR. No evidence of seizure activity at this time.      Problem: Pain  Goal: Verbalizes/displays adequate comfort level or baseline comfort level  7/31/2025 2109 by Deirdre Dumont, RN  Outcome: Progressing  7/31/2025 1552 by Wendy Gonzalez RN  Outcome: Progressing  Flowsheets (Taken 7/31/2025 1552)  Verbalizes/displays adequate comfort level or baseline comfort level:   Encourage patient to monitor pain and request assistance   Assess pain using appropriate pain scale   Administer analgesics based on type and severity of pain and evaluate response  Note: Patient complaining of pain this shift. Prn pain medication available and given. Will monitor.      Problem: Skin/Tissue Integrity  Goal: Skin integrity

## 2025-08-01 NOTE — PROGRESS NOTES
Discharge teaching and instructions for diagnosis/procedure of hypokalemia completed with patient using teachback method. AVS reviewed. Printed prescriptions given to patient. Patient voiced understanding regarding prescriptions, follow up appointments, and care of self at home. Discharged in a wheelchair to  home with support per friend.

## 2025-08-01 NOTE — CARE COORDINATION
8/1/25, 3:13 PM EDT    Home with adult son. New referral sent to [facility]  ([facility], patient's first choice, was unable to accept d/t insurance payor)  Patient goals/plan/ treatment preferences discussed by  and .  Patient goals/plan/ treatment preferences reviewed with patient/ family.  Patient/ family verbalize understanding of discharge plan and are in agreement with goal/plan/treatment preferences.  Understanding was demonstrated using the teach back method.  AVS provided by RN at time of discharge, which includes all necessary medical information pertaining to the patients current course of illness, treatment, post-discharge goals of care, and treatment preferences.     Services At/After Discharge: Home Health

## 2025-08-01 NOTE — PROGRESS NOTES
[facility]  [facility] RENAL TELEMETRY 6K  Occupational Therapy  Daily Note    Discharge Recommendations: Home with assist as needed and Home with Home Health OT  Equipment Recommendations: No        Time In: 1012  Time Out: 1035  Timed Code Treatment Minutes: 23 Minutes  Minutes: 23          Date: 2025  Patient Name: Anirudh Segal,   Gender: male      Room: [facility]  MRN: [id number]  : 1980  (44 y.o.)  Referring Practitioner: Michelle Parker APRN - CNP  Diagnosis: Hypokalemia  Additional Pertinent Hx: Per EMR: 44 y.o. 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  Patient states that this has been present for about 2 weeks, states he put on compression socks and his swelling resolved and pain improved.  Noted to return about a week ago.  Patient currently does not have an CAMILO hose.  Has pain to palpation to bilateral lower extremities.  Patient states pain goes up to his knees.  Patient reports drinking approximately 1 pint of liquor daily and marijuana use.  States he eats \"a lot of ice,\" otherwise states he eats and drinks okay, but not a lot.    Restrictions/Precautions:  Restrictions/Precautions: Fall Risk, General Precautions  Position Activity Restriction  Other Position/Activity Restrictions: chronic R ronal     Social/Functional History:  Lives With: Son  Type of Home: House  Home Layout: One level  Home Access: Stairs to enter without rails  Entrance Stairs - Number of Steps: 1  Home Equipment: Walker - Rolling, Wheelchair - Manual, Wheelchair - Electric, Cane   Bathroom Shower/Tub: Walk-in shower  Bathroom Toilet: Standard  Bathroom Equipment: Shower chair  IADL Comments: Family or friends bring over meals. Sister

## 2025-08-04 ENCOUNTER — TELEPHONE (OUTPATIENT)
Age: 45
End: 2025-08-04

## 2025-08-05 ENCOUNTER — OFFICE VISIT (OUTPATIENT)
Dept: FAMILY MEDICINE CLINIC | Age: 45
End: 2025-08-05
Payer: COMMERCIAL

## 2025-08-05 ENCOUNTER — TELEPHONE (OUTPATIENT)
Dept: FAMILY MEDICINE CLINIC | Age: 45
End: 2025-08-05

## 2025-08-05 VITALS
WEIGHT: 164 LBS | RESPIRATION RATE: 14 BRPM | DIASTOLIC BLOOD PRESSURE: 64 MMHG | HEIGHT: 66 IN | OXYGEN SATURATION: 98 % | SYSTOLIC BLOOD PRESSURE: 120 MMHG | HEART RATE: 125 BPM | BODY MASS INDEX: 26.36 KG/M2 | TEMPERATURE: 98.1 F

## 2025-08-05 DIAGNOSIS — I10 HYPERTENSION, ESSENTIAL: ICD-10-CM

## 2025-08-05 DIAGNOSIS — D53.9 MACROCYTIC ANEMIA: ICD-10-CM

## 2025-08-05 DIAGNOSIS — Z87.820 HX OF TRAUMATIC BRAIN INJURY: ICD-10-CM

## 2025-08-05 DIAGNOSIS — G81.91 RIGHT HEMIPARESIS (HCC): ICD-10-CM

## 2025-08-05 DIAGNOSIS — R00.0 CHRONIC TACHYCARDIA: ICD-10-CM

## 2025-08-05 DIAGNOSIS — R53.81 PHYSICAL DECONDITIONING: ICD-10-CM

## 2025-08-05 DIAGNOSIS — Z72.0 TOBACCO ABUSE: ICD-10-CM

## 2025-08-05 DIAGNOSIS — E03.9 HYPOTHYROIDISM, UNSPECIFIED TYPE: ICD-10-CM

## 2025-08-05 DIAGNOSIS — Z09 HOSPITAL DISCHARGE FOLLOW-UP: Primary | ICD-10-CM

## 2025-08-05 DIAGNOSIS — F12.10 MARIJUANA ABUSE: ICD-10-CM

## 2025-08-05 DIAGNOSIS — K76.0 HEPATIC STEATOSIS: ICD-10-CM

## 2025-08-05 DIAGNOSIS — R79.89 ELEVATED LFTS: ICD-10-CM

## 2025-08-05 DIAGNOSIS — F10.10 ALCOHOL ABUSE: ICD-10-CM

## 2025-08-05 PROBLEM — J96.01 ACUTE RESPIRATORY FAILURE WITH HYPOXIA (HCC): Status: RESOLVED | Noted: 2024-10-18 | Resolved: 2025-08-05

## 2025-08-05 PROBLEM — I60.9 SAH (SUBARACHNOID HEMORRHAGE) (HCC): Status: RESOLVED | Noted: 2017-05-11 | Resolved: 2025-08-05

## 2025-08-05 PROBLEM — S12.101A CLOSED NONDISPLACED FRACTURE OF SECOND CERVICAL VERTEBRA (HCC): Status: RESOLVED | Noted: 2017-05-06 | Resolved: 2025-08-05

## 2025-08-05 PROCEDURE — 1111F DSCHRG MED/CURRENT MED MERGE: CPT

## 2025-08-05 PROCEDURE — 99215 OFFICE O/P EST HI 40 MIN: CPT

## (undated) DEVICE — INTENDED FOR TISSUE SEPARATION, AND OTHER PROCEDURES THAT REQUIRE A SHARP SURGICAL BLADE TO PUNCTURE OR CUT.: Brand: BARD-PARKER ® CARBON RIB-BACK BLADES

## (undated) DEVICE — GASTROSTOMY TUBE WITH ENFIT CONNECTOR, 24FR: Brand: GASTROSTOMY TUBE WITH ENFIT CONNECTOR

## (undated) DEVICE — TROCAR: Brand: KII FIOS FIRST ENTRY

## (undated) DEVICE — PACK,LAPAROSCOPY,PK II,SIRUS: Brand: MEDLINE

## (undated) DEVICE — YANKAUER,POOLE TIP,STERILE,50/CS: Brand: MEDLINE

## (undated) DEVICE — SOLUTION SCRB 4OZ 7.5% POVIDONE IOD ANTIMIC BTL

## (undated) DEVICE — GENERAL LAPAROSCOPY-LF: Brand: MEDLINE INDUSTRIES, INC.

## (undated) DEVICE — Device: Brand: SENSURA MIO

## (undated) DEVICE — SUTURE VICRYL + SZ 0 L27IN ABSRB VLT L36MM CT-1 1/2 CIR VCPB260H

## (undated) DEVICE — SYRINGE CATH TIP 50ML

## (undated) DEVICE — EVACUATOR SURG 100CC SIL BLB SUCT RESVR FOR CLS WND DRNGE

## (undated) DEVICE — SUTURE PROL SZ 2-0 L36IN NONABSORBABLE BLU SH L26MM 1/2 CIR 8523H

## (undated) DEVICE — PENCIL SMK EVAC ALL IN 1 DSGN ENH VISIBILITY IMPROVED AIR

## (undated) DEVICE — INSUFFLATION NEEDLE TO ESTABLISH PNEUMOPERITONEUM.: Brand: INSUFFLATION NEEDLE

## (undated) DEVICE — CLEAN CLOSURE PACK: Brand: MEDLINE INDUSTRIES, INC.

## (undated) DEVICE — PADDING CAST W6INXL4YD COT LO LINTING WYTEX

## (undated) DEVICE — SET UP: Brand: MEDLINE INDUSTRIES, INC.

## (undated) DEVICE — SOLUTION PREP PAINT POV IOD FOR SKIN MUCOUS MEM

## (undated) DEVICE — TIP COVER ACCESSORY

## (undated) DEVICE — STAPLER 60MM POWERED ECHELON 3000  SHORT 340MM

## (undated) DEVICE — SUTURE VICRYL + SZ 2-0 L27IN ABSRB CLR CT-1 1/2 CIR TAPERCUT VCP259H

## (undated) DEVICE — SEALER ENDOSCP NANO COAT OPN DIV CRV L JAW LIGASURE IMPACT

## (undated) DEVICE — BASIC SINGLE BASIN BTC-LF: Brand: MEDLINE INDUSTRIES, INC.

## (undated) DEVICE — SUTURE VCRL + SZ 4-0 L27IN ABSRB WHT FS-2 3/8 CIR REV CUT VCP422H

## (undated) DEVICE — FAN SPRAY KIT: Brand: PULSAVAC®

## (undated) DEVICE — RELOAD STPL L60MM H1.5-3.6MM REG TISS BLU GRIPPING SURF B

## (undated) DEVICE — SUTURE 1 36 VIO MONO PDS PDP371T

## (undated) DEVICE — COLUMN DRAPE

## (undated) DEVICE — YANKAUER,BULB TIP,W/O VENT,RIGID,STERILE: Brand: MEDLINE

## (undated) DEVICE — TUBING, SUCTION, 1/4" X 20', STRAIGHT: Brand: MEDLINE INDUSTRIES, INC.

## (undated) DEVICE — SUTURE STRATAFIX SYMMETRIC SZ 1 L18IN ABSRB VLT CT1 L36CM SXPP1A404

## (undated) DEVICE — SPONGE GZ W4XL4IN COT 12 PLY TYP VII WVN C FLD DSGN STERILE

## (undated) DEVICE — CLEANSER WND CLN DEB SYS IRRISEPT

## (undated) DEVICE — GLOVE ORANGE PI 7   MSG9070

## (undated) DEVICE — SOLUTION IRRIG 1000ML 0.9% SOD CHL USP POUR PLAS BTL

## (undated) DEVICE — SUTURE VICRYL + SZ 0 L18IN ABSRB TIE VCP106G

## (undated) DEVICE — ARM DRAPE

## (undated) DEVICE — SUTURE VICRYL + SZ 2-0 L27IN ABSRB WHT SH 1/2 CIR TAPERCUT VCP417H

## (undated) DEVICE — PUMP SUC IRR TBNG L10FT W/ HNDPC ASSEMB STRYKEFLOW 2

## (undated) DEVICE — DRAIN SURG 19FR 0.25IN SIL RND W/ TRCR INDIC DOT RADPQ FULL

## (undated) DEVICE — TUBING INSUFFLATOR HEAT HUMIDIFIED SMK EVAC SET PNEUMOCLEAR

## (undated) DEVICE — SUTURE PERMAHAND SZ 3-0 L18IN NONABSORBABLE BLK L26MM SH C013D

## (undated) DEVICE — BLADE ES L6IN ELASTOMERIC COAT EXT DURABLE BEND UPTO 90DEG

## (undated) DEVICE — SYRINGE IRRIG 60ML SFT PLIABLE BLB EZ TO GRP 1 HND USE W/

## (undated) DEVICE — 450 ML BOTTLE OF 0.05% CHLORHEXIDINE GLUCONATE IN 99.95% STERILE WATER FOR IRRIGATION, USP AND APPLICATOR.: Brand: IRRISEPT ANTIMICROBIAL WOUND LAVAGE

## (undated) DEVICE — PREMIUM DRY TRAY LF: Brand: MEDLINE INDUSTRIES, INC.

## (undated) DEVICE — LIQUIBAND RAPID ADHESIVE 36/CS 0.8ML: Brand: MEDLINE

## (undated) DEVICE — ELECTRO LUBE IS A SINGLE PATIENT USE DEVICE THAT IS INTENDED TO BE USED ON ELECTROSURGICAL ELECTRODES TO REDUCE STICKING.: Brand: KEY SURGICAL ELECTRO LUBE

## (undated) DEVICE — SUTURE MONOCRYL + ABSORBABLE MONOFILAMENT 3-0 PS-2 27 IN UD SXMP1B109

## (undated) DEVICE — SUTURE VCRL + SZ 0 L18IN ABSRB TIE VCP106G

## (undated) DEVICE — SUTURE ETHILON SZ 3-0 L18IN NONABSORBABLE BLK L24MM FS-1 3/8 663G

## (undated) DEVICE — BLADELESS OBTURATOR: Brand: WECK VISTA

## (undated) DEVICE — STAPLER INT DIA29MM CLS STPL H1.5-2.2MM OPN LEG L5.2MM 26

## (undated) DEVICE — SUTURE VICRYL PLUS TAPERPOINT ANTIBAC BRD SH NDL 3-0

## (undated) DEVICE — SUTURE VICRYL + SZ 3-0 L27IN ABSRB UD L26MM SH 1/2 CIR VCP416H

## (undated) DEVICE — SEAL

## (undated) DEVICE — RELOAD STPL H4.1X2MM DIA60MM THCK TISS GRN 6 ROW PWR GST B

## (undated) DEVICE — DRAPE,UNDERBUTTOCKS,PCH,STERILE: Brand: MEDLINE

## (undated) DEVICE — HYPODERMIC SAFETY NEEDLE: Brand: MAGELLAN

## (undated) DEVICE — VESSEL SEALER EXTEND: Brand: ENDOWRIST

## (undated) DEVICE — APPLIER LIG CLP M L11IN TI STR RNG HNDL FOR 20 CLP DISP

## (undated) DEVICE — BREAST HERNIA: Brand: MEDLINE INDUSTRIES, INC.

## (undated) DEVICE — SUTURE VCRL + SZ 3-0 L27IN ABSRB UD L26MM SH 1/2 CIR VCP416H

## (undated) DEVICE — GOWN,SIRUS,NON REINFRCD,LARGE,SET IN SL: Brand: MEDLINE

## (undated) DEVICE — BLADE,CARBON-STEEL,10,STRL,DISPOSABLE,TB: Brand: MEDLINE

## (undated) DEVICE — SPONGE LAP W18XL18IN WHT COT 4 PLY FLD STRUNG RADPQ DISP ST 2 PER PACK

## (undated) DEVICE — SUTURE V-LOC 180 SZ 2-0 L12IN ABSRB VLT GS-21 L37MM 1/2 CIR VLOCM0315